# Patient Record
Sex: FEMALE | Race: WHITE | Employment: UNEMPLOYED | URBAN - METROPOLITAN AREA
[De-identification: names, ages, dates, MRNs, and addresses within clinical notes are randomized per-mention and may not be internally consistent; named-entity substitution may affect disease eponyms.]

---

## 2020-05-04 ENCOUNTER — OFFICE VISIT (OUTPATIENT)
Dept: GERIATRIC MEDICINE | Age: 80
End: 2020-05-04
Payer: MEDICARE

## 2020-06-18 VITALS — HEART RATE: 71 BPM | RESPIRATION RATE: 18 BRPM | SYSTOLIC BLOOD PRESSURE: 127 MMHG | DIASTOLIC BLOOD PRESSURE: 70 MMHG

## 2020-06-18 NOTE — PROGRESS NOTES
PATIENTGegeno Vera : 1940 DOS: 2020     [facility]    CHIEF COMPLAINT: Wound on the left foot. SUBJECTIVE: The patient was seen and examined in her room today with respect to the wound on her left foot. She reports she deliberately put on two pairs of sock to mitigate pain in her left foot, and one of her Caregivers reports that she has a concern, but for now, there has been no associated fever, chills, rigors or any discharge from the lesion on her left foot observed. OBJECTIVE: Her vital signs include blood pressure 127/70 mmHg, heart rate is 71 beats per minute and respiratory rate is 18 per minute. The patient is a thin, but well-appearing, elderly,  female, who is alert and not in any obvious distress. Lungs are clear to auscultations, bilaterally, and she has audible heart tones. There is no pedal edema and the skin is warm and dry except for the left foot that is still erythematous, and has actually exhibited significant improvement. She also displays significant discoloration of the toenail with thick distal subungual debris. ASSESSMENT AND PLAN:   1. Cellulitis, left foot: Significantly improved as we continue to observe it, and we will follow up within the next few days to 1 week.   2. Onychomycosis of toenail: consider topical antifungal while waiting for her to be seen by a Lola Maher MD  Attending Family Physician & Geriatrician        Electronically Signed By: Jennifer Obrien MD on 2020 11:19:29  ______________________________  Jennifer Obrien MD  /[id number]  D: 2020 00:36:41  T: 2020 04:45:11    cc: - Gregg Lorenzo of [facility]

## 2020-08-31 ENCOUNTER — OFFICE VISIT (OUTPATIENT)
Dept: GERIATRIC MEDICINE | Age: 80
End: 2020-08-31
Payer: MEDICARE

## 2020-10-24 VITALS — HEART RATE: 71 BPM | RESPIRATION RATE: 18 BRPM | DIASTOLIC BLOOD PRESSURE: 63 MMHG | SYSTOLIC BLOOD PRESSURE: 123 MMHG

## 2020-10-25 NOTE — PROGRESS NOTES
PATIENTMagdlizzette Lozoya : 1940 DOS: 2020     [facility]      CHIEF COMPLAINT:  Skin rash on the face. SUBJECTIVE:  I was asked to see this patient, said to have suddenly developed what looked like a skin rash on the face and by evening, it seems to have improved, but the following day it came back again. Her Caregivers have not observed any associated fever or significant changes in the patient who continues to go about her usual routine on the unit. She ambulates without the use of any assistive device. MEDICATIONS:  She is currently acidophilus caplet 1 twice daily, Aricept 5 mg once daily, Seroquel 12.5 mg once daily, ketoconazole 2% cream to be applied to the left foot twice daily, nystatin powder to be applied around the breasts and the groin until the skin rash resolves, acetaminophen 325 mg and she is to take two tablets q.4 hours p.r.n. as needed for fever or pain, Mintox suspension 30 mL q.4 hours p.r.n., and milk of magnesia 30 mL once daily as needed. OBJECTIVE:  Her vital signs include /63, HR 71, and RR 18. The patient is a well nourished and well developed, elderly,  female, who is alert and not pale or febrile to the touch. Head is normocephalic and atraumatic with pupils equal, round, and reactive to light. Lungs are clear to auscultations, bilaterally, and she exhibits audible heart tones. Skin is warm and dry except for an irregular area of deep erythema on the face. ASSESSMENT AND PLAN:  Facial flushing:   For now, we will continue to monitor and observe the patient while reassuring her relatives but if it continues, we will take care of it.        Kedar Lee MD  Attending Geriatrician        Electronically Signed By: Manjit Real MD on 10/22/2020 11:32:30  ______________________________  Manjit Real MD  /[id number]  D: 10/17/2020 23:08:20  T: 10/18/2020 05:57:46    cc: - [facility]

## 2021-01-13 LAB
ALBUMIN SERPL-MCNC: 4.3 G/DL
ALP BLD-CCNC: 74 U/L
ALT SERPL-CCNC: 19 U/L
ANION GAP SERPL CALCULATED.3IONS-SCNC: 15 MMOL/L
AST SERPL-CCNC: NORMAL U/L
BASOPHILS ABSOLUTE: 0.02 /ΜL
BASOPHILS RELATIVE PERCENT: 0.4 %
BILIRUB SERPL-MCNC: 1 MG/DL (ref 0.1–1.4)
BUN BLDV-MCNC: NORMAL MG/DL
CALCIUM SERPL-MCNC: 9.8 MG/DL
CHLORIDE BLD-SCNC: 103 MMOL/L
CO2: 22 MMOL/L
CREAT SERPL-MCNC: 0.8 MG/DL
EOSINOPHILS ABSOLUTE: 0.02 /ΜL
EOSINOPHILS RELATIVE PERCENT: 0 %
GFR CALCULATED: NORMAL
GLUCOSE BLD-MCNC: NORMAL MG/DL
HCT VFR BLD CALC: 42.6 % (ref 36–46)
HEMOGLOBIN: 13.6 G/DL (ref 12–16)
LYMPHOCYTES ABSOLUTE: 1.57 /ΜL
LYMPHOCYTES RELATIVE PERCENT: 34.4 %
MCH RBC QN AUTO: 273.6 PG
MCHC RBC AUTO-ENTMCNC: 31 G/DL
MCV RBC AUTO: NORMAL FL
MONOCYTES ABSOLUTE: NORMAL
MONOCYTES RELATIVE PERCENT: 14.3 %
NEUTROPHILS ABSOLUTE: 2.3 /ΜL
NEUTROPHILS RELATIVE PERCENT: 50.8 %
PLATELET # BLD: 221 K/ΜL
PMV BLD AUTO: 11.9 FL
POTASSIUM SERPL-SCNC: 4.3 MMOL/L
RBC # BLD: 4.02 10^6/ΜL
SODIUM BLD-SCNC: 140 MMOL/L
TOTAL PROTEIN: 7.2
TROPONIN: <0.1
WBC # BLD: 4.56 10^3/ML

## 2021-02-19 ENCOUNTER — OFFICE VISIT (OUTPATIENT)
Dept: GERIATRIC MEDICINE | Age: 81
End: 2021-02-19
Payer: MEDICARE

## 2021-02-19 VITALS — RESPIRATION RATE: 16 BRPM | SYSTOLIC BLOOD PRESSURE: 147 MMHG | DIASTOLIC BLOOD PRESSURE: 100 MMHG | HEART RATE: 86 BPM

## 2021-02-19 DIAGNOSIS — K13.79 ORAL PAIN: ICD-10-CM

## 2021-02-19 DIAGNOSIS — F03.91 DEMENTIA WITH BEHAVIORAL DISTURBANCE, UNSPECIFIED DEMENTIA TYPE: Primary | ICD-10-CM

## 2021-02-19 DIAGNOSIS — R03.0 BLOOD PRESSURE ELEVATED WITHOUT HISTORY OF HTN: ICD-10-CM

## 2021-02-19 DIAGNOSIS — Z86.16 HISTORY OF 2019 NOVEL CORONAVIRUS DISEASE (COVID-19): ICD-10-CM

## 2021-02-19 RX ORDER — QUETIAPINE FUMARATE 25 MG/1
TABLET, FILM COATED ORAL
COMMUNITY
Start: 2021-02-12 | End: 2021-02-19 | Stop reason: SDUPTHER

## 2021-02-19 RX ORDER — FAMOTIDINE 20 MG/1
TABLET, FILM COATED ORAL
Status: ON HOLD | COMMUNITY
Start: 2021-01-04 | End: 2022-01-21

## 2021-02-19 RX ORDER — QUETIAPINE FUMARATE 25 MG/1
12.5 TABLET, FILM COATED ORAL DAILY
Qty: 30 TABLET | Refills: 0
Start: 2021-02-19

## 2021-02-19 RX ORDER — DONEPEZIL HYDROCHLORIDE 5 MG/1
5 TABLET, FILM COATED ORAL DAILY
COMMUNITY
Start: 2021-02-12

## 2021-02-19 ASSESSMENT — ENCOUNTER SYMPTOMS
TROUBLE SWALLOWING: 0
SINUS PAIN: 0
SORE THROAT: 0

## 2021-02-19 NOTE — PROGRESS NOTES
Romina Lundberg is a 80 y.o. female with cerebrovascular disease with dementia, recent history of coronavirus disease, whom I am seeing at [facility] assisted living [facility] memory unit for annual medical evaluation. The patient was seen with his/her consent in his/her room at the facility. I also spoke with the nurse and reviewed the available records. Chief Complaint   Patient presents with    Dementia    H&P    Blood Pressure Check    Oral Pain       Interim history: Since last seen by the PCP in August 2020, the patient has done well, no emergency room visits. Recovered from recent coronavirus 19 infection with no need for hospitalization, was treated with outpatient treatment protocol. No significant concerns from the nursing staff.         Laboratory and imaging studies reports reviewed included (but were not limited to) the following:    Orders Only on 01/14/2021   Component Date Value Ref Range Status    Troponin 01/13/2021 <0.10   Final    WBC 01/13/2021 4.56  10^3/mL Final    Hemoglobin 01/13/2021 13.6  12.0 - 16.0 g/dL Final    Hematocrit 01/13/2021 42.6  36 - 46 % Final    Platelets 95/74/2471 221  K/µL Final    Neutrophils % 01/13/2021 50.8  % Final    Lymphocytes % 01/13/2021 34.4  % Final    Monocytes % 01/13/2021 14.3  % Final    Eosinophils % 01/13/2021 0.0  % Final    Basophils % 01/13/2021 0.4  % Final    Neutrophils Absolute 01/13/2021 2.30  /µL Final    Lymphocytes Absolute 01/13/2021 1.57  /µL Final    Eosinophils Absolute 01/13/2021 0.02  /µL Final    <0.03    Basophils Absolute 01/13/2021 0.02  /µL Final    <0.03    RBC 01/13/2021 4.02  10^6/µL Final    MCH 01/13/2021 273.6  pg Final    MCHC 01/13/2021 31.0  g/dL Final    MPV 01/13/2021 11.9  fL Final   Orders Only on 01/14/2021   Component Date Value Ref Range Status    Sodium 01/13/2021 140  mmol/L Final    Chloride 01/13/2021 103  mmol/L Final    Potassium 01/13/2021 4.3  mmol/L Final    CREATININE 01/13/2021 0.80   Final    ALT 01/13/2021 19  U/L Final    Calcium 01/13/2021 9.8  mg/dL Final    Total Protein 01/13/2021 7.2   Final    CO2 01/13/2021 22  mmol/L Final    Albumin 01/13/2021 4.3   Final    Alkaline Phosphatase 01/13/2021 74  U/L Final    Total Bilirubin 01/13/2021 1.0  0.1 - 1.4 mg/dL Final    Anion Gap 01/13/2021 15  mmol/L Final       HPI:      Dementia  Primary caregiver is patient and nursing attendant. The nursing staff identifies problems with changes in short and long term memory, getting disoriented outside of familiar environment and wandering. Nursing staff are concerned about  wandering and inability to maintain adequate nutrition. Medication administration: caregiver monitors the use of medications      Functional Assessment:   Activities of Daily Living (ADLs):    She is independent in the following: ambulation and feeding  Requires assistance with the following: bathing and hygiene, grooming and dressing  Instrumental Activities of Daily Living (IADLs):    Requires assistance with the following: all, per the medical evaluation form details. More detail above in the chiefcomplaint(s), interim history and below in the review of systems. Oral Pain   This is a new problem. The pain is mild. Pertinent negatives include no difficulty swallowing, facial pain or oral bleeding. She has tried nothing for the symptoms. Past Medical History:   Diagnosis Date    Asymptomatic varicose veins of both lower extremities     Dementia with [facility] disturbance [facility] [address]     Full dentures     History of hip fracture 2012    Small vessel disease, cerebrovascular        Past Surgical History:   Procedure Laterality Date    HIP FRACTURE SURGERY  2012       Social History     Socioeconomic History    Marital status:       Spouse name: Not on file    Number of children: Not on file    Years of education: Not on file    Highest education level: Not on file   Occupational History    02/19/21 1809 02/19/21 1811   BP: (!) 171/100 (!) 147/100   Site: Right Wrist Left Wrist   Position: Sitting Sitting   Cuff Size: Medium Adult    Pulse: 88 86   Resp: 16        Physical Exam  Constitutional:       General: She is not in acute distress. Appearance: She is normal weight. She is not ill-appearing. Comments: Thin built, age-appropriate, confused but very pleasant, slightly anxious   HENT:      Head: Atraumatic. Nose: No rhinorrhea. Mouth/Throat:      Mouth: Mucous membranes are moist.      Dentition: Has dentures. No gingival swelling or gum lesions. Tongue: Tongue does not deviate from midline. Comments: The patient was asked to remove her upper dentures. This revealed the 3 x 3 mm round nodule on the roof of the mouth close to the gingival line, no ulcerations, pink in color  Eyes:      General: No scleral icterus. Extraocular Movements: Extraocular movements intact. Conjunctiva/sclera: Conjunctivae normal.   Neck:      Musculoskeletal: No neck rigidity. Cardiovascular:      Rate and Rhythm: Regular rhythm. Tachycardia present. Pulses: Normal pulses. Heart sounds: No gallop. Pulmonary:      Effort: Pulmonary effort is normal.      Breath sounds: Normal breath sounds. No wheezing or rales. Abdominal:      General: Abdomen is flat. There is no distension. Palpations: Abdomen is soft. There is no mass. Tenderness: There is no abdominal tenderness. There is no guarding. Musculoskeletal:         General: No tenderness or deformity. Lymphadenopathy:      Cervical: No cervical adenopathy. Skin:     General: Skin is warm and dry. Coloration: Skin is not jaundiced or pale. Findings: No rash. Neurological:      General: No focal deficit present. Mental Status: She is alert. Mental status is at baseline. She is disoriented. Motor: No weakness.       Coordination: Coordination normal.      Gait: Gait normal. Psychiatric:         Attention and Perception: She is attentive. Mood and Affect: Mood is not anxious or depressed. Affect is not labile. Speech: Speech normal.         Behavior: Behavior is not slowed or withdrawn. Behavior is cooperative. Thought Content: Thought content is not paranoid. Cognition and Memory: Memory is impaired. Assessment:    [facility] was seen today for dementia, h&p, blood pressure check and oral pain. Diagnoses and all orders for this visit:    Dementia with behavioral disturbance, unspecified dementia type ([address]               Stable on the current medication including low-dose donepezil. Continue supportive care, activities in the memory unit at [facility]. Annual medical evaluation form completed. Blood pressure elevated without history of HTN                In the setting of slight anxiety due to oral discomfort caused by upper dentures. Start advised to check blood pressure daily every shift x3 days, report readings to the office. Most likely patient is developing hypertensive disease and will need medication such as ACE inhibitor. Oral pain              Start daily mouthwash and use Orajel as needed, reevaluate      History of 2019 novel coronavirus disease (COVID-19)               Recovered from acute phase of the disease, complete outpatient treatment for a duration of 6 weeks. Other orders  -     QUEtiapine (SEROQUEL) 25 MG tablet; Take 0.5 tablets by mouth daily        Plan:    See all orders and comments in the assessment section. The current medical regimen is effective;  continue present plan and medications. Reviewed with the patient/nurse today's diagnosis and associated problems, treatment plans, prognosis, questions answered. Orders for annual laboratories placed in the chart.        Close follow up needed in one month with CNP or with MD.       I have reviewed the patient's medical and surgical, family and social history, health maintenance schedule, and updated the computerized patient record. Please note this report has been partially produced by using speech recognition hardware. It may contain errors related to the system, including grammar, punctuation and spelling as well as words and phrases that may seem inaccurate. For anyquestions or concerns, please feel free to contact me for clarification.         Electronically signed by Edward Mcmahon MD

## 2021-07-07 ENCOUNTER — OFFICE VISIT (OUTPATIENT)
Dept: GERIATRIC MEDICINE | Age: 81
End: 2021-07-07
Payer: MEDICARE

## 2021-07-07 DIAGNOSIS — K59.00 CONSTIPATION, UNSPECIFIED CONSTIPATION TYPE: ICD-10-CM

## 2021-07-07 DIAGNOSIS — F03.90 DEMENTIA WITHOUT BEHAVIORAL DISTURBANCE, UNSPECIFIED DEMENTIA TYPE: Primary | ICD-10-CM

## 2021-07-07 DIAGNOSIS — M15.9 OSTEOARTHRITIS OF MULTIPLE JOINTS, UNSPECIFIED OSTEOARTHRITIS TYPE: ICD-10-CM

## 2021-07-23 NOTE — PROGRESS NOTES
[facility]    Seen for a followup visit for her dementia with agitation, prior CVA, hypertension, degenerative joint disease. Medications reviewed. SUBJECTIVE:  This patient was evaluated in her room. She is at her baseline. Has been on atypical antipsychotic. No evidence of acute psychosis. No recent lightheadedness, or change in her bowel or bladder habits. No recent falls. Nursing staff report patient is clinically stable without evidence of acute pain crisis. No recent chest pain, palpitations. Is redirectable at home. Is more subdued by staff. No evidence of upper respiratory infection. REVIEW OF SYSTEMS:  Cannot provide coherent narration secondary to dementia. OBJECTIVE:  She appeared chronically ill. Pupils are small, but they are reactive. Oral mucosa is moist.  Chest showed no crackles, no wheezing. Cardiovascular exam showed a regular rate. ABDOMEN:  Soft, nontender. Extremities showed trace dorsal pedal pulse. ASSESSMENT AND PLAN:  1. Dementia. Patient is on quetiapine, to wean as able. No evidence of any new psychosis. Is on memantine. 2.   Degenerative joint disease. Continue antiinflammatories. 3.   Chronic constipation with impaction. Monitor clinically.         Electronically Signed By: Joyce Sorensen M.D. on 07/12/2021 01:15:57  ______________________________  Joyce Sorensen M.D.  AD/[id number]  D: 07/09/2021 17:13:22  T: 07/09/2021 18:05:51    [facility]

## 2021-07-28 ENCOUNTER — OFFICE VISIT (OUTPATIENT)
Dept: GERIATRIC MEDICINE | Age: 81
End: 2021-07-28
Payer: MEDICARE

## 2021-07-28 DIAGNOSIS — K59.00 CONSTIPATION, UNSPECIFIED CONSTIPATION TYPE: ICD-10-CM

## 2021-07-28 DIAGNOSIS — F03.90 DEMENTIA WITHOUT BEHAVIORAL DISTURBANCE, UNSPECIFIED DEMENTIA TYPE: Primary | ICD-10-CM

## 2021-07-28 DIAGNOSIS — I10 ESSENTIAL HYPERTENSION: ICD-10-CM

## 2021-08-24 ENCOUNTER — HOSPITAL ENCOUNTER (EMERGENCY)
Age: 81
Discharge: HOME | End: 2021-08-24
Payer: MEDICARE

## 2021-08-24 VITALS
RESPIRATION RATE: 17 BRPM | HEART RATE: 88 BPM | OXYGEN SATURATION: 100 % | BODY MASS INDEX: 23.32 KG/M2 | WEIGHT: 140 LBS | SYSTOLIC BLOOD PRESSURE: 149 MMHG | DIASTOLIC BLOOD PRESSURE: 66 MMHG | HEIGHT: 65 IN | TEMPERATURE: 98.2 F

## 2021-08-24 DIAGNOSIS — F03.91 DEMENTIA WITH BEHAVIORAL DISTURBANCE, UNSPECIFIED DEMENTIA TYPE: Primary | ICD-10-CM

## 2021-08-24 LAB
ALBUMIN SERPL-MCNC: 4.4 G/DL (ref 3.5–4.6)
ALP BLD-CCNC: 69 U/L (ref 40–130)
ALT SERPL-CCNC: 15 U/L (ref 0–33)
ANION GAP SERPL CALCULATED.3IONS-SCNC: 10 MEQ/L (ref 9–15)
ANISOCYTOSIS: ABNORMAL
AST SERPL-CCNC: 25 U/L (ref 0–35)
BACTERIA: ABNORMAL /HPF
BASOPHILS ABSOLUTE: 0 K/UL (ref 0–0.2)
BASOPHILS RELATIVE PERCENT: 0.9 %
BILIRUB SERPL-MCNC: 0.8 MG/DL (ref 0.2–0.7)
BILIRUBIN URINE: NEGATIVE
BLOOD, URINE: NEGATIVE
BUN BLDV-MCNC: 13 MG/DL (ref 8–23)
CALCIUM SERPL-MCNC: 9.5 MG/DL (ref 8.5–9.9)
CHLORIDE BLD-SCNC: 104 MEQ/L (ref 95–107)
CLARITY: CLEAR
CO2: 29 MEQ/L (ref 20–31)
COLOR: YELLOW
CREAT SERPL-MCNC: 0.86 MG/DL (ref 0.5–0.9)
EOSINOPHILS ABSOLUTE: 0.1 K/UL (ref 0–0.7)
EOSINOPHILS RELATIVE PERCENT: 2 %
EPITHELIAL CELLS, UA: ABNORMAL /HPF
GFR AFRICAN AMERICAN: >60
GFR NON-AFRICAN AMERICAN: >60
GLOBULIN: 2.7 G/DL (ref 2.3–3.5)
GLUCOSE BLD-MCNC: 92 MG/DL (ref 70–99)
GLUCOSE URINE: NEGATIVE MG/DL
HCT VFR BLD CALC: 43.3 % (ref 37–47)
HEMOGLOBIN: 14.2 G/DL (ref 12–16)
KETONES, URINE: NEGATIVE MG/DL
LEUKOCYTE ESTERASE, URINE: ABNORMAL
LYMPHOCYTES ABSOLUTE: 1.5 K/UL (ref 1–4.8)
LYMPHOCYTES RELATIVE PERCENT: 26 %
MCH RBC QN AUTO: 28.3 PG (ref 27–31.3)
MCHC RBC AUTO-ENTMCNC: 32.8 % (ref 33–37)
MCV RBC AUTO: 86.2 FL (ref 82–100)
MONOCYTES ABSOLUTE: 0.8 K/UL (ref 0.2–0.8)
MONOCYTES RELATIVE PERCENT: 14 %
NEUTROPHILS ABSOLUTE: 3.4 K/UL (ref 1.4–6.5)
NEUTROPHILS RELATIVE PERCENT: 58 %
NITRITE, URINE: NEGATIVE
PDW BLD-RTO: 16.3 % (ref 11.5–14.5)
PH UA: 6 (ref 5–9)
PLATELET # BLD: 192 K/UL (ref 130–400)
PLATELET SLIDE REVIEW: ADEQUATE
POTASSIUM SERPL-SCNC: 4 MEQ/L (ref 3.4–4.9)
PROTEIN UA: NEGATIVE MG/DL
RBC # BLD: 5.02 M/UL (ref 4.2–5.4)
RBC UA: ABNORMAL /HPF (ref 0–2)
SODIUM BLD-SCNC: 143 MEQ/L (ref 135–144)
SPECIFIC GRAVITY UA: 1.01 (ref 1–1.03)
TOTAL PROTEIN: 7.1 G/DL (ref 6.3–8)
URINE REFLEX TO CULTURE: ABNORMAL
UROBILINOGEN, URINE: 0.2 E.U./DL
WBC # BLD: 5.8 K/UL (ref 4.8–10.8)
WBC UA: ABNORMAL /HPF (ref 0–5)

## 2021-08-24 PROCEDURE — 36415 COLL VENOUS BLD VENIPUNCTURE: CPT

## 2021-08-24 PROCEDURE — 81001 URINALYSIS AUTO W/SCOPE: CPT

## 2021-08-24 PROCEDURE — 85025 COMPLETE CBC W/AUTO DIFF WBC: CPT

## 2021-08-24 PROCEDURE — 99282 EMERGENCY DEPT VISIT SF MDM: CPT

## 2021-08-24 PROCEDURE — 80053 COMPREHEN METABOLIC PANEL: CPT

## 2021-08-24 RX ORDER — MELATONIN
1000 DAILY
COMMUNITY

## 2021-08-24 RX ORDER — KETOCONAZOLE 20 MG/G
CREAM TOPICAL 2 TIMES DAILY
COMMUNITY

## 2021-08-24 RX ORDER — SELENIUM 50 MCG
1 TABLET ORAL 2 TIMES DAILY
COMMUNITY

## 2021-08-24 NOTE — ED NOTES
This nurse called [facility] and give report to nurse. They are aware pt being sent back with no ABN labs.  Awaiting transport with [facility] back to [address], RN  08/24/21 5633

## 2021-08-24 NOTE — ED PROVIDER NOTES
[address] [facility] ED  EMERGENCY DEPARTMENT ENCOUNTER      Pt Name: Kendy Clemente  MRN: [id number]  [place] 1940  Date of evaluation: 8/24/2021  Provider: Santos Perez PA-C      HISTORY OF PRESENT ILLNESS    Kendy Clemente is a 80 y.o. female who presents to the Emergency Department with medical evaluation. Patient was at [facility] she does have history of dementia and she got into an argument with the patient she changed no injuries she not hit her head she has no complaints and would like to go back to [facility]. REVIEW OF SYSTEMS       Review of Systems   Unable to perform ROS: Dementia         PAST MEDICAL HISTORY     Past Medical History:   Diagnosis Date    Asymptomatic varicose veins of both lower extremities     Dementia with behavioral disturbance (HCC)     Elevated blood pressure reading     Full dentures     History of hip fracture 2012    Small vessel disease, cerebrovascular          SURGICAL HISTORY       Past Surgical History:   Procedure Laterality Date    HIP FRACTURE SURGERY  2012         CURRENT MEDICATIONS       Previous Medications    DONEPEZIL (ARICEPT) 5 MG TABLET    Take 5 mg by mouth daily     FAMOTIDINE (PEPCID) 20 MG TABLET        KETOCONAZOLE (NIZORAL) 2 % CREAM    Apply topically daily Apply topically daily. LACTOBACILLUS (ACIDOPHILUS) CAPS CAPSULE    Take 1 capsule by mouth 2 times daily    QUETIAPINE (SEROQUEL) 25 MG TABLET    Take 0.5 tablets by mouth daily    VITAMIN D3 (CHOLECALCIFEROL) 25 MCG (1000 UT) TABS TABLET    Take 1,000 Units by mouth daily       ALLERGIES     Bactrim [sulfamethoxazole-trimethoprim], Bee venom, Ceftin [cefuroxime], and Wasp venom    FAMILY HISTORY       Family History   Family history unknown: Yes          SOCIAL HISTORY       Social History     Socioeconomic History    Marital status:       Spouse name: Not on file    Number of children: Not on file    Years of education: Not on file    Highest education level: Not on file   Occupational History    Not on file   Tobacco Use    Smoking status: Unknown If Ever Smoked    Smokeless tobacco: Never Used   Substance and Sexual Activity    Alcohol use: Not on file    Drug use: Not on file    Sexual activity: Not on file   Other Topics Concern    Not on file   Social History Narrative    No children    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    Resides at [place] [address] Strain:     Difficulty of Paying Living Expenses:    Food Insecurity:     Worried About [address] in the Last Year:    [address] in the Last Year:    Transportation Needs:     Lack of Transportation (Medical):  Lack of Transportation (Non-Medical):    Physical Activity:     Days of Exercise per Week:     Minutes of Exercise per Session:    Stress:     Feeling of Stress :    Social Connections:     Frequency of Communication with Friends and Family:     Frequency of Social Gatherings with Friends and Family:     Attends Zoroastrianism Services:     Active Member of Clubs or Organizations:     Attends Club or Organization Meetings:     Marital Status:    Intimate Partner Violence:     Fear of Current or Ex-Partner:     Emotionally Abused:     Physically Abused:     Sexually Abused:        SCREENINGS             PHYSICAL EXAM    (up to 7 for level 4, 8 or more for level 5)     ED Triage Vitals [08/24/21 0846]   BP Temp Temp Source Pulse Resp SpO2 Height Weight   (!) 149/66 98.2 °F (36.8 °C) Temporal 88 17 100 % 5' 5\" (1.651 m) 140 lb (63.5 kg)       Physical Exam  Vitals and nursing note reviewed. Constitutional:       General: She is not in acute distress. Appearance: Normal appearance. She is well-developed. She is not diaphoretic. HENT:      Head: Normocephalic and atraumatic.    Eyes:      General: Lids are normal.      Conjunctiva/sclera: Conjunctivae normal.   Cardiovascular:      Rate and Rhythm: Normal rate and regular rhythm. Pulses: Normal pulses. Heart sounds: Normal heart sounds. Pulmonary:      Effort: Pulmonary effort is normal.      Breath sounds: Normal breath sounds. Abdominal:      General: Bowel sounds are normal.      Palpations: Abdomen is soft. Tenderness: There is no abdominal tenderness. Musculoskeletal:      Cervical back: Normal range of motion and neck supple. Lymphadenopathy:      Cervical: No cervical adenopathy. Skin:     General: Skin is warm and dry. Capillary Refill: Capillary refill takes less than 2 seconds. Findings: No rash. Neurological:      Mental Status: She is alert and oriented to person, place, and time. Psychiatric:         Thought Content: Thought content normal.         Judgment: Judgment normal.           All other labs were within normal range or not returned as of this dictation. EMERGENCY DEPARTMENT COURSE and DIFFERENTIALDIAGNOSIS/MDM:   Vitals:    Vitals:    08/24/21 0846   BP: (!) 149/66   Pulse: 88   Resp: 17   Temp: 98.2 °F (36.8 °C)   TempSrc: Temporal   SpO2: 100%   Weight: 140 lb (63.5 kg)   Height: 5' 5\" (1.651 m)            Patient is nontoxic no acute distress she is afebrile hemodynamically stable she denies any injuries and reports they sent her here. CBC CMP and urine were performed on the patient as screening and show no acute abnormalities. Patient has no complaints she has no visible signs of injury or trauma so she will be sent back to the nursing home. PROCEDURES:  Unless otherwise noted below, none     Procedures      FINAL IMPRESSION      1.  Dementia with behavioral disturbance, unspecified dementia type [facility])          DISPOSITION/PLAN   DISPOSITION Decision To Discharge 08/24/2021 10:38:06 AM          Shauna Ratliff PA-C (electronically signed)  Attending Emergency Physician       Shauna Ratliff PA-C  08/24/21 3788

## 2021-08-28 NOTE — PROGRESS NOTES
SUBJECTIVE:  This 22-year-old woman is seen in her room for follow-up visit for her dementia confusion. Patient has not any recent decline no recent orthostasis. Has not had new change in her bowel bladder habits no recent headaches fevers chills. Patient has not any evidence acute psychosis is redirectable as able. Pain-free. ROS: Limited by dementia  The rest of the 14 point ROS negative    PHYSICAL EXAM: VSS per facility record  Normocephalic atraumatic pupils are equal reactive oral mucosa moist chest showed no crackles no wheezing cardiovascular showed a regular rate abdomen soft nontender extremity trace terrestrial pulse    ASSESSMENT & PLAN:   Diagnosis Orders   1. Dementia without behavioral disturbance, unspecified dementia type ([facility] [address]     2. Essential hypertension     3. Constipation, unspecified constipation type       May consider maximizing donepezil is on quetiapine no acute extraparametal syndrome monitoring systolic pressure closely is on anti-inflammatory. Past Medical History:   Diagnosis Date    Asymptomatic varicose veins of both lower extremities     Dementia with behavioral disturbance (HCC)     Elevated blood pressure reading     Full dentures     History of hip fracture 2012    Small vessel disease, cerebrovascular          Past Surgical History:   Procedure Laterality Date    HIP FRACTURE SURGERY  2012         Current Outpatient Medications on File Prior to Visit   Medication Sig Dispense Refill    donepezil (ARICEPT) 5 MG tablet Take 5 mg by mouth daily       famotidine (PEPCID) 20 MG tablet       QUEtiapine (SEROQUEL) 25 MG tablet Take 0.5 tablets by mouth daily 30 tablet 0     No current facility-administered medications on file prior to visit. 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    Resides at [place] [address] Strain:     Difficulty of Paying Living Expenses:    Food Insecurity:     Worried About [address] in the Last Year:    [address] in the Last Year:    Transportation Needs:     Lack of Transportation (Medical):      Lack of Transportation (Non-Medical):    Physical Activity:     Days of Exercise per Week:     Minutes of Exercise per Session:    Stress:     Feeling of Stress :    Social Connections:     Frequency of Communication with Friends and Family:     Frequency of Social Gatherings with Friends and Family:     Attends Quaker Services:     Active Member of Clubs or Organizations:     Attends Club or Organization Meetings:     Marital Status:    Intimate Partner Violence:     Fear of Current or Ex-Partner:     Emotionally Abused:     Physically Abused:     Sexually Abused:          No results found for: LABA1C  No results found for: EAG    Lab Results   Component Value Date     08/24/2021    K 4.0 08/24/2021     08/24/2021    CO2 29 08/24/2021    BUN 13 08/24/2021    CREATININE 0.86 08/24/2021    GLUCOSE 92 08/24/2021    CALCIUM 9.5 08/24/2021        No results found for: CHOL  No results found for: TRIG  No results found for: HDL  No results found for: LDLCHOLESTEROL, LDLCALC  No results found for: LABVLDL, VLDL  No results found for: CHOLHDLRATIO    No results found for: TSHFT4, TSH    Lab Results   Component Value Date    WBC 5.8 08/24/2021    HGB 14.2 08/24/2021    HCT 43.3 08/24/2021    MCV 86.2 08/24/2021     08/24/2021       Please note orders entered on site at facility after discussion with appropriate facility nursing/therapy/ / nutritional staff. Current longstanding medical problems and acute medical issues addressed with staff. Available data and data elements in on site paper chart reviewed and analyzed. Current external consultant notes reviewed in on site chart. Ordered laboratory testing and imaging will be reviewed when available.

## 2022-01-06 ENCOUNTER — OFFICE VISIT (OUTPATIENT)
Dept: GERIATRIC MEDICINE | Age: 82
End: 2022-01-06
Payer: MEDICARE

## 2022-01-06 DIAGNOSIS — F03.91 DEMENTIA WITH BEHAVIORAL DISTURBANCE, UNSPECIFIED DEMENTIA TYPE: Primary | ICD-10-CM

## 2022-01-12 ENCOUNTER — OFFICE VISIT (OUTPATIENT)
Dept: GERIATRIC MEDICINE | Age: 82
End: 2022-01-12
Payer: MEDICARE

## 2022-01-12 DIAGNOSIS — F02.80 LATE ONSET ALZHEIMER'S DEMENTIA WITHOUT BEHAVIORAL DISTURBANCE (HCC): ICD-10-CM

## 2022-01-12 DIAGNOSIS — Z86.16 HISTORY OF COVID-19: ICD-10-CM

## 2022-01-12 DIAGNOSIS — G30.1 LATE ONSET ALZHEIMER'S DEMENTIA WITHOUT BEHAVIORAL DISTURBANCE (HCC): ICD-10-CM

## 2022-01-14 ENCOUNTER — OFFICE VISIT (OUTPATIENT)
Dept: GERIATRIC MEDICINE | Age: 82
End: 2022-01-14
Payer: MEDICARE

## 2022-01-14 DIAGNOSIS — Z86.16 HISTORY OF COVID-19: ICD-10-CM

## 2022-01-14 DIAGNOSIS — F02.80 LATE ONSET ALZHEIMER'S DEMENTIA WITHOUT BEHAVIORAL DISTURBANCE (HCC): Primary | ICD-10-CM

## 2022-01-14 DIAGNOSIS — G30.1 LATE ONSET ALZHEIMER'S DEMENTIA WITHOUT BEHAVIORAL DISTURBANCE (HCC): Primary | ICD-10-CM

## 2022-01-20 ENCOUNTER — OFFICE VISIT (OUTPATIENT)
Dept: GERIATRIC MEDICINE | Age: 82
End: 2022-01-20
Payer: MEDICARE

## 2022-01-20 DIAGNOSIS — R60.0 EDEMA OF BOTH FEET: ICD-10-CM

## 2022-01-20 DIAGNOSIS — R60.0 BILATERAL EDEMA OF LOWER EXTREMITY: Primary | ICD-10-CM

## 2022-01-21 ENCOUNTER — APPOINTMENT (OUTPATIENT)
Dept: CT IMAGING | Age: 82
End: 2022-01-21
Payer: MEDICARE

## 2022-01-21 ENCOUNTER — APPOINTMENT (OUTPATIENT)
Dept: GENERAL RADIOLOGY | Age: 82
End: 2022-01-21
Payer: MEDICARE

## 2022-01-21 ENCOUNTER — HOSPITAL ENCOUNTER (OUTPATIENT)
Age: 82
Setting detail: OBSERVATION
Discharge: OTHER INSTITUTION | End: 2022-01-24
Attending: SURGERY | Admitting: SURGERY
Payer: MEDICARE

## 2022-01-21 DIAGNOSIS — S32.009A LUMBAR TRANSVERSE PROCESS FRACTURE, CLOSED, INITIAL ENCOUNTER (HCC): Primary | ICD-10-CM

## 2022-01-21 DIAGNOSIS — Z86.59 HISTORY OF DEMENTIA: ICD-10-CM

## 2022-01-21 DIAGNOSIS — R26.2 UNABLE TO AMBULATE: ICD-10-CM

## 2022-01-21 DIAGNOSIS — S32.10XA CLOSED FRACTURE OF SACRUM, UNSPECIFIED PORTION OF SACRUM, INITIAL ENCOUNTER (HCC): ICD-10-CM

## 2022-01-21 PROBLEM — M80.08XA AGE-REL OSTEOPOR W CURRENT PATH FRACTURE, VERTEBRA(E), INIT (HCC): Status: ACTIVE | Noted: 2022-01-21

## 2022-01-21 LAB
ALBUMIN SERPL-MCNC: 3.8 G/DL (ref 3.5–4.6)
ALP BLD-CCNC: 171 U/L (ref 40–130)
ALT SERPL-CCNC: 21 U/L (ref 0–33)
ANION GAP SERPL CALCULATED.3IONS-SCNC: 11 MEQ/L (ref 9–15)
APTT: 40.6 SEC (ref 24.4–36.8)
AST SERPL-CCNC: 29 U/L (ref 0–35)
BACTERIA: ABNORMAL /HPF
BASOPHILS ABSOLUTE: 0.1 K/UL (ref 0–0.2)
BASOPHILS RELATIVE PERCENT: 0.6 %
BILIRUB SERPL-MCNC: 0.7 MG/DL (ref 0.2–0.7)
BUN BLDV-MCNC: 14 MG/DL (ref 8–23)
CALCIUM SERPL-MCNC: 9.3 MG/DL (ref 8.5–9.9)
CHLORIDE BLD-SCNC: 101 MEQ/L (ref 95–107)
CO2: 26 MEQ/L (ref 20–31)
CREAT SERPL-MCNC: 0.93 MG/DL (ref 0.5–0.9)
EKG ATRIAL RATE: 92 BPM
EKG P-R INTERVAL: 142 MS
EKG Q-T INTERVAL: 368 MS
EKG QRS DURATION: 80 MS
EKG QTC CALCULATION (BAZETT): 455 MS
EKG R AXIS: 146 DEGREES
EKG T AXIS: 144 DEGREES
EKG VENTRICULAR RATE: 92 BPM
EOSINOPHILS ABSOLUTE: 0 K/UL (ref 0–0.7)
EOSINOPHILS RELATIVE PERCENT: 0.1 %
EPITHELIAL CELLS, UA: ABNORMAL /HPF (ref 0–5)
GFR AFRICAN AMERICAN: >60
GFR NON-AFRICAN AMERICAN: 57.7
GLOBULIN: 3.3 G/DL (ref 2.3–3.5)
GLUCOSE BLD-MCNC: 120 MG/DL (ref 70–99)
HCT VFR BLD CALC: 39.5 % (ref 37–47)
HEMOGLOBIN: 13.1 G/DL (ref 12–16)
HYALINE CASTS: ABNORMAL /HPF (ref 0–5)
INR BLD: 1.1
LYMPHOCYTES ABSOLUTE: 1.1 K/UL (ref 1–4.8)
LYMPHOCYTES RELATIVE PERCENT: 12.5 %
MCH RBC QN AUTO: 28.6 PG (ref 27–31.3)
MCHC RBC AUTO-ENTMCNC: 33.3 % (ref 33–37)
MCV RBC AUTO: 85.9 FL (ref 82–100)
MONOCYTES ABSOLUTE: 1.3 K/UL (ref 0.2–0.8)
MONOCYTES RELATIVE PERCENT: 14.9 %
NEUTROPHILS ABSOLUTE: 6.4 K/UL (ref 1.4–6.5)
NEUTROPHILS RELATIVE PERCENT: 71.9 %
PDW BLD-RTO: 14.1 % (ref 11.5–14.5)
PLATELET # BLD: 242 K/UL (ref 130–400)
POTASSIUM SERPL-SCNC: 4.3 MEQ/L (ref 3.4–4.9)
PROTHROMBIN TIME: 14.5 SEC (ref 12.3–14.9)
RBC # BLD: 4.6 M/UL (ref 4.2–5.4)
RBC UA: ABNORMAL /HPF (ref 0–2)
SODIUM BLD-SCNC: 138 MEQ/L (ref 135–144)
TOTAL PROTEIN: 7.1 G/DL (ref 6.3–8)
WBC # BLD: 8.9 K/UL (ref 4.8–10.8)
WBC UA: ABNORMAL /HPF (ref 0–5)

## 2022-01-21 PROCEDURE — 85610 PROTHROMBIN TIME: CPT

## 2022-01-21 PROCEDURE — 99222 1ST HOSP IP/OBS MODERATE 55: CPT | Performed by: PHYSICIAN ASSISTANT

## 2022-01-21 PROCEDURE — 6830039000 HC L3 TRAUMA ALERT

## 2022-01-21 PROCEDURE — 93005 ELECTROCARDIOGRAM TRACING: CPT | Performed by: STUDENT IN AN ORGANIZED HEALTH CARE EDUCATION/TRAINING PROGRAM

## 2022-01-21 PROCEDURE — 2580000003 HC RX 258: Performed by: PHYSICIAN ASSISTANT

## 2022-01-21 PROCEDURE — 72125 CT NECK SPINE W/O DYE: CPT

## 2022-01-21 PROCEDURE — 73700 CT LOWER EXTREMITY W/O DYE: CPT

## 2022-01-21 PROCEDURE — 6360000002 HC RX W HCPCS: Performed by: STUDENT IN AN ORGANIZED HEALTH CARE EDUCATION/TRAINING PROGRAM

## 2022-01-21 PROCEDURE — 96372 THER/PROPH/DIAG INJ SC/IM: CPT

## 2022-01-21 PROCEDURE — 6370000000 HC RX 637 (ALT 250 FOR IP): Performed by: PHYSICIAN ASSISTANT

## 2022-01-21 PROCEDURE — 72128 CT CHEST SPINE W/O DYE: CPT

## 2022-01-21 PROCEDURE — 81001 URINALYSIS AUTO W/SCOPE: CPT

## 2022-01-21 PROCEDURE — 6360000002 HC RX W HCPCS: Performed by: PHYSICIAN ASSISTANT

## 2022-01-21 PROCEDURE — 71260 CT THORAX DX C+: CPT

## 2022-01-21 PROCEDURE — G0378 HOSPITAL OBSERVATION PER HR: HCPCS

## 2022-01-21 PROCEDURE — 73552 X-RAY EXAM OF FEMUR 2/>: CPT

## 2022-01-21 PROCEDURE — 74177 CT ABD & PELVIS W/CONTRAST: CPT

## 2022-01-21 PROCEDURE — 36415 COLL VENOUS BLD VENIPUNCTURE: CPT

## 2022-01-21 PROCEDURE — 93010 ELECTROCARDIOGRAM REPORT: CPT | Performed by: INTERNAL MEDICINE

## 2022-01-21 PROCEDURE — 2580000003 HC RX 258: Performed by: STUDENT IN AN ORGANIZED HEALTH CARE EDUCATION/TRAINING PROGRAM

## 2022-01-21 PROCEDURE — 85025 COMPLETE CBC W/AUTO DIFF WBC: CPT

## 2022-01-21 PROCEDURE — 6360000004 HC RX CONTRAST MEDICATION: Performed by: PHYSICIAN ASSISTANT

## 2022-01-21 PROCEDURE — 99284 EMERGENCY DEPT VISIT MOD MDM: CPT

## 2022-01-21 PROCEDURE — 85730 THROMBOPLASTIN TIME PARTIAL: CPT

## 2022-01-21 PROCEDURE — 70450 CT HEAD/BRAIN W/O DYE: CPT

## 2022-01-21 PROCEDURE — 2580000003 HC RX 258: Performed by: SURGERY

## 2022-01-21 PROCEDURE — 72131 CT LUMBAR SPINE W/O DYE: CPT

## 2022-01-21 PROCEDURE — 99204 OFFICE O/P NEW MOD 45 MIN: CPT | Performed by: NEUROLOGICAL SURGERY

## 2022-01-21 PROCEDURE — 96374 THER/PROPH/DIAG INJ IV PUSH: CPT

## 2022-01-21 PROCEDURE — 96375 TX/PRO/DX INJ NEW DRUG ADDON: CPT

## 2022-01-21 PROCEDURE — 80053 COMPREHEN METABOLIC PANEL: CPT

## 2022-01-21 RX ORDER — SODIUM CHLORIDE 0.9 % (FLUSH) 0.9 %
10 SYRINGE (ML) INJECTION
Status: DISPENSED | OUTPATIENT
Start: 2022-01-21 | End: 2022-01-21

## 2022-01-21 RX ORDER — MORPHINE SULFATE 2 MG/ML
2 INJECTION, SOLUTION INTRAMUSCULAR; INTRAVENOUS
Status: DISCONTINUED | OUTPATIENT
Start: 2022-01-21 | End: 2022-01-21

## 2022-01-21 RX ORDER — SODIUM CHLORIDE 0.9 % (FLUSH) 0.9 %
5-40 SYRINGE (ML) INJECTION EVERY 12 HOURS SCHEDULED
Status: DISCONTINUED | OUTPATIENT
Start: 2022-01-21 | End: 2022-01-24 | Stop reason: HOSPADM

## 2022-01-21 RX ORDER — MORPHINE SULFATE 2 MG/ML
2 INJECTION, SOLUTION INTRAMUSCULAR; INTRAVENOUS ONCE
Status: COMPLETED | OUTPATIENT
Start: 2022-01-21 | End: 2022-01-21

## 2022-01-21 RX ORDER — SODIUM CHLORIDE, SODIUM LACTATE, POTASSIUM CHLORIDE, CALCIUM CHLORIDE 600; 310; 30; 20 MG/100ML; MG/100ML; MG/100ML; MG/100ML
INJECTION, SOLUTION INTRAVENOUS CONTINUOUS
Status: DISCONTINUED | OUTPATIENT
Start: 2022-01-21 | End: 2022-01-22

## 2022-01-21 RX ORDER — VITAMIN B COMPLEX
1000 TABLET ORAL DAILY
Status: DISCONTINUED | OUTPATIENT
Start: 2022-01-21 | End: 2022-01-24 | Stop reason: HOSPADM

## 2022-01-21 RX ORDER — ONDANSETRON 4 MG/1
4 TABLET, ORALLY DISINTEGRATING ORAL EVERY 8 HOURS PRN
Status: DISCONTINUED | OUTPATIENT
Start: 2022-01-21 | End: 2022-01-24 | Stop reason: HOSPADM

## 2022-01-21 RX ORDER — SODIUM CHLORIDE 0.9 % (FLUSH) 0.9 %
5-40 SYRINGE (ML) INJECTION PRN
Status: DISCONTINUED | OUTPATIENT
Start: 2022-01-21 | End: 2022-01-24 | Stop reason: HOSPADM

## 2022-01-21 RX ORDER — SODIUM CHLORIDE 9 MG/ML
25 INJECTION, SOLUTION INTRAVENOUS PRN
Status: DISCONTINUED | OUTPATIENT
Start: 2022-01-21 | End: 2022-01-24 | Stop reason: HOSPADM

## 2022-01-21 RX ORDER — QUETIAPINE FUMARATE 25 MG/1
12.5 TABLET, FILM COATED ORAL DAILY
Status: DISCONTINUED | OUTPATIENT
Start: 2022-01-21 | End: 2022-01-24 | Stop reason: HOSPADM

## 2022-01-21 RX ORDER — ONDANSETRON 2 MG/ML
4 INJECTION INTRAMUSCULAR; INTRAVENOUS ONCE
Status: COMPLETED | OUTPATIENT
Start: 2022-01-21 | End: 2022-01-21

## 2022-01-21 RX ORDER — ONDANSETRON 2 MG/ML
4 INJECTION INTRAMUSCULAR; INTRAVENOUS EVERY 6 HOURS PRN
Status: DISCONTINUED | OUTPATIENT
Start: 2022-01-21 | End: 2022-01-24 | Stop reason: HOSPADM

## 2022-01-21 RX ORDER — ACETAMINOPHEN 325 MG/1
650 TABLET ORAL EVERY 4 HOURS PRN
Status: DISCONTINUED | OUTPATIENT
Start: 2022-01-21 | End: 2022-01-21

## 2022-01-21 RX ORDER — POLYETHYLENE GLYCOL 3350 17 G/17G
17 POWDER, FOR SOLUTION ORAL DAILY PRN
Status: DISCONTINUED | OUTPATIENT
Start: 2022-01-21 | End: 2022-01-24 | Stop reason: HOSPADM

## 2022-01-21 RX ORDER — ACETAMINOPHEN 325 MG/1
650 TABLET ORAL EVERY 6 HOURS
Status: DISCONTINUED | OUTPATIENT
Start: 2022-01-21 | End: 2022-01-24 | Stop reason: HOSPADM

## 2022-01-21 RX ORDER — SENNA AND DOCUSATE SODIUM 50; 8.6 MG/1; MG/1
2 TABLET, FILM COATED ORAL 2 TIMES DAILY
Status: DISCONTINUED | OUTPATIENT
Start: 2022-01-21 | End: 2022-01-24 | Stop reason: HOSPADM

## 2022-01-21 RX ORDER — KETOCONAZOLE 20 MG/G
CREAM TOPICAL 2 TIMES DAILY
Status: DISCONTINUED | OUTPATIENT
Start: 2022-01-21 | End: 2022-01-24 | Stop reason: HOSPADM

## 2022-01-21 RX ORDER — 0.9 % SODIUM CHLORIDE 0.9 %
1000 INTRAVENOUS SOLUTION INTRAVENOUS ONCE
Status: COMPLETED | OUTPATIENT
Start: 2022-01-21 | End: 2022-01-21

## 2022-01-21 RX ORDER — L. ACIDOPHILUS/L.BULGARICUS 1MM CELL
1 TABLET ORAL 2 TIMES DAILY
Status: DISCONTINUED | OUTPATIENT
Start: 2022-01-21 | End: 2022-01-24 | Stop reason: HOSPADM

## 2022-01-21 RX ORDER — DONEPEZIL HYDROCHLORIDE 5 MG/1
5 TABLET, FILM COATED ORAL DAILY
Status: DISCONTINUED | OUTPATIENT
Start: 2022-01-21 | End: 2022-01-24 | Stop reason: HOSPADM

## 2022-01-21 RX ORDER — TRAMADOL HYDROCHLORIDE 50 MG/1
25 TABLET ORAL EVERY 6 HOURS PRN
Status: DISCONTINUED | OUTPATIENT
Start: 2022-01-21 | End: 2022-01-24 | Stop reason: HOSPADM

## 2022-01-21 RX ADMIN — Medication 1000 UNITS: at 18:24

## 2022-01-21 RX ADMIN — DONEPEZIL HYDROCHLORIDE 5 MG: 5 TABLET, FILM COATED ORAL at 18:23

## 2022-01-21 RX ADMIN — MORPHINE SULFATE 2 MG: 2 INJECTION, SOLUTION INTRAMUSCULAR; INTRAVENOUS at 05:25

## 2022-01-21 RX ADMIN — Medication 1 TABLET: at 20:36

## 2022-01-21 RX ADMIN — TRAMADOL HYDROCHLORIDE 25 MG: 50 TABLET, COATED ORAL at 20:37

## 2022-01-21 RX ADMIN — QUETIAPINE FUMARATE 12.5 MG: 25 TABLET ORAL at 20:43

## 2022-01-21 RX ADMIN — ACETAMINOPHEN 650 MG: 325 TABLET ORAL at 18:23

## 2022-01-21 RX ADMIN — ONDANSETRON 4 MG: 2 INJECTION INTRAMUSCULAR; INTRAVENOUS at 05:25

## 2022-01-21 RX ADMIN — SODIUM CHLORIDE 1000 ML: 9 INJECTION, SOLUTION INTRAVENOUS at 05:24

## 2022-01-21 RX ADMIN — Medication 10 ML: at 20:35

## 2022-01-21 RX ADMIN — SODIUM CHLORIDE, POTASSIUM CHLORIDE, SODIUM LACTATE AND CALCIUM CHLORIDE: 600; 310; 30; 20 INJECTION, SOLUTION INTRAVENOUS at 18:30

## 2022-01-21 RX ADMIN — IOPAMIDOL 100 ML: 612 INJECTION, SOLUTION INTRAVENOUS at 12:42

## 2022-01-21 RX ADMIN — ENOXAPARIN SODIUM 30 MG: 100 INJECTION SUBCUTANEOUS at 20:35

## 2022-01-21 ASSESSMENT — PAIN DESCRIPTION - ORIENTATION
ORIENTATION: RIGHT

## 2022-01-21 ASSESSMENT — PAIN SCALES - GENERAL
PAINLEVEL_OUTOF10: 4
PAINLEVEL_OUTOF10: 0
PAINLEVEL_OUTOF10: 9
PAINLEVEL_OUTOF10: 0

## 2022-01-21 ASSESSMENT — PAIN SCALES - WONG BAKER
WONGBAKER_NUMERICALRESPONSE: 0
WONGBAKER_NUMERICALRESPONSE: 6
WONGBAKER_NUMERICALRESPONSE: 6
WONGBAKER_NUMERICALRESPONSE: 0

## 2022-01-21 ASSESSMENT — PAIN DESCRIPTION - LOCATION
LOCATION: HIP;PELVIS
LOCATION: HIP

## 2022-01-21 ASSESSMENT — PAIN DESCRIPTION - PAIN TYPE
TYPE: ACUTE PAIN

## 2022-01-21 ASSESSMENT — PAIN DESCRIPTION - DESCRIPTORS
DESCRIPTORS: ACHING

## 2022-01-21 NOTE — FLOWSHEET NOTE
Pt admitted to floor. from ED. Alert to self only. Denies pain. Assessment completed. VS obtained.  Pt remains on strict bed rest.Electronically signed by Dom Fu RN on 1/21/2022 at 2:26 PM

## 2022-01-21 NOTE — ED NOTES
Patient asleep in room. Equal chest rise and fall. No distress noted. Will continue to monitor.         Margie Davis RN  01/21/22 [id number]

## 2022-01-21 NOTE — CONSULTS
Patient Name: Galina Da Silva Date: 2022  3:01 AM  MR #: [id number]  : 1940    Attending Physician: Violette West MD  Reason for consult: Spine fracture  History of Presenting Illness and Review of Maisha Correia is a 80 y.o. female on hospital day 0 . History  [facility]ident [facility] living facility with an unwitnessed fall complaining of right hip pain and inability to ambulate. Status post right hip replacement. Comorbidities include history of dementia hypertension prior cerebral infarct degenerative joint disease. Unable to obtain history from patient    History:      Past Medical History:   Diagnosis Date    Asymptomatic varicose veins of both lower extremities     Dementia with behavioral disturbance (HCC)     Elevated blood pressure reading     Full dentures     History of hip fracture 2012    Small vessel disease, cerebrovascular      Past Surgical History:   Procedure Laterality Date    HIP FRACTURE SURGERY  2012       Family History  Family History   Family history unknown: Yes         Social History     Socioeconomic History    Marital status:       Spouse name: Not on file    Number of children: Not on file    Years of education: Not on file    Highest education level: Not on file   Occupational History    Not on file   Tobacco Use    Smoking status: Unknown If Ever Smoked    Smokeless tobacco: Never Used   Substance and Sexual Activity    Alcohol use: Not on file    Drug use: Not on file    Sexual activity: Not on file   Other Topics Concern    Not on file   Social History Narrative    No children    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 with behavior problems in     Resides at [place] [address] Strain:     Difficulty of Paying Living Expenses: Not on file   Food Insecurity:     Worried About [address] in the Last Year: Not on file  Ran Out of Food in the Last Year: Not on file   Transportation Needs:     Lack of Transportation (Medical): Not on file    Lack of Transportation (Non-Medical): Not on file   Physical Activity:     Days of Exercise per Week: Not on file    Minutes of Exercise per Session: Not on file   Stress:     Feeling of Stress : Not on file   Social Connections:     Frequency of Communication with Friends and Family: Not on file    Frequency of Social Gatherings with Friends and Family: Not on file    Attends Mormon Services: Not on file    Active Member of [address] [place] [address] [employer] or Organizations: Not on file    Attends Club or Organization Meetings: Not on file    Marital Status: Not on file   Intimate Partner Violence:     Fear of Current or Ex-Partner: Not on file    Emotionally Abused: Not on file    Physically Abused: Not on file    Sexually Abused: Not on file   Housing Stability:     Unable to Pay for Housing in the Last Year: Not on file    Number of [place] in the Last Year: Not on file    Unstable Housing in the Last Year: Not on file            Home Medications:      Not in a hospital admission. Current Hospital Medications:     Scheduled Meds:   sodium chloride flush  5-40 mL IntraVENous 2 times per day     Continuous Infusions:   sodium chloride       PRN Meds:.sodium chloride flush, sodium chloride, acetaminophen, ondansetron **OR** ondansetron, morphine  .  sodium chloride          Allergies: Allergies   Allergen Reactions    Bactrim [Sulfamethoxazole-Trimethoprim]     Bee Venom     Ceftin [Cefuroxime]     Wasp Venom       Review of systems unable to obtain from patient. She gives inappropriate or unrelated answers no sentence construction. Physical Exam     Prefers to lay on her right side with hips knees flexed fetal position. Does moan with pain with palpation over the lower lumbar sacral area and right hip. Neurologic: Drowsy.  Oriented x0, inappropriate answers to questions, follows simple commands. Moves all Extremities, Strength Symmetrical and No Sensory Deficits   HEENT:              Head: No lacerations, bony step-offs, or abrasions and Midface stable to palpation              Eyes: PERRL, Corneas/Conjunctiva without lesions              Ears: No Hemotympanum              Nose: Septum Midline, No crepitus with motion; and No bloody discharge; Throat: Oral cavity without trauma . Upper and lower dentures in place  Neck: No midline tenderness and No lacerations/wounds  Pulmonary: External exam: no crepitus or pain with palpation, no contusions or abrasions; and Lung exam: breath sounds clear, no wheezes, no rales  Cardiovascular:               Pulses: Bilateral radial, femoral, DP and PT pulses are normal;  Abdomen: Appearance: Non-distended, No scars, lacerations, contusions; and Palpation: no tenderness              Rectal: Not performed  Pelvis/Perineum: No blood noted at the urethral meatus;  Musculoskeletal:               Back/Spine: Thoracolumbar spinal column non-tender; no step off or deformity; Extremities: BLE thigh TTP without swelling/ecchymosis/other soft tissue deformity. Patient laying down with knees bent and refuses to straighten out knee/bend at hip to test PROM. Remains on her right side in the fetal position. Unable to cooperate.       Objective Findings:     Vitals:   Vitals:    01/21/22 0713 01/21/22 0730 01/21/22 0749 01/21/22 0900   BP: 130/68 (!) 143/73 131/61 (!) 90/38   Pulse: 86   84   Resp: 16   14   Temp:       TempSrc:       SpO2: 99%  95% 95%   Weight:       Height:              Laboratory, Microbiology, Pathology, Radiology, Cardiology, Medications and Transcriptions reviewed  Scheduled Meds:   sodium chloride flush  5-40 mL IntraVENous 2 times per day     Continuous Infusions:   sodium chloride         Recent Labs     01/21/22 0315   WBC 8.9   HGB 13.1   HCT 39.5   MCV 85.9        Recent Labs     01/21/22 0315    K 4.3      CO2 26   BUN 14   CREATININE 0.93*     Recent Labs     01/21/22  0315   AST 29   ALT 21   BILITOT 0.7   ALKPHOS 171*     No results for input(s): LIPASE, AMYLASE in the last 72 hours. Recent Labs     01/21/22  0315 01/21/22  0445   PROT 7.1  --    INR  --  1.1     CT LUMBAR SPINE WO CONTRAST    Result Date: 1/21/2022  EXAM: CT LUMBAR SPINE WO CONTRAST HISTORY: Back and hip pain TECHNIQUE: Multiple contiguous axial images were obtained of the lumbar spine without contrast. Multiplanar reformats were obtained. COMPARISON: None available FINDINGS: Mildly displaced fractures of the bilateral transverse processes of L5. Nondisplaced fractures of the bilateral sacral ala. Angulation of S3 may represent nondisplaced/mildly angulated fracture. Lumbar vertebral body heights and intervertebral disc heights are maintained. Paraspinal soft tissues are normal. Two fluid density structures of the liver are identified (the largest measuring 2 cm) and demonstrate Hounsfield units compatible with simple fluid, most likely representing hepatic cysts. Atherosclerotic calcification of the abdominal aorta. A nonobstructing right renal calculus. Mildly displaced fractures of the bilateral transverse processes of L5. Nondisplaced bilateral sacral ala fractures. Possible nondisplaced but mildly angulated fracture of S3. All CT scans at this facility use dose modulation, iterative reconstruction, and/or weight based dosing when appropriate to reduce radiation dose to as low as reasonably achievable. Impression:     Osteoporosis with insufficiency bilateral sacral 1,2 left more than right and lumbar 5 bilateral transverse process fractures, may be old on the right. The S3 fracture most likely old. Plan:     These types of fractures are stable and will heal with time but will inhibit her mobility during the acute phase.   Pelvic trusses or braces can be used but the concern is creating pressure sores and causing more discomfort than help. Treatment is to keep her as comfortable as possible and allow her mobility based upon her pain levels. Need not be at bedrest and activities as her pain levels allow. Would not be aggressive with movements. No indication for neuro spine surgery procedure.         Comments:         Electronically signed by Stephie Coleman MD on 1/21/2022 at 10:02 AM

## 2022-01-21 NOTE — H&P
Trauma Consult / H & P Note    Reason for Consult: Trauma  Consulting Provider: Thi Drew MD      BASIC INJURY INFORMATION:  Level of activation: CAT 2  Mode of transport: EMS  Mechanism of injury: presumed unwitnessed fall  Complicating features: NA  Protective measures: NA    HISTORY OF PRESENT INJURY:   Hadley Thomson is a 80 y.o. female with a PMHx of dementia, HTN, CVA event, degenerative joint disease. Patient presented to [facility] [place] [facility] [place] [facility] on 1/21/2022 with concern for right hip pain and difficulty with ambulation. The staff at patient's [address] noticed yesterday AM that patient had complaint of right sided hip pain. Patient is also very ambulatory at baseline and has been having difficulty with ambulation over the last day. Facility staff express concern for possible unwitnessed fall over last 1-2 days. No AC/AP medications. Patient is a very poor historian. Unable to answer any orientation questions appropriately. When asked a question, she responds with an unrelated/inappropriate answer. PRIMARY SURVEY:  Airway: Intact  Breathing: Normal   Breath Sounds: Breath Sounds Equal Bilaterally  Circulation:    Pulses: Normal   Skin: Normal skin color, texture and turgor  Disability:   Pupils: PERRL   GCS:    Best Eyes: 3    Best Verbal: 4    Best Motor: 6    Total: 13    Vitals:   Vitals:    01/21/22 0730 01/21/22 0749 01/21/22 0900 01/21/22 1000   BP: (!) 143/73 131/61 (!) 90/38 (!) 104/40   Pulse:   84 84   Resp:   14 16   Temp:       TempSrc:       SpO2:  95% 95% 96%   Weight:       Height:             SECONDARY SURVEY:  Neurologic: Drowsy. Oriented x0, inappropriate answers to questions, follows simple commands.  Moves all Extremities, Strength Symmetrical and No Sensory Deficits   HEENT:   Head: No lacerations, bony step-offs, or abrasions and Midface stable to palpation   Eyes: PERRL, Corneas/Conjunctiva without lesions   Ears: No Hemotympanum   Nose: Septum Midline, No crepitus with motion; and No bloody discharge; Throat: Oral cavity without trauma . Upper and lower dentures in place  Neck: No midline tenderness and No lacerations/wounds  Pulmonary: External exam: no crepitus or pain with palpation, no contusions or abrasions; and Lung exam: breath sounds clear, no wheezes, no rales  Cardiovascular:    Pulses: Bilateral radial, femoral, DP and PT pulses are normal;  Abdomen: Appearance: Non-distended, No scars, lacerations, contusions; and Palpation: no tenderness   Rectal: Not performed  Pelvis/Perineum: No blood noted at the urethral meatus;  Musculoskeletal:    Back/Spine: Thoracolumbar spinal column non-tender; no step off or deformity; Extremities: BLE thigh TTP without swelling/ecchymosis/other soft tissue deformity. Patient laying down with knees bent and refuses to straighten out knee/bend at hip to test PROM    PAST MEDICAL HISTORY:  Past Medical History:   Diagnosis Date    Asymptomatic varicose veins of both lower extremities     Dementia with behavioral disturbance (HCC)     Elevated blood pressure reading     Full dentures     History of hip fracture 2012    Small vessel disease, cerebrovascular        PAST SURGICAL HISTORY:  Past Surgical History:   Procedure Laterality Date    HIP FRACTURE SURGERY  2012       PRE-ADMISSION MEDICATIONS:   Prior to Admission medications    Medication Sig Start Date End Date Taking? Authorizing Provider   Lactobacillus (ACIDOPHILUS) CAPS capsule Take 1 capsule by mouth 2 times daily    Historical Provider, MD   vitamin D3 (CHOLECALCIFEROL) 25 MCG (1000 UT) TABS tablet Take 1,000 Units by mouth daily    Historical Provider, MD   ketoconazole (NIZORAL) 2 % cream Apply topically daily Apply topically daily.     Historical Provider, MD   donepezil (ARICEPT) 5 MG tablet Take 5 mg by mouth daily  2/12/21   Historical Provider, MD   famotidine (PEPCID) 20 MG tablet  1/4/21   Historical Provider, MD   QUEtiapine (SEROQUEL) 25 MG tablet Take 0.5 tablets by mouth daily 2/19/21   Troy Zuniga MD       ALLERGIES:  Bactrim [sulfamethoxazole-trimethoprim], Bee venom, Ceftin [cefuroxime], and Wasp venom    SOCIAL HISTORY:   Social History     Socioeconomic History    Marital status:      Spouse name: Not on file    Number of children: Not on file    Years of education: Not on file    Highest education level: Not on file   Occupational History    Not on file   Tobacco Use    Smoking status: Unknown If Ever Smoked    Smokeless tobacco: Never Used   Substance and Sexual Activity    Alcohol use: Not on file    Drug use: Not on file    Sexual activity: Not on file   Other Topics Concern    Not on file   Social History Narrative    No children    States she has worked in [employer] in [address] [facility] dementia with behavior problems in 2020    Resides at [place] [address] Strain:     Difficulty of Paying Living Expenses: Not on file   Food Insecurity:     Worried About [address] in the Last Year: Not on file    [address] in the Last Year: Not on file   Transportation Needs:     Lack of Transportation (Medical): Not on file    Lack of Transportation (Non-Medical):  Not on file   Physical Activity:     Days of Exercise per Week: Not on file    Minutes of Exercise per Session: Not on file   Stress:     Feeling of Stress : Not on file   Social Connections:     Frequency of Communication with Friends and Family: Not on file    Frequency of Social Gatherings with Friends and Family: Not on file    Attends [employer]ist Services: Not on file    Active Member of Clubs or Organizations: Not on file    Attends Club or Organization Meetings: Not on file    Marital Status: Not on file   Intimate Partner Violence:     Fear of Current or Ex-Partner: Not on file    Emotionally Abused: Not on file    Physically Abused: Not on file    Sexually Abused: Not on file   Housing Stability:     Unable to Pay for Housing in the Last Year: Not on file    Number of Places Lived in the Last Year: Not on file    Unstable Housing in the Last Year: Not on file       FAMILY HISTORY:  Family History   Family history unknown: Yes           REVIEW OF SYSTEMS: Unable to obtain secondary to mental status         Except as noted above the remainder of the review of systems was reviewed and negative. BASIC LABS:   CBC with Differential:    Lab Results   Component Value Date    WBC 8.9 01/21/2022    RBC 4.60 01/21/2022    HGB 13.1 01/21/2022    HCT 39.5 01/21/2022     01/21/2022    MCV 85.9 01/21/2022    MCH 28.6 01/21/2022    MCHC 33.3 01/21/2022    RDW 14.1 01/21/2022    LYMPHOPCT 12.5 01/21/2022    MONOPCT 14.9 01/21/2022    EOSPCT 0.0 01/13/2021    BASOPCT 0.6 01/21/2022    MONOSABS 1.3 01/21/2022    LYMPHSABS 1.1 01/21/2022    EOSABS 0.0 01/21/2022    BASOSABS 0.1 01/21/2022     CMP:   Lab Results   Component Value Date     01/21/2022    K 4.3 01/21/2022     01/21/2022    CO2 26 01/21/2022    BUN 14 01/21/2022    CREATININE 0.93 (H) 01/21/2022    GLUCOSE 120 (H) 01/21/2022    CALCIUM 9.3 01/21/2022    PROT 7.1 01/21/2022    LABALBU 3.8 01/21/2022    BILITOT 0.7 01/21/2022    ALKPHOS 171 (H) 01/21/2022    AST 29 01/21/2022    ALT 21 01/21/2022    LABGLOM 57.7 (L) 01/21/2022    GFRAA >60.0 01/21/2022    GLOB 3.3 01/21/2022     Magnesium: No results found for: MG  Troponin:   Lab Results   Component Value Date    TROPONINI <0.10 01/13/2021     PT/INR:   Recent Labs     01/21/22  0445   PROTIME 14.5   INR 1.1     APTT:   Recent Labs     01/21/22  0445   APTT 40.6*     EtOH: No results found for: ETOH    RADIOLOGY: (Personally reviewed)  CTH:  - chronic involutional changes usually associated with microangiopathy as noted above.  No acute hemorrhage or ischemia    CT C-spine:   - There is multilevel spondylosis without evidence of acute fracture.       CT T/L or mass in the posterior portion of the right upper lobe. This may represent an endobronchial lesion or bronchial obstruction benign or malignant. May consider correlation with PET/CT evaluation and or tissue   Biopsy.  - 1.9 cm low density lesion in the right liver lobe near the dome which most likely represents a cyst  - There are posterior osteophytes at C5-6      PLAN:  1. Patient admitted overnight to Trauma R[facility] with plan for NSGY spine consult  2. Pan-scan obtained due to unreliable exam. No new findings. 3. Pain control with Tylenol 650mg q6hr, tramadol 25mg for mod/severe pain  4. NSGY recs:  a. Non-operative  b. Activity as tolerated, avoid aggressive movements  c. Pelvic trusses/braces could be used but would increase risk of pressure sores  5. Home medications started ([address] faxed over copy):  a. Donepezil 5mg daily  b. Nizoral 2% cream for left foot  c. Floranex 1 tablet BID  d. Seroquel 12.5mg daily  e. Vitamin D 1000 IU daily  6. [id number] [address] for regular diet, start bowel regimen (senokot-S bid, miralax daily)  7. AM CBC, BMP  8. mIVF 75cc overnight with HLIV timed for 6:30am  9. No need for abx  10. PT/OT ordered. Activity as tolerated  11. SCDs and lovenox 30mg BID for chemo ppx  12. Palliative care consult placed for code status change. Currently full code. Roger Workman PA-C  Trauma/Critical Care/General Surgery  [phone] ([id number])  [phone]     This patient's plan of care was discussed and made in collaboration with Trauma Attending physician, Ayse David MD.      4:00pm Addendum:  Patient reevaluated once admitted to [facility]. Now more alert/awake. GCS 14 (E4, V4, M6) and A&Ox 0-1 (knows self but not ). Patient with improved ability to follow commands and answer simple questions. Patient says \"I'm not sure if I have pain\". Physical exam only positive for sacral TTP.  Patient had received x2 doses of morphine prior to evaluation this AM. Suspect that patient's mental status this AM could be contributed to morphine. Patient's niece at bedside and is willing to have discussion with palliative care regarding code status change.     Aretha Martinez PA-C  Trauma/Critical Care  Emergency General Surgery

## 2022-01-21 NOTE — ED NOTES
Patient asleep in room. Equal chest rise and fall. No distress noted. Will continue to monitor.       Daniel Ferguson RN  01/21/22 0491

## 2022-01-21 NOTE — ED NOTES
Report called to Mau Sanches on 2N. All questions answered. Pt and belongings will be sent with transport to floor.       Aubrey Yang RN  01/21/22 7159

## 2022-01-21 NOTE — ED PROVIDER NOTES
[address] [facility] ED  EMERGENCY DEPARTMENT ENCOUNTER      Pt Name: Cierra Montero  MRN: [id number]  [place] 1940  Date of evaluation: 1/21/2022  Provider: Shivani Duckworth       Chief Complaint   Patient presents with    Hip Pain         HISTORY OF PRESENT ILLNESS   (Location/Symptom, Timing/Onset, Context/Setting, Quality, Duration, Modifying Factors, Severity)  Note limiting factors. Cierra Montero is a 80 y.o. female who per chart view has a  past medical history of dementia presents to the emergency department for evaluation of right-sided hip pain that began prior to arrival.  Patient lives in dementia unit at [facility]. This morning staff went into the room and attempted to help patient turn in bed when she began to complain of pain. Spoke with nursing staff at [facility]. They state there were no witnessed falls overnight however patient is very ambulatory and it is possible she did yesterday or two days ago. They state she can easily get herself up off the floor if she did fall. All day yesterday was having difficulty walking so they are assuming she fell 1-2 days ago. She yells out in pain and holds the right hip when moving in bed. EMS states that she would not attempt to stand or ambulate. History is extremely difficult to obtain from patient secondary to history of dementia. She points to the area of her right hip and buttocks when asking area of pain. She tells that she does not remember if she fell or not. She states she is otherwise feeling well. They did not medicate her prior to arrival. She is a full code. HPI    Nursing Notes were reviewed. REVIEW OF SYSTEMS    (2-9 systems for level 4, 10 or more for level 5)     Review of Systems   Unable to perform ROS: Dementia   Musculoskeletal: Positive for arthralgias. Except as noted above the remainder of the review of systems was reviewed and negative.        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 LACTOBACILLUS (ACIDOPHILUS) CAPS CAPSULE    Take 1 capsule by mouth 2 times daily    QUETIAPINE (SEROQUEL) 25 MG TABLET    Take 0.5 tablets by mouth daily    VITAMIN D3 (CHOLECALCIFEROL) 25 MCG (1000 UT) TABS TABLET    Take 1,000 Units by mouth daily       ALLERGIES     Bactrim [sulfamethoxazole-trimethoprim], Bee venom, Ceftin [cefuroxime], and Wasp venom    FAMILY HISTORY       Family History   Family history unknown: Yes          SOCIAL HISTORY       Social History     Socioeconomic History    Marital status:       Spouse name: Not on file    Number of children: Not on file    Years of education: Not on file    Highest education level: Not on file   Occupational History    Not on file   Tobacco Use    Smoking status: Unknown If Ever Smoked    Smokeless tobacco: Never Used   Substance and Sexual Activity    Alcohol use: Not on file    Drug use: Not on file    Sexual activity: Not on file   Other Topics Concern    Not on file   Social History Narrative    No children    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[place] [address] dementia with behavior problems in 2020    Resides at [place] [address] Strain:     Difficulty of Paying Living Expenses: Not on file   Food Insecurity:     Worried About Running Out of Food in the Last Year: Not on file    Ran Out of Food in the Last Year: Not on file   Transportation Needs:     Lack of Transportation (Medical): Not on file    Lack of Transportation (Non-Medical): Not on file   Physical Activity:     Days of Exercise per Week: Not on file    Minutes of Exercise per Session: Not on file   Stress:     Feeling of Stress : Not on file   Social Connections:     Frequency of Communication with Friends and Family: Not on file    Frequency of Social Gatherings with Friends and Family: Not on file    Attends Sikh Services: Not on file    Active Member of [address] [place] [address] [employer] or Organizations: Not on file    Attends Club or Organization Meetings: Not on file    Marital Status: Not on file   Intimate Partner Violence:     Fear of Current or Ex-Partner: Not on file    Emotionally Abused: Not on file    Physically Abused: Not on file    Sexually Abused: Not on file   Housing Stability:     Unable to Pay for Housing in the Last Year: Not on file    Number of [place] in the Last Year: Not on file    Unstable Housing in the Last Year: Not on file       SCREENINGS         K[place]so Coma Scale  Eye Opening: Spontaneous  Best Verbal Response: (S) Confused (pt is demented )  Best Motor Response: Obeys commands  Darcy Coma Scale Score: 14                     CIWA Assessment  BP: (!) 148/87  Pulse: 97                 PHYSICAL EXAM    (up to 7 for level 4, 8 or more for level 5)     ED Triage Vitals [01/21/22 0303]   BP Temp Temp Source Pulse Resp SpO2 Height Weight   (!) 148/87 98.8 °F (37.1 °C) Oral 97 16 97 % 5' 6\" (1.676 m) 130 lb (59 kg)       Physical Exam  Constitutional:       General: She is not in acute distress. Appearance: She is well-developed. She is not ill-appearing, toxic-appearing or diaphoretic. HENT:      Head: Normocephalic and atraumatic.       Nose: Nose normal.      Mouth/Throat:      Mouth: Mucous membranes are moist.   Eyes:      Pupils: Pupils are equal, round, and reactive to light. Cardiovascular:      Rate and Rhythm: Normal rate and regular rhythm. Heart sounds: No murmur heard. No friction rub. No gallop. Pulmonary:      Effort: Pulmonary effort is normal.      Breath sounds: Normal breath sounds. Abdominal:      General: There is no distension. Tenderness: There is no abdominal tenderness. Musculoskeletal:         General: No swelling. Cervical back: Normal and normal range of motion. Thoracic back: Normal.      Lumbar back: Normal.        Back:       Comments: Patient reporting TTP in areas listed above. No gross deformities. No wounds, rashes. Appears to be eczematous, dry areas across the back. no midline tenderness C-spine to L-spine. no deformities of the hips bilaterally. No point bony tenderness palpation. No swelling or erythema. no external rotation or shortening bilateral lower extremities. Distal pulses are intact and equal bilaterally. She is noted to be moving lower extremities spontaneously. Subjective portions of exam  limited 2/2 to hx of dementia. Skin:     General: Skin is warm and dry. Neurological:      Mental Status: She is alert. Mental status is at baseline. She is confused. Comments: A&O x 1, baseline, hx of dementia. DIAGNOSTIC RESULTS     EKG: All EKG's are interpreted by the Emergency Department Physician who either signs or Co-signs this chart in the absence of a cardiologist.    EKG shows NSR with HR 92, normal axis, normal intervals, no ST changes. L posterior fascicular block.        RADIOLOGY:   Non-plain film images such as CT, Ultrasound and MRI are read by the radiologist. [facility] radiographic images are visualized and preliminarily interpreted by the emergency physician with the below findings:    CT L spine shows fracture of bilateral L5 TP processes    CT R hip shows fx of bilateral sacral ala   Question buckle fracture involving the S3 vertebral body Interpretation per the Radiologist below, if available at the time of this note:    [facility]    (Results Pending)   [place]    (Results Pending)   CT FEMUR RIGHT WO CONTRAST    (Results Pending)         ED BEDSIDE ULTRASOUND:   Performed by ED Physician - none    LABS:  Labs Reviewed   COMPREHENSIVE METABOLIC PANEL - Abnormal; Notable for the following components:       Result Value    Glucose 120 (*)     CREATININE 0.93 (*)     GFR Non- 57.7 (*)     Alkaline Phosphatase 171 (*)     All other components within normal limits   CBC WITH AUTO DIFFERENTIAL - Abnormal; Notable for the following components:    Monocytes Absolute 1.3 (*)     All other components within normal limits   URINE RT REFLEX TO CULTURE   APTT   PROTIME-INR       All other labs were within normal range or not returned as of this dictation. EMERGENCY DEPARTMENT COURSE and DIFFERENTIAL DIAGNOSIS/MDM:   Vitals:    Vitals:    01/21/22 0303   BP: (!) 148/87   Pulse: 97   Resp: 16   Temp: 98.8 °F (37.1 °C)   TempSrc: Oral   SpO2: 97%   Weight: 130 lb (59 kg)   Height: 5' 6\" (1.676 m)       MDM    Patient is an 58-year-old female who presents to the ED for evaluation of right hip pain, likely but not witnessed fall over the last 2 to 3 days. Patient is typically fully ambulatory is not able to ambulate at this time. She is afebrile and hemodynamically stable. She is alert and oriented x1 which is her baseline secondary to dementia. Patient appears to be in significant pain with small movements in the bed she was treated with IV morphine and IV Zofran. Labs unremarkable. CT L spine shows fracture of bilateral L5 TP processes. CT R hip shows fx of bilateral sacral ala. Question buckle fracture involving the S3 vertebral body  CT RLE negative for fracture. Due to acute fractures as discussed above, inability to ambulate pt to be admitted.  Discussed case with Dr. Shanna Myers of trauma who will accept to his service. Transition orders placed. Pt stable for admission. REASSESSMENT          CRITICAL CARE TIME   Total Critical Care time was 0 minutes, excluding separately reportable procedures. There was a high probability of clinically significant/life threatening deterioration in the patient's condition which required my urgent intervention. CONSULTS:  None    PROCEDURES:  Unless otherwise noted below, none     Procedures        FINAL IMPRESSION      1. Lumbar transverse process fracture, closed, initial encounter ([facility] [address]    2. Closed fracture of sacrum, unspecified portion of sacrum, initial encounter ([facility] [address]    3. History of dementia    4. Unable to ambulate          DISPOSITION/PLAN   DISPOSITION Admitted 01/21/2022 05:49:05 AM      PATIENT REFERRED TO:  No follow-up provider specified. DISCHARGE MEDICATIONS:  New Prescriptions    No medications on file     Controlled Substances Monitoring:     No flowsheet data found.     (Please note that portions of this note were completed with a voice recognition program.  Efforts were made to edit the dictations but occasionally words are mis-transcribed.)    Larisa Prince PA-C (electronically signed)             Larisa Prince PA-C  01/21/22 901 Rupa Coles PA-C  01/21/22 9635

## 2022-01-21 NOTE — LETTER
INCIDENTAL FINDINGS REPORT  01/24/22    [address] [facility] record number: [id number]        Dear Jun Rivera:    While reviewing the diagnostic tests performed by the [facility], we discovered an abnormality that is not associated with the your current reason for admission. You have received a copy of the report from the diagnostic test(s), CAT scan of Chest with IV contrast with the abnormality(s) listed below. 1) Branching nodule or mass in the posterior portion of the right upper lobe. This may represent an endobronchial lesion or bronchial obstruction benign or malignant. May consider correlation with PET/CT evaluation and or tissue Biopsy. 2) 1.9 cm low density lesion in the right liver lobe near the dome which most likely represents a cyst  3) There are posterior osteophytes at C5-6        Per our discussion, you have been notified of these incidental findings and have agreed to follow up with a Primary Care Physician. If a Primary Care Physician is needed, please call [phone]. For any questions or concerns, please call our office at [phone].     Sincerely,        Farhana Bae PA-C  [facility]  Emergency General Surgery Service    cc:  Medical Records

## 2022-01-21 NOTE — ED TRIAGE NOTES
Arrives via EMS from [facility] for a complaint of rt hip pain. Patient is alert to self only. Unknown injury.

## 2022-01-21 NOTE — ED NOTES
Received reports from [facility], pt resting on cart depends changed and gowned. Placed in position of comfort, pt alert to name only, to be admitted per trauma from report.       Manfred Norman RN  01/21/22 3201

## 2022-01-21 NOTE — ED NOTES
Bed: 04  Expected date: 1/21/22  Expected time: 2:55 AM  Means of arrival: Ambulance  Comments:  80 F  Right hip pain     Bari Boston RN  01/21/22 7677

## 2022-01-22 LAB
ANION GAP SERPL CALCULATED.3IONS-SCNC: 10 MEQ/L (ref 9–15)
BILIRUBIN URINE: NEGATIVE
BLOOD, URINE: NEGATIVE
BUN BLDV-MCNC: 10 MG/DL (ref 8–23)
CALCIUM SERPL-MCNC: 8.5 MG/DL (ref 8.5–9.9)
CHLORIDE BLD-SCNC: 107 MEQ/L (ref 95–107)
CLARITY: CLEAR
CO2: 25 MEQ/L (ref 20–31)
COLOR: YELLOW
CREAT SERPL-MCNC: 0.74 MG/DL (ref 0.5–0.9)
GFR AFRICAN AMERICAN: >60
GFR NON-AFRICAN AMERICAN: >60
GLUCOSE BLD-MCNC: 86 MG/DL (ref 70–99)
GLUCOSE URINE: NEGATIVE MG/DL
HCT VFR BLD CALC: 33.1 % (ref 37–47)
HEMOGLOBIN: 11 G/DL (ref 12–16)
KETONES, URINE: NEGATIVE MG/DL
LEUKOCYTE ESTERASE, URINE: ABNORMAL
MCH RBC QN AUTO: 28.8 PG (ref 27–31.3)
MCHC RBC AUTO-ENTMCNC: 33.3 % (ref 33–37)
MCV RBC AUTO: 86.6 FL (ref 82–100)
NITRITE, URINE: NEGATIVE
PDW BLD-RTO: 14.2 % (ref 11.5–14.5)
PH UA: 6.5 (ref 5–9)
PLATELET # BLD: 197 K/UL (ref 130–400)
POTASSIUM REFLEX MAGNESIUM: 3.9 MEQ/L (ref 3.4–4.9)
PROTEIN UA: NEGATIVE MG/DL
RBC # BLD: 3.82 M/UL (ref 4.2–5.4)
SODIUM BLD-SCNC: 142 MEQ/L (ref 135–144)
SPECIFIC GRAVITY UA: 1.06 (ref 1–1.03)
URINE REFLEX TO CULTURE: YES
UROBILINOGEN, URINE: 0.2 E.U./DL
WBC # BLD: 8.5 K/UL (ref 4.8–10.8)

## 2022-01-22 PROCEDURE — 97162 PT EVAL MOD COMPLEX 30 MIN: CPT

## 2022-01-22 PROCEDURE — 36415 COLL VENOUS BLD VENIPUNCTURE: CPT

## 2022-01-22 PROCEDURE — 87186 SC STD MICRODIL/AGAR DIL: CPT

## 2022-01-22 PROCEDURE — 6370000000 HC RX 637 (ALT 250 FOR IP): Performed by: PHYSICIAN ASSISTANT

## 2022-01-22 PROCEDURE — 97535 SELF CARE MNGMENT TRAINING: CPT

## 2022-01-22 PROCEDURE — 80048 BASIC METABOLIC PNL TOTAL CA: CPT

## 2022-01-22 PROCEDURE — 6360000002 HC RX W HCPCS: Performed by: PHYSICIAN ASSISTANT

## 2022-01-22 PROCEDURE — G0378 HOSPITAL OBSERVATION PER HR: HCPCS

## 2022-01-22 PROCEDURE — 97166 OT EVAL MOD COMPLEX 45 MIN: CPT

## 2022-01-22 PROCEDURE — 85027 COMPLETE CBC AUTOMATED: CPT

## 2022-01-22 PROCEDURE — 96372 THER/PROPH/DIAG INJ SC/IM: CPT

## 2022-01-22 PROCEDURE — 87086 URINE CULTURE/COLONY COUNT: CPT

## 2022-01-22 PROCEDURE — 2580000003 HC RX 258: Performed by: SURGERY

## 2022-01-22 PROCEDURE — 87077 CULTURE AEROBIC IDENTIFY: CPT

## 2022-01-22 PROCEDURE — 99231 SBSQ HOSP IP/OBS SF/LOW 25: CPT | Performed by: PHYSICIAN ASSISTANT

## 2022-01-22 RX ADMIN — Medication 1000 UNITS: at 09:08

## 2022-01-22 RX ADMIN — Medication 5 ML: at 09:09

## 2022-01-22 RX ADMIN — KETOCONAZOLE: 20 CREAM TOPICAL at 09:09

## 2022-01-22 RX ADMIN — ACETAMINOPHEN 650 MG: 325 TABLET ORAL at 03:29

## 2022-01-22 RX ADMIN — SENNOSIDES AND DOCUSATE SODIUM 2 TABLET: 50; 8.6 TABLET ORAL at 21:43

## 2022-01-22 RX ADMIN — ENOXAPARIN SODIUM 30 MG: 100 INJECTION SUBCUTANEOUS at 21:43

## 2022-01-22 RX ADMIN — TRAMADOL HYDROCHLORIDE 25 MG: 50 TABLET, COATED ORAL at 21:43

## 2022-01-22 RX ADMIN — DONEPEZIL HYDROCHLORIDE 5 MG: 5 TABLET, FILM COATED ORAL at 09:08

## 2022-01-22 RX ADMIN — ACETAMINOPHEN 650 MG: 325 TABLET ORAL at 11:53

## 2022-01-22 RX ADMIN — QUETIAPINE FUMARATE 12.5 MG: 25 TABLET ORAL at 09:07

## 2022-01-22 RX ADMIN — SENNOSIDES AND DOCUSATE SODIUM 2 TABLET: 50; 8.6 TABLET ORAL at 09:08

## 2022-01-22 RX ADMIN — Medication 1 TABLET: at 21:42

## 2022-01-22 RX ADMIN — KETOCONAZOLE: 20 CREAM TOPICAL at 21:42

## 2022-01-22 RX ADMIN — Medication 1 TABLET: at 09:08

## 2022-01-22 RX ADMIN — ENOXAPARIN SODIUM 30 MG: 100 INJECTION SUBCUTANEOUS at 09:08

## 2022-01-22 RX ADMIN — Medication 10 ML: at 21:44

## 2022-01-22 RX ADMIN — ACETAMINOPHEN 650 MG: 325 TABLET ORAL at 17:35

## 2022-01-22 ASSESSMENT — PAIN SCALES - GENERAL
PAINLEVEL_OUTOF10: 0
PAINLEVEL_OUTOF10: 4
PAINLEVEL_OUTOF10: 5
PAINLEVEL_OUTOF10: 0

## 2022-01-22 NOTE — CARE COORDINATION
LSW met with the pt and her niece to discuss her DC plans. Pt is from [address] [place] [address]  assisted living but she will need rehab prior to returning to assisted living. Niece is looking at [facility] or [facility] for the pt. LSW to follow for transfer to SNF. Precert needed.

## 2022-01-22 NOTE — PROGRESS NOTES
Physical Therapy Med Surg Initial Assessment  Facility/Department: [facility]  Room: N227/N227-01     NAME: Jeanne Islas  : 1940 (80 y.o.)  MRN: [id number]  CODE STATUS: Full Code    Date of Service: 2022    Patient Diagnosis(es): Unable to ambulate [R26.2]  Closed fracture of sacrum, initial encounter ([facility] [address] [S32.10XA]  Lumbar transverse process fracture, closed, initial encounter ([facility] [address] [S32.009A]  History of dementia [Z86.59]  Closed fracture of sacrum, unspecified portion of sacrum, initial encounter [facility]) [S32.10XA]   Chief Complaint   Patient presents with    Hip Pain     Patient Active Problem List    Diagnosis Date Noted    Closed fracture of sacrum, initial encounter (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 Nondisplaced bilateral sacral ala fractures. Possible nondisplaced but mildly angulated fracture of S3.     Restrictions:  Restrictions/Precautions: Fall Risk (high fall risk per Marlyce Gander)     SUBJECTIVE:      Pain  Pre Treatment Pain Screening  Pain at present: 0  Scale Used: Numeric Score    Post Treatment Pain Screening:   Pain Screening  Patient Currently in Pain: Yes  Pain Assessment  Pain Level: 0    Prior Level of Function:  Social/Functional History  Lives With: Alone  Type of Home: Apartment  Home Layout: One level  Home Access: Level entry  Bathroom Shower/Tub: Walk-in shower  Bathroom Toilet: Handicap height  Bathroom Equipment: Grab bars in shower,Hand-held shower,Grab bars around toilet  ADL Assistance: Needs assistance (assist with shower transfers)  Homemaking Assistance: Needs assistance  Homemaking Responsibilities: No  Ambulation Assistance: Independent (Cane)  Transfer Assistance: Independent  Active : No  Patient's  Info: provided by family or facility    OBJECTIVE:   Vision: Within Functional Limits  Hearing: Within functional limits    Cognition:  Overall Orientation Status: Within Functional Limits  Follows Commands: Within Functional Limits     ROM:  RLE PROM: WFL  RLE General PROM: functionally able to sit EOB  LLE PROM: WFL  LLE General PROM: functionally able to sit EOB    Strength:  Strength RLE  Comment: functionally >/=3+/5  Strength LLE  Comment: functionally >/=3+/5    Neuro:  Balance  Sitting - Static: Fair  Sitting - Dynamic: Fair  Standing - Static: Fair;- (Min A with 2ww)  Standing - Dynamic: Fair;-     Motor Control  Gross Motor?: Exceptions  Comments: increased time for initiation; rigid  Sensation  Overall Sensation Status: WFL    Bed mobility  Supine to Sit: Maximum assistance  Sit to Supine: Moderate assistance  Comment: increased time and effort, VC and TC to sequence LE and UEs to perform mobility tasks    Transfers  Sit to Stand: Minimal Assistance  Stand to sit: Minimal Assistance  Comment: B UE support on bedrails  VC and TC to initiate with assistance, VC for upright posture and to correct posterior lean.      Ambulation  Ambulation?: Yes  Ambulation 1  Device: Rolling Walker  Assistance: Minimal assistance  Quality of Gait: flexed posture  Distance: 1 side step  Comments: Pt began turning, unable to safetly redirect pt with out 2nd person assisting with guarding,     Activity Tolerance  Activity Tolerance: Patient limited by cognitive status      PT Education  PT Education: Goals;PT Role;Plan of Care;General Safety;Transfer Training    ASSESSMENT:   Body structures, Functions, Activity limitations: Decreased safe awareness;Decreased balance;Decreased functional mobility ; Decreased cognition;Decreased strength;Decreased posture  Decision Making: Medium Complexity  History: high  Exam: high  Clinical Presentation: med    Specific instructions for Next Treatment: encourage OOB activity, transfers and progress ambulation 2 person for safety  Prognosis: Fair  Barriers to Learning: memory deficits    DISCHARGE RECOMMENDATIONS:  Discharge Recommendations: Continue to assess pending progress,Patient would benefit from continued therapy after discharge    Assessment: Pt demonstrates the above deficits and decline in functional mobility status placing them at increased risk for falls. Pt has history of unwitnessed falls at assisted living facility. Pt is limited by her memory deficits. Pt would benefit from physical therapy to address above deficits and allow for safe return home at highest level of function, decrease risk for falls, and improve QOL.   REQUIRES PT FOLLOW UP: Yes      PLAN OF CARE:  Plan  Times per week: 5-7  Times per day: Daily  Specific instructions for Next Treatment: encourage OOB activity, transfers and progress ambulation 2 person for safety  Current Treatment Recommendations: Strengthening,Functional Mobility Training,Neuromuscular Re-education,Home Exercise Program,Equipment Evaluation, Education, & procurement,Safety Education & Training,Modalities,Gait Training,Transfer Training,Balance Training,Stair training,Patient/Caregiver Education & Training,Positioning  Safety Devices  Type of devices: Left in bed,Call light within reach,Bed alarm in place,Nurse notified    Goals:  Patient goals : unable to state, family member at bedside wishes pt be able to return to walking and being able to return to assisted living  Long term goals  Long term goal 1: Bed mobility with SBA  Long term goal 2: Functional transfers with SBA  Long term goal 3: Amb 50ft with LRAD and SBA  Long term goal 4: SBA for HEP to improve LE strength and activity tolerance    [facility] (6 CLICK) BASIC MOBILITY  AM-PAC Inpatient Mobility Raw Score : 13     Therapy Time:   Individual   Time In 0931   Time Out 0958   Minutes 27    9 min bed mob/transfers      Yi Brush PT, 01/22/22 at 2:09 PM       Definitions for assistance levels  Independent = pt does not require any physical supervision or assistance from another person for activity completion. Device may be needed.   Stand by assistance = pt requires verbal cues or instructions from another person, close to but not touching, to perform the activity  Minimal assistance= pt performs 75% or more of the activity; assistance is required to complete the activity  Moderate assistance= pt performs 50% of the activity; assistance is required to complete the activity  Maximal assistance = pt performs 25% of the activity; assistance is required to complete the activity  Dependent = pt requires total physical assistance to accomplish the task

## 2022-01-22 NOTE — PROGRESS NOTES
See OT evaluation for all goals and OT POC.  Electronically signed by QUINCY Caicedo/L on 1/22/2022 at 4:32 PM

## 2022-01-22 NOTE — PROGRESS NOTES
TRAUMA DAILY PROGRESS NOTE      Patient Name: Marcy Otero  Admission Date 2022    Hospital Day: 0  Patient seen and examined on 2022    INTERVAL HISTORY/EVENTS     Background: Marcy Otero is a 80 y.o. female with a PMHx of dementia, HTN, CVA event, degenerative joint disease who presented to [facility] [place] ED on 2022 s/p presumed unwitnessed fall 1-2 days prior at [employer]. Brought to ED due to concern for right hip pain and difficulty with ambulation.      ED work up significant for bilateral L5 TP fractures, bilateral sacral ala fractures, possible buckle fracture involving S3 vertebral body. Patient admitted to Trauma Surgery RNF with NSGY consult. Palliative care consult placed for code status change. Currently full code. Hospital Course:  2022: Right hip pain and difficulty ambulating. Spine fractures as detailed above. Additional imaging negative. Admitted to Trauma RNF. NSGY consulted, non-op. Diet advanced. Home meds reconciled with [employer]. 24 Hour Events:  No acute events overnight. NSGY with final recs for non-operative management. Awaiting formal PT/OT eval and dispo recs. POA at bedside. No new concerns this am. Labs reviewed and unremarkable. VSS on room air. PHYSICAL EXAM     Vitals Average, Min and Max for last 24 hours:  Temp: Temp: 96.8 °F (36 °C);  Temp  Av.4 °F (36.3 °C)  Min: 96.8 °F (36 °C)  Max: 97.7 °F (36.5 °C)  Respirations: Resp  Av.3  Min: 14  Max: 18  Pulse: Pulse  Av.4  Min: 52  Max: 82  Blood Pressure: Systolic (62CBJ), TNR:777 , Min:112 , XEN:974   ; Diastolic (96LBE), GUP:39, Min:31, Max:92  SpO2: SpO2  Av.7 %  Min: 95 %  Max: 97 %    24hr Intake/Output: No intake or output data in the 24 hours ending 22 1027    Vitals: BP (!) 120/41   Pulse 82   Temp 96.8 °F (36 °C) (Oral)   Resp 16   Ht 5' 6\" (1.676 m)   Wt 130 lb (59 kg)   SpO2 96%   BMI 20.98 kg/m²     Physical Exam:  Constitutional: Lying supine in bed. Alert and approprietly conversant. Nice at bedside. HEENT: Atraumatic normocephalic. Cardiovascular: Regular rate and rhythm. Pulmonary: Clear to ausculation bilaterally on room air. . No wheezing, rhonchi or rales. Abdominal: Soft. Non-distended. Non-tender. Musculoskeletal: Good ROM in all extremities. No edema. Neurological: Alert, awake, and orientated x 0-1 (baseline). Motor and sensory grossly intact. No focal deficits. GCS of 15. LABORATORY RESULTS (LAST 24 HOURS)     CBC with Differential:    Lab Results   Component Value Date    WBC 8.5 01/22/2022    RBC 3.82 01/22/2022    HGB 11.0 01/22/2022    HCT 33.1 01/22/2022     01/22/2022    MCV 86.6 01/22/2022    MCH 28.8 01/22/2022    MCHC 33.3 01/22/2022    RDW 14.2 01/22/2022    LYMPHOPCT 12.5 01/21/2022    MONOPCT 14.9 01/21/2022    EOSPCT 0.0 01/13/2021    BASOPCT 0.6 01/21/2022    MONOSABS 1.3 01/21/2022    LYMPHSABS 1.1 01/21/2022    EOSABS 0.0 01/21/2022    BASOSABS 0.1 01/21/2022     CMP:    Lab Results   Component Value Date     01/22/2022    K 3.9 01/22/2022     01/22/2022    CO2 25 01/22/2022    BUN 10 01/22/2022    CREATININE 0.74 01/22/2022    GFRAA >60.0 01/22/2022    LABGLOM >60.0 01/22/2022    GLUCOSE 86 01/22/2022    PROT 7.1 01/21/2022    LABALBU 3.8 01/21/2022    CALCIUM 8.5 01/22/2022    BILITOT 0.7 01/21/2022    ALKPHOS 171 01/21/2022    AST 29 01/21/2022    ALT 21 01/21/2022     Magnesium:  No results found for: MG  PT/INR:    Lab Results   Component Value Date    PROTIME 14.5 01/21/2022    INR 1.1 01/21/2022     PTT:    Lab Results   Component Value Date    APTT 40.6 01/21/2022       IMAGING RESULTS (PERSONALLY REVIEWED)     All admission and follow up imaging reviewed. ASSESSMENT & PLAN     Diagnoses:  1.  S/p multiple unwitnessed falls  2. bilateral L5 TP fractures  3. bilateral sacral ala fractures  4. possible buckle fracture involving S3 vertebral body  5. Acute pain due to trauma    PMHx: dementia, HTN, CVA event, degenerative joint disease    Incidental Findings: Incidentals (need discussed)  - branching nodule or mass in the posterior portion of the right upper lobe. This may represent an endobronchial lesion or bronchial obstruction benign or malignant. May consider correlation with PET/CT evaluation and or tissue Biopsy.  - 1.9 cm low density lesion in the right liver lobe near the dome which most likely represents a cyst  - There are posterior osteophytes at C5-6      ASSESSMENT/PLAN:  Neurological: bilateral L5 TP fractures, bilateral sacral ala fractures, possible buckle fracture involving S3 vertebral body  - NSGY eval and rec's for non-operative management, activity as tolerated, avoid aggressive movements. Pelvic trusses/braces could be used but would increase risk of pressure sores. - Continue scheduled Tylenol 650mg q6hrs  - Continue Tramadol 25mg prn for moderate/severe pain  - Continue home Donepezil 5mg daily  - Continue home Seroquel 12.5mg daily     Cardiovascular: No acute events. - No indication for telemetry monitoring     Respiratory: No acute respiratory issues. Incidental findings as above to be discussed with POA. - Maintain O2 sats > 92%  - Encourage IS hourly     GI/Diet: No acute issues. - Continue regular diet  - Continue home Floranex  - Continue bowel regimen with senokot and prn      Renal/Electrolytes: No acute issues.  - HLIV  - BMP as needed     ID:   No active infection. Remains afebrile, normotensive, and without leukocytosis. - No indication for Abx     Heme: No acute issues  - No indication for transfusion. Endocrine: No acute issues  - No acute issues     MSK: Multiple falls at assisted living facility. bilateral L5 TP fractures, bilateral sacral ala fractures, possible buckle fracture involving S3 vertebral body. All fractures non-operative per NSGY.   - Spines: Cleared  - Weight Bearing Restrictions: as toelrated  - Activity: as tolerated. No restrictions  - PT/OT: eval and dispo rec's as able    Prophylaxis:   - SCDs and Lovenox 30mg BID for VTE PPx    Lines/Tubes/Drains:  - Maintain PIVs  - No indication for harris catheter, monitor for urinary retention    Dispo: Remain in on trauma service for PT/OT eval and dispo planning. Accom Status: General Status      - Follow up with PCP for routine post-hospitalization care. - No trauma follow up needed. Please call for any questions or concerns.     Ruby Blackburn, [address] [facility].  [phone] ([id number]-[phone])  [phone]     This patient's plan of care was discussed and made in collaboration with Trauma Attending physician, Martín Joseph MD.

## 2022-01-22 NOTE — PROGRESS NOTES
[facility] OCCUPATIONAL THERAPY EVALUATION - ACUTE     NAME: Hadley Thomson  : 1940 (80 y.o.)  MRN: [id number]  CODE STATUS: Full Code  [facility]: [facility] [place] [id number]    Date of Service: 2022    Patient Diagnosis(es): Unable to ambulate [R26.2]  Closed fracture of sacrum, initial encounter ([facility] [S32.10XA]  Lumbar transverse process fracture, closed, initial encounter ([facility] [S32.009A]  History of dementia [Z86.59]  Closed fracture of sacrum, unspecified portion of sacrum, initial encounter [facility]) [S32.10XA]   Chief Complaint   Patient presents with    Hip Pain     Patient Active Problem List    Diagnosis Date Noted    Closed fracture of sacrum, initial encounter ([facility] 2022    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, vertebra(e), init ([facility] 2022        Past Medical History:   Diagnosis Date    Asymptomatic varicose veins of both lower extremities     Dementia with behavioral disturbance ([facility]     Elevated blood pressure reading     Full dentures     History of hip fracture     Small vessel disease, cerebrovascular      Past Surgical History:   Procedure Laterality Date    HIP FRACTURE SURGERY          Restrictions  Restrictions/Precautions: Fall Risk (high fall risk per Lynette Wilson)     Safety Devices: Safety Devices  Safety Devices in place: Yes  Type of devices:  All fall risk precautions in place        Subjective       Pain Reassessment:   Pain Assessment  Patient Currently in Pain: Denies       Prior Level of Function:  Social/Functional History  Lives With: Alone  Type of Home: Apartment  Home Layout: One level  Home Access: Level entry  Bathroom Shower/Tub: Walk-in shower  Bathroom Toilet: Handicap height  Bathroom Equipment: Grab bars in shower,Hand-held shower,Grab bars around toilet  ADL Assistance: Needs assistance (assist with shower transfers)  Homemaking Assistance: Needs assistance  Homemaking Responsibilities: No  Ambulation Assistance: Independent ([place])  Transfer Assistance: Independent  Active : No  Patient's  Info: provided by family or facility    OBJECTIVE:     Orientation Status:  Orientation  Overall Orientation Status: Impaired (Oriented to name only)    Observation:  Observation/Palpation  Observation: no acute signs of distress    Cognition Status:  Cognition  Cognition Comment: follows simple commands with increased time and repetition, rephrase commands at times, hx of dementia per family report    Perception Status:  Perception  Overall Perceptual Status: WFL    Sensation Status:  Sensation  Overall Sensation Status: WFL    Vision and Hearing Status:  Vision  Vision: Within Functional Limits  Hearing  Hearing: Within functional limits     ROM:   LUE AROM (degrees)  LUE AROM : WFL  Left Hand AROM (degrees)  Left Hand AROM: WFL  RUE AROM (degrees)  RUE AROM : WFL  Right Hand AROM (degrees)  Right Hand AROM: WFL    Strength:  LUE Strength  L Hand General: 4/5  LUE Strength Comment: 4/5  RUE Strength  R Hand General: 4/5  RUE Strength Comment: 4/5    Coordination, Tone, Quality of Movement:    Tone RUE  RUE Tone: Normotonic  Tone LUE  LUE Tone: Normotonic  Coordination  Movements Are Fluid And Coordinated: No  Coordination and Movement description: Decreased speed,Right UE,Left UE    Hand Dominance:  Hand Dominance  Hand Dominance: Right    ADL Status:  ADL  Feeding: Setup  Grooming: Verbal cueing,Setup  UE Bathing: Setup,Verbal cueing  LE Bathing: Contact guard assistance,Setup,Verbal cueing  UE Dressing: Verbal cueing,Setup  LE Dressing: Verbal cueing,Minimal assistance  Toileting: Minimal assistance  Additional Comments: ADL's simulated, pt able to don/doff sock seated EOB  Toilet Transfers  Toilet Transfers Comments: anticipated Min A       Therapy key for assistance levels -   Independent = Pt. is able to perform task with no assistance but may require a device   Stand by assistance = Pt. does not perform task at an independent level but does not need physical assistance, requires verbal cues  Minimal, Moderate, Maximal Assistance = Pt. requires physical assistance (25%, 50%, 75% assist from helper) for task but is able to actively participate in task   Dependent = Pt. requires total assistance with task and is not able to actively participate with task completion     Functional Mobility:  Functional Mobility  Functional - Mobility Device: Other (hand held)  Activity: Other (side step HOB)  Assist Level: Minimal assistance  Transfers  Sit to stand: Minimal assistance  Stand to sit: Minimal assistance    Bed Mobility  Bed mobility  Supine to Sit: Moderate assistance  Sit to Supine: Contact guard assistance  Comment: VC's for sequencing, rephrase and pt able to follow command to bring feet into bed    Seated and Standing Balance:  Balance  Sitting Balance: Supervision  Standing Balance: Minimal assistance    Functional Endurance:  Activity Tolerance  Activity Tolerance: Patient Tolerated treatment well    D/C Recommendations:  OT D/C RECOMMENDATIONS  REQUIRES OT FOLLOW UP: Yes    Equipment Recommendations:  OT Equipment Recommendations  Other: Continue to assess    OT Education:   OT Education  OT Education: OT Role,Plan of Care  Barriers to Learning: decreased cognition    OT Follow Up:  OT D/C RECOMMENDATIONS  REQUIRES OT FOLLOW UP: Yes       Assessment/Discharge Disposition:  Assessment: Pt is an 80 y.o. female with above mentioned deficits impairing ability to complete ADL's at reported baseline. Pt may benefit from OT services to address deficits and maximize safety and function during ADL's.   Performance deficits / Impairments: Decreased functional mobility ,Decreased ADL status,Decreased balance  Prognosis: Good  Discharge Recommendations: Continue to assess pending progress  Decision Making: Medium Complexity  History: Multi comorb  Exam: 3 perf imp  Assistance / Modification: Min/Mod A    Six Click Score   How much help for putting on and taking off regular lower body clothing?: A Little  How much help for Bathing?: A Little  How much help for Toileting?: A Little  How much help for putting on and taking off regular upper body clothing?: A Little  How much help for taking care of personal grooming?: A Little  How much help for eating meals?: A Little  AM-PAC Inpatient Daily Activity Raw Score: 18  AM-PAC Inpatient ADL T-Scale Score : 38.66  ADL Inpatient CMS 0-100% Score: 46.65    Plan:  Plan  Times per week: 1-4  Plan weeks: LOS  Current Treatment Recommendations: Balance Training,Functional Mobility Training,Equipment Evaluation, Education, & procurement,Self-Care / ADL,Patient/Caregiver Education & Training,Safety Education & Training    Goals:   Patient will:    - Be Supervised in UB ADLs   - Be supervised in LB ADLs  - Be Supervised in ADL transfers without LOB  - Be Supervised in toileting tasks    Patient Goal:    To be safe   Discussed and agreed upon: Yes Comments:     Therapy Time:   OT Individual Minutes  Time In: 1602  Time Out: 1618  Minutes: 16    Eval: 16 minutes     Electronically signed by:     QUINCY Grant/L, OTR/L  1/22/2022, 4:31 PM

## 2022-01-23 VITALS
RESPIRATION RATE: 16 BRPM | WEIGHT: 130 LBS | DIASTOLIC BLOOD PRESSURE: 49 MMHG | OXYGEN SATURATION: 97 % | BODY MASS INDEX: 20.89 KG/M2 | TEMPERATURE: 98.2 F | HEIGHT: 66 IN | SYSTOLIC BLOOD PRESSURE: 116 MMHG | HEART RATE: 79 BPM

## 2022-01-23 PROCEDURE — G0378 HOSPITAL OBSERVATION PER HR: HCPCS

## 2022-01-23 PROCEDURE — 96372 THER/PROPH/DIAG INJ SC/IM: CPT

## 2022-01-23 PROCEDURE — 97116 GAIT TRAINING THERAPY: CPT

## 2022-01-23 PROCEDURE — 2580000003 HC RX 258: Performed by: SURGERY

## 2022-01-23 PROCEDURE — 99231 SBSQ HOSP IP/OBS SF/LOW 25: CPT | Performed by: PHYSICIAN ASSISTANT

## 2022-01-23 PROCEDURE — 6370000000 HC RX 637 (ALT 250 FOR IP): Performed by: PHYSICIAN ASSISTANT

## 2022-01-23 PROCEDURE — 6360000002 HC RX W HCPCS: Performed by: PHYSICIAN ASSISTANT

## 2022-01-23 RX ADMIN — SENNOSIDES AND DOCUSATE SODIUM 2 TABLET: 50; 8.6 TABLET ORAL at 09:23

## 2022-01-23 RX ADMIN — ENOXAPARIN SODIUM 30 MG: 100 INJECTION SUBCUTANEOUS at 21:52

## 2022-01-23 RX ADMIN — Medication 1 TABLET: at 21:52

## 2022-01-23 RX ADMIN — Medication 1000 UNITS: at 09:23

## 2022-01-23 RX ADMIN — ENOXAPARIN SODIUM 30 MG: 100 INJECTION SUBCUTANEOUS at 09:22

## 2022-01-23 RX ADMIN — Medication 1 TABLET: at 09:23

## 2022-01-23 RX ADMIN — KETOCONAZOLE: 20 CREAM TOPICAL at 13:30

## 2022-01-23 RX ADMIN — Medication 10 ML: at 09:30

## 2022-01-23 RX ADMIN — Medication 10 ML: at 21:53

## 2022-01-23 RX ADMIN — ACETAMINOPHEN 650 MG: 325 TABLET ORAL at 21:52

## 2022-01-23 RX ADMIN — ACETAMINOPHEN 650 MG: 325 TABLET ORAL at 09:30

## 2022-01-23 RX ADMIN — ACETAMINOPHEN 650 MG: 325 TABLET ORAL at 04:41

## 2022-01-23 RX ADMIN — QUETIAPINE FUMARATE 12.5 MG: 25 TABLET ORAL at 09:23

## 2022-01-23 RX ADMIN — DONEPEZIL HYDROCHLORIDE 5 MG: 5 TABLET, FILM COATED ORAL at 09:24

## 2022-01-23 RX ADMIN — ACETAMINOPHEN 650 MG: 325 TABLET ORAL at 16:24

## 2022-01-23 RX ADMIN — KETOCONAZOLE: 20 CREAM TOPICAL at 21:55

## 2022-01-23 RX ADMIN — SENNOSIDES AND DOCUSATE SODIUM 2 TABLET: 50; 8.6 TABLET ORAL at 21:52

## 2022-01-23 ASSESSMENT — PAIN DESCRIPTION - LOCATION
LOCATION: HIP
LOCATION: BUTTOCKS

## 2022-01-23 ASSESSMENT — PAIN DESCRIPTION - ORIENTATION: ORIENTATION: RIGHT

## 2022-01-23 ASSESSMENT — PAIN SCALES - GENERAL
PAINLEVEL_OUTOF10: 2
PAINLEVEL_OUTOF10: 0
PAINLEVEL_OUTOF10: 4
PAINLEVEL_OUTOF10: 4

## 2022-01-23 ASSESSMENT — PAIN SCALES - WONG BAKER: WONGBAKER_NUMERICALRESPONSE: 4

## 2022-01-23 ASSESSMENT — PAIN DESCRIPTION - DESCRIPTORS: DESCRIPTORS: ACHING;SORE

## 2022-01-23 ASSESSMENT — PAIN DESCRIPTION - PAIN TYPE
TYPE: ACUTE PAIN
TYPE: ACUTE PAIN

## 2022-01-23 NOTE — CARE COORDINATION
This LSW spoke with patient and niece, Starling Feast at bedside this am. Patient states that she is feeling well and has limited pain. D/C plan discussed at length. Patient and niece are hopeful that patient can return to her room in [address] [facility] unit at [facility]. If needed patient and niece are open to SNF placement.   Electronically signed by STEVEN Mosley, FELISHA on 1/23/22 at 10:02 AM EST

## 2022-01-23 NOTE — FLOWSHEET NOTE
Assessment completed. VS obtained. No c/o at this time. Family at bedside. Electronically signed by Carlie Sanchez RN on 1/23/2022 at 9:41 AM

## 2022-01-23 NOTE — PROGRESS NOTES
TRAUMA DAILY PROGRESS NOTE      Patient Name: Robert Noonan  Admission Date 2022    Hospital Day: 0  Patient seen and examined on 2022    INTERVAL HISTORY/EVENTS     Background: Robert Noonan is a 80 y.o. female with a PMHx of dementia, HTN, CVA event, degenerative joint disease who presented to [facility] [place] [facility] on 2022 s/p presumed unwitnessed fall 1-2 days prior at [employer]. Brought to ED due to concern for right hip pain and difficulty with ambulation.      ED work up significant for bilateral L5 TP fractures, bilateral sacral ala fractures, possible buckle fracture involving S3 vertebral body. Patient admitted to Trauma Surgery RNF with NSGY consult. Palliative care consult placed for code status change. Currently full code. Hospital Course:  2022: Right hip pain and difficulty ambulating. Spine fractures as detailed above. Additional imaging negative. Admitted to Trauma RNF. NSGY consulted, non-op. Diet advanced. Home meds reconciled with [employer]. 24 Hour Events:  No acute events overnight. PT/OT eval and dispo recs for SNF. POA at bedside. No new concerns this am. Labs reviewed and unremarkable. VSS on room air. PHYSICAL EXAM     Vitals Average, Min and Max for last 24 hours:  Temp: Temp: 97.3 °F (36.3 °C);  Temp  Av °F (36.7 °C)  Min: 97.3 °F (36.3 °C)  Max: 98.5 °F (36.9 °C)  Respirations: Resp  Av  Min: 16  Max: 16  Pulse: Pulse  Av.8  Min: 79  Max: 101  Blood Pressure: Systolic (42AUC), CXT:093 , Min:88 , PXN:374   ; Diastolic (44EDU), PASQUALE:61, Min:63, Max:69  SpO2: SpO2  Av.5 %  Min: 94 %  Max: 97 %    24hr Intake/Output:     Intake/Output Summary (Last 24 hours) at 2022 1104  Last data filed at 2022 0711  Gross per 24 hour   Intake 480 ml   Output --   Net 480 ml       Vitals: BP 88/69   Pulse 90   Temp 97.3 °F (36.3 °C) (Oral)   Resp 16   Ht 5' 6\" (1.676 m)   Wt 130 lb (59 kg)   SpO2 97%   BMI 20.98 kg/m²     Physical Exam:  Constitutional: Lying supine in bed. Alert and approprietly conversant. Nice at bedside. HEENT: Atraumatic normocephalic. Cardiovascular: Regular rate and rhythm. Pulmonary: Clear to ausculation bilaterally on room air. . No wheezing, rhonchi or rales. Abdominal: Soft. Non-distended. Non-tender. Musculoskeletal: Good ROM in all extremities. No edema. Neurological: Alert, awake, and orientated x 0-1 (baseline). Motor and sensory grossly intact. No focal deficits. GCS of 15. LABORATORY RESULTS (LAST 24 HOURS)     CBC with Differential:    Lab Results   Component Value Date    WBC 8.5 01/22/2022    RBC 3.82 01/22/2022    HGB 11.0 01/22/2022    HCT 33.1 01/22/2022     01/22/2022    MCV 86.6 01/22/2022    MCH 28.8 01/22/2022    MCHC 33.3 01/22/2022    RDW 14.2 01/22/2022    LYMPHOPCT 12.5 01/21/2022    MONOPCT 14.9 01/21/2022    EOSPCT 0.0 01/13/2021    BASOPCT 0.6 01/21/2022    MONOSABS 1.3 01/21/2022    LYMPHSABS 1.1 01/21/2022    EOSABS 0.0 01/21/2022    BASOSABS 0.1 01/21/2022     CMP:    Lab Results   Component Value Date     01/22/2022    K 3.9 01/22/2022     01/22/2022    CO2 25 01/22/2022    BUN 10 01/22/2022    CREATININE 0.74 01/22/2022    GFRAA >60.0 01/22/2022    LABGLOM >60.0 01/22/2022    GLUCOSE 86 01/22/2022    PROT 7.1 01/21/2022    LABALBU 3.8 01/21/2022    CALCIUM 8.5 01/22/2022    BILITOT 0.7 01/21/2022    ALKPHOS 171 01/21/2022    AST 29 01/21/2022    ALT 21 01/21/2022     Magnesium:  No results found for: MG  PT/INR:    Lab Results   Component Value Date    PROTIME 14.5 01/21/2022    INR 1.1 01/21/2022     PTT:    Lab Results   Component Value Date    APTT 40.6 01/21/2022       IMAGING RESULTS (PERSONALLY REVIEWED)     All admission and follow up imaging reviewed. ASSESSMENT & PLAN     Diagnoses:  1.  S/p multiple unwitnessed falls  2. bilateral L5 TP fractures  3. bilateral sacral ala fractures  4. possible buckle fracture involving S3 vertebral body  5. Acute pain due to trauma    PMHx: dementia, HTN, CVA event, degenerative joint disease    Incidental Findings: Incidentals (need discussed)  - branching nodule or mass in the posterior portion of the right upper lobe. This may represent an endobronchial lesion or bronchial obstruction benign or malignant. May consider correlation with PET/CT evaluation and or tissue Biopsy.  - 1.9 cm low density lesion in the right liver lobe near the dome which most likely represents a cyst  - There are posterior osteophytes at C5-6      ASSESSMENT/PLAN:  Neurological: bilateral L5 TP fractures, bilateral sacral ala fractures, possible buckle fracture involving S3 vertebral body  - NSGY eval and rec's for non-operative management, activity as tolerated, avoid aggressive movements. Pelvic trusses/braces could be used but would increase risk of pressure sores. - Continue scheduled Tylenol 650mg q6hrs  - Continue Tramadol 25mg prn for moderate/severe pain  - Continue home Donepezil 5mg daily  - Continue home Seroquel 12.5mg daily     Cardiovascular: No acute events. - No indication for telemetry monitoring     Respiratory: No acute respiratory issues. Incidental findings as above to be discussed with POA. - Maintain O2 sats > 92%  - Encourage IS hourly     GI/Diet: No acute issues. - Continue regular diet  - Continue home Floranex  - Continue bowel regimen with senokot and prn      Renal/Electrolytes: No acute issues.  - HLIV  - BMP as needed     ID:   No active infection. Remains afebrile, normotensive, and without leukocytosis. - No indication for Abx     Heme: No acute issues  - No indication for transfusion. Endocrine: No acute issues  - No acute issues     MSK: Multiple falls at assisted living facility. bilateral L5 TP fractures, bilateral sacral ala fractures, possible buckle fracture involving S3 vertebral body. All fractures non-operative per NSGY.   - Spines: Cleared  - Weight Bearing Restrictions: as toelrated  - Activity: as tolerated. No restrictions  - PT/OT: eval and dispo rec's as able    Prophylaxis:   - SCDs and Lovenox 30mg BID for VTE PPx    Lines/Tubes/Drains:  - Maintain PIVs  - No indication for harris catheter, monitor for urinary retention    Dispo: Remain in on trauma service for PT/OT eval and dispo planning. Accom Status: General Status      - Follow up with PCP for routine post-hospitalization care. - No trauma follow up needed. Please call for any questions or concerns.     Gurinder Costa, [address] [facility].  [phone] (2B-8e) [phone]     This patient's plan of care was discussed and made in collaboration with Trauma Attending physician, Jae Veronica MD.

## 2022-01-23 NOTE — PROGRESS NOTES
Physical Therapy Med Surg Daily Treatment Note  Facility/Department: [address] NEURO  Room: [facility]/N227-       NAME: Jerry Cade  : 1940 (81 y.o.)  MRN: [id number]  CODE STATUS: Full Code    Date of Service: 2022    Patient Diagnosis(es): Unable to ambulate [R26.2]  Closed fracture of sacrum, initial encounter ([facility] [S32.10XA]  Lumbar transverse process fracture, closed, initial encounter ([facility] [S32.009A]  History of dementia [Z86.59]  Closed fracture of sacrum, unspecified portion of sacrum, initial encounter 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, vertebra(e), init ([facility] 2022        Past Medical History:   Diagnosis Date    Asymptomatic varicose veins of both lower extremities     Dementia with behavioral disturbance ([facility]     Elevated blood pressure reading     Full dentures     History of hip fracture     Small vessel disease, cerebrovascular      Past Surgical History:   Procedure Laterality Date    HIP FRACTURE SURGERY         Restrictions  Restrictions/Precautions: Fall Risk (high fall risk per Kenny Sohrt)    SUBJECTIVE   General  Chart Reviewed: Yes  Subjective  Subjective: Neice present for session. Patient agreeable to participate with PT, denies pain.     Pre-Session Pain Report     Pain Screening  Patient Currently in Pain: No       Post-Session Pain Report   None         OBJECTIVE             Transfers  Sit to Stand: Minimal Assistance  Stand to sit: Minimal Assistance  Comment: Focus on safe hand placements    Ambulation  Ambulation?: Yes  Ambulation 1  Surface: level tile  Device: Rolling Walker  Assistance: Minimal assistance  Quality of Gait: Flexed posture, difficulty manuevering [employer] especially with directional changes  Distance: 5ft, 30ft  Comments: 2nd person used as SBA for safety              Neuromuscular Education  Neuromuscular Comments: Standing weightshifts with limited movements, possibly due to cognitive status     Exercises  Hip Flexion: x10  Hip Abduction: Abduction/adduction x10  Knee Long Arc Quad: x10                               ASSESSMENT   Body structures, Functions, Activity limitations: Decreased safe awareness;Decreased balance;Decreased functional mobility ; Decreased cognition;Decreased strength;Decreased posture  Assessment: Patient with improved tolerance, no pain reports or expressed. Able to progress distance though increased difficulty manuevering [employer]. Limited carry over with directions, possibly due to cognitive status. Prognosis: Fair  REQUIRES PT FOLLOW UP: Yes     Discharge Recommendations:  Continue to assess pending progress,Patient would benefit from continued therapy after discharge    Goals  Long term goals  Long term goal 1: Bed mobility with SBA  Long term goal 2: Functional transfers with SBA  Long term goal 3: Amb 50ft with LRAD and SBA  Long term goal 4: SBA for HEP to improve LE strength and activity tolerance  Patient Goals   Patient goals : unable to state, family member at bedside wishes pt be able to return to walking and being able to return to assisted living    PLAN    Times per week: 5-7  Times per day: Daily        AMPAC (6 CLICK) BASIC MOBILITY  AM-PAC Inpatient Mobility Raw Score : 17   Therapy Time   Individual   Time In 1135   Time Out 1155   Minutes 20     Timed Code Treatment Minutes: 20 Minutes   Gait: 10  There ex: [address], PTA, 01/23/22 at 12:21 PM         Definitions for assistance levels  Independent = pt does not require any physical supervision or assistance from another person for activity completion. Device may be needed.   Stand by assistance = pt requires verbal cues or instructions from another person, close to but not touching, to perform the activity  Minimal assistance= pt performs 75% or more of the activity; assistance is required to complete the activity  Moderate assistance= pt performs 50% of the activity; assistance is required to complete the activity  Maximal assistance = pt performs 25% of the activity; assistance is required to complete the activity  Dependent = pt requires total physical assistance to accomplish the task

## 2022-01-24 PROBLEM — M80.08XA AGE-REL OSTEOPOR W CURRENT PATH FRACTURE, VERTEBRA(E), INIT (HCC): Status: RESOLVED | Noted: 2022-01-21 | Resolved: 2022-01-24

## 2022-01-24 LAB
ORGANISM: ABNORMAL
URINE CULTURE, ROUTINE: ABNORMAL

## 2022-01-24 PROCEDURE — 2580000003 HC RX 258: Performed by: SURGERY

## 2022-01-24 PROCEDURE — 99221 1ST HOSP IP/OBS SF/LOW 40: CPT | Performed by: NURSE PRACTITIONER

## 2022-01-24 PROCEDURE — 6370000000 HC RX 637 (ALT 250 FOR IP): Performed by: PHYSICIAN ASSISTANT

## 2022-01-24 PROCEDURE — 97116 GAIT TRAINING THERAPY: CPT

## 2022-01-24 PROCEDURE — 99231 SBSQ HOSP IP/OBS SF/LOW 25: CPT | Performed by: PHYSICIAN ASSISTANT

## 2022-01-24 PROCEDURE — 96372 THER/PROPH/DIAG INJ SC/IM: CPT

## 2022-01-24 PROCEDURE — G0378 HOSPITAL OBSERVATION PER HR: HCPCS

## 2022-01-24 PROCEDURE — 6360000002 HC RX W HCPCS: Performed by: PHYSICIAN ASSISTANT

## 2022-01-24 PROCEDURE — 97112 NEUROMUSCULAR REEDUCATION: CPT

## 2022-01-24 RX ORDER — TRAMADOL HYDROCHLORIDE 50 MG/1
25 TABLET ORAL EVERY 6 HOURS PRN
Qty: 14 TABLET | Refills: 0 | Status: SHIPPED | OUTPATIENT
Start: 2022-01-24 | End: 2022-01-31

## 2022-01-24 RX ORDER — ACETAMINOPHEN 325 MG/1
650 TABLET ORAL EVERY 4 HOURS PRN
Qty: 120 TABLET | Refills: 0 | Status: SHIPPED | OUTPATIENT
Start: 2022-01-24 | End: 2022-02-07

## 2022-01-24 RX ADMIN — SENNOSIDES AND DOCUSATE SODIUM 2 TABLET: 50; 8.6 TABLET ORAL at 09:17

## 2022-01-24 RX ADMIN — Medication 1 TABLET: at 09:16

## 2022-01-24 RX ADMIN — Medication 1000 UNITS: at 09:17

## 2022-01-24 RX ADMIN — Medication 10 ML: at 09:18

## 2022-01-24 RX ADMIN — ENOXAPARIN SODIUM 30 MG: 100 INJECTION SUBCUTANEOUS at 10:37

## 2022-01-24 RX ADMIN — ACETAMINOPHEN 650 MG: 325 TABLET ORAL at 04:58

## 2022-01-24 RX ADMIN — QUETIAPINE FUMARATE 12.5 MG: 25 TABLET ORAL at 09:18

## 2022-01-24 RX ADMIN — DONEPEZIL HYDROCHLORIDE 5 MG: 5 TABLET, FILM COATED ORAL at 09:17

## 2022-01-24 RX ADMIN — ACETAMINOPHEN 650 MG: 325 TABLET ORAL at 09:17

## 2022-01-24 ASSESSMENT — ENCOUNTER SYMPTOMS
NAUSEA: 0
ABDOMINAL DISTENTION: 0
STRIDOR: 0
ANAL BLEEDING: 0
APNEA: 0
ABDOMINAL PAIN: 0
COLOR CHANGE: 0
COUGH: 0
RECTAL PAIN: 0
VOICE CHANGE: 0
CONSTIPATION: 0
CHOKING: 0
WHEEZING: 0
BACK PAIN: 0
BLOOD IN STOOL: 0
EYE DISCHARGE: 0
SHORTNESS OF BREATH: 0
SORE THROAT: 0
CHEST TIGHTNESS: 0
TROUBLE SWALLOWING: 0
DIARRHEA: 0
VOMITING: 0

## 2022-01-24 ASSESSMENT — PAIN SCALES - WONG BAKER: WONGBAKER_NUMERICALRESPONSE: 0

## 2022-01-24 ASSESSMENT — PAIN SCALES - GENERAL
PAINLEVEL_OUTOF10: 2
PAINLEVEL_OUTOF10: 0
PAINLEVEL_OUTOF10: 3
PAINLEVEL_OUTOF10: 0

## 2022-01-24 NOTE — DISCHARGE INSTR - COC
Continuity of Care Form    Patient Name: Nohemi Julien   :  1940  MRN:  [id number]    Admit date:  2022  Discharge date:  22    Code Status Order: Full Code   Advance Directives:      Admitting Physician:  Sherine Crespo MD  PCP: Bre Tadeo MD    Discharging Nurse: [facility] Unit/Room#: N227/N227-01  Discharging Unit Phone Number: ***    Emergency Contact:   Extended Emergency Contact Information  Primary Emergency Contact: [address] [facility], [address] [place] [address] Phone: [phone]  Relation: Niece/Nephew    Past Surgical History:  Past Surgical History:   Procedure Laterality Date    HIP FRACTURE SURGERY         Immunization History: There is no immunization history on file for this patient. 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, vertebra(e), init [facility] M80.08XA       Isolation/Infection:   Isolation            No Isolation          Patient Infection Status       None to display            Nurse Assessment:  Last Vital Signs: BP (!) 116/49   Pulse 79   Temp 98.2 °F (36.8 °C) (Oral)   Resp 16   Ht 5' 6\" (1.676 m)   Wt 130 lb (59 kg)   SpO2 97%   BMI 20.98 kg/m²     Last documented pain score (0-10 scale): Pain Level: 0  Last Weight:   Wt Readings from Last 1 Encounters:   22 130 lb (59 kg)     Mental Status:  disoriented    IV Access:  - None    Nursing Mobility/ADLs:  Walking   Assisted  Transfer  Assisted  Bathing  Assisted  Dressing  Assisted  Toileting  Assisted  Feeding  Assisted  Med Admin  Assisted  Med Delivery   whole    Wound Care Documentation and Therapy:        Elimination:  Continence: Bowel: No  Bladder: No  Urinary Catheter: None   Colostomy/Ileostomy/Ileal Conduit: No       Date of Last Bm:22  No intake or output data in the 24 hours ending 22 1153  I/O last 3 completed shifts:   In: 300 [P.O.:300]  Out: - Safety Concerns:     History of Falls (last 30 days)    Impairments/Disabilities:      None    Nutrition Therapy:  Current Nutrition Therapy:   - Oral Diet:  General    Routes of Feeding: Oral  Liquids: Thin Liquids  Daily Fluid Restriction: no  Last Modified Barium Swallow with Video (Video Swallowing Test): not done    Treatments at the Time of Hospital Discharge:   Respiratory Treatments:NA  Oxygen Therapy:  is not on home oxygen therapy. Ventilator:    - No ventilator support    Rehab Therapies: Physical Therapy and Occupational Therapy  Weight Bearing Status/Restrictions: No weight bearing restirctions  Other Medical Equipment (for information only, NOT a DME order):  walker  Other Treatments: NA    Patient's personal belongings (please select all that are sent with patient): All personal belongings sent with pt    RN SIGNATURE:  Electronically signed by Elisa Herrmann RN on 1/24/22 at 1:12 PM EST    CASE MANAGEMENT/SOCIAL WORK SECTION    Inpatient Status Date: ***    Readmission Risk Assessment Score:  Readmission Risk              Risk of Unplanned Readmission:  0           Discharging to Facility/ Agency   Name:   Address:  Phone:  Fax:    Dialysis Facility (if applicable)   Name:  Address:  Dialysis Schedule:  Phone:  Fax:    / signature: {Esignature:[id number]}    PHYSICIAN SECTION    Prognosis: Good    Condition at Discharge: Stable    Rehab Potential (if transferring to Rehab): N/A    Recommended Labs or Other Treatments After Discharge:  - Follow up with PCP for routine post-hospitalization care and incidental findings.  - No trauma follow up needed. Physician Certification: I certify the above information and transfer of Loren Harvey  is necessary for the continuing treatment of the diagnosis listed and that she requires Assisted Living for greater 30 days.      Update Admission H&P: No change in H&P    PHYSICIAN SIGNATURE:  Electronically signed by Anthony Nunn MD

## 2022-01-24 NOTE — FLOWSHEET NOTE
Discharge instructions reviewed with pt and dylon. samreenece to transport pt back to [address] [place] [address] assisted living [facility] via car. All belongings sent with pt. Electronically signed by Vilinda Epley, RN on 1/24/2022 at 1:49 PM

## 2022-01-24 NOTE — PROGRESS NOTES
Patient seen lying in bed in no acute distress. No acute events overnight. Patient is alert to person which is her baseline with history of dementia. The patient appears to be comfortable and denies any pain. She may participate in activity as pain allows. NO aggressive movements. Patient pending discharge disposition and will either return to memory unit at [facility] or SNF. Neurosurgery to sign off. Please call with any questions or concerns.

## 2022-01-24 NOTE — FLOWSHEET NOTE
Assessment completed. VS obtained. Alert to self only. Up to bathroom with standby assist. No s/s pain or discomfort. Electronically signed by Flakito Medina RN on 1/24/2022 at 9:06 AM
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
## 2022-01-24 NOTE — CARE COORDINATION
LSW along with the care team met with the pt and her niece to discuss her DC plan. Pt is confused but follows directions. We discussed safety of returning to [address] [place] [address] assisted living vs SNF for rehab. LSW spoke with Wandy Rose at [address] [place] [address] and they feel that the pt could return to them today and they will provide out patient PT/OT for her there. LSW discussed this with the pt and her niece and they agree that returning to [address] [place] [address] is the best plan for pt. Niece will transport the pt back to [address] [place] [address] today.

## 2022-01-24 NOTE — PROGRESS NOTES
TRAUMA DAILY PROGRESS NOTE      Patient Name: Heena Booth  Admission Date 2022    Hospital Day: 0  Patient seen and examined on 2022    INTERVAL HISTORY/EVENTS     Background: Heena Booth is a 80 y.o. female with a PMHx of dementia, HTN, CVA event, degenerative joint disease who presented to [facility] EMERGENCY [facility] AT [place] ED on 2022 s/p presumed unwitnessed fall 1-2 days prior at [employer]. Brought to ED due to concern for right hip pain and difficulty with ambulation.      ED work up significant for bilateral L5 TP fractures, bilateral sacral ala fractures, possible buckle fracture involving S3 vertebral body. Patient admitted to Trauma Surgery RNF with NSGY consult. Palliative care consult placed for code status change. Currently full code. Hospital Course:  2022: Right hip pain and difficulty ambulating. Spine fractures as detailed above. Additional imaging negative. Admitted to Trauma RNF. NSGY consulted, non-op. Diet advanced. Home meds reconciled with [employer]. 2022: medically cleared for discharge. 2022: PT/OT eval and dispo recs for SNF.  2022: No change in care plan. Incidental findings discussed. 24 Hour Events:  No acute events overnight. PT/OT eval and dispo recs for SNF. Appreciate SM/SW assistance. POA at bedside. Incidental findings discussed. No new concerns this am. No new labs or imaging to review. VSS on room air. PHYSICAL EXAM     Vitals Average, Min and Max for last 24 hours:  Temp: Temp: 98.2 °F (36.8 °C);  Temp  Av °F (36.7 °C)  Min: 97.3 °F (36.3 °C)  Max: 98.4 °F (36.9 °C)  Respirations: Resp  Av  Min: 16  Max: 16  Pulse: Pulse  Av.7  Min: 79  Max: 90  Blood Pressure: Systolic (35SCH), VIT:617 , Min:88 , BQA:341   ; Diastolic (80TBG), HWY:90, Min:49, Max:69  SpO2: SpO2  Av %  Min: 97 %  Max: 100 %    24hr Intake/Output:   No intake or output data in the 24 hours ending 22 0723    Vitals: BP (!) 116/49 Pulse 79   Temp 98.2 °F (36.8 °C) (Oral)   Resp 16   Ht 5' 6\" (1.676 m)   Wt 130 lb (59 kg)   SpO2 97%   BMI 20.98 kg/m²     Physical Exam:  Constitutional: Sitting upright in bedside chair. POA at bedside. HEENT: Atraumatic normocephalic. Cardiovascular: Regular rate and rhythm. Pulmonary: Clear to ausculation bilaterally on room air. No wheezing, rhonchi or rales. Abdominal: Soft. Non-distended. Non-tender. Musculoskeletal: Good ROM in all extremities. No edema. Neurological: Alert, awake, and orientated x 0-1 (baseline). Motor and sensory grossly intact. No focal deficits. GCS of 15. LABORATORY RESULTS (LAST 24 HOURS)     CBC with Differential:    Lab Results   Component Value Date    WBC 8.5 01/22/2022    RBC 3.82 01/22/2022    HGB 11.0 01/22/2022    HCT 33.1 01/22/2022     01/22/2022    MCV 86.6 01/22/2022    MCH 28.8 01/22/2022    MCHC 33.3 01/22/2022    RDW 14.2 01/22/2022    LYMPHOPCT 12.5 01/21/2022    MONOPCT 14.9 01/21/2022    EOSPCT 0.0 01/13/2021    BASOPCT 0.6 01/21/2022    MONOSABS 1.3 01/21/2022    LYMPHSABS 1.1 01/21/2022    EOSABS 0.0 01/21/2022    BASOSABS 0.1 01/21/2022     CMP:    Lab Results   Component Value Date     01/22/2022    K 3.9 01/22/2022     01/22/2022    CO2 25 01/22/2022    BUN 10 01/22/2022    CREATININE 0.74 01/22/2022    GFRAA >60.0 01/22/2022    LABGLOM >60.0 01/22/2022    GLUCOSE 86 01/22/2022    PROT 7.1 01/21/2022    LABALBU 3.8 01/21/2022    CALCIUM 8.5 01/22/2022    BILITOT 0.7 01/21/2022    ALKPHOS 171 01/21/2022    AST 29 01/21/2022    ALT 21 01/21/2022     Magnesium:  No results found for: MG  PT/INR:    Lab Results   Component Value Date    PROTIME 14.5 01/21/2022    INR 1.1 01/21/2022     PTT:    Lab Results   Component Value Date    APTT 40.6 01/21/2022       IMAGING RESULTS (PERSONALLY REVIEWED)     All admission and follow up imaging reviewed. ASSESSMENT & PLAN     Diagnoses:  1.  S/p multiple unwitnessed falls  2. bilateral L5 TP fractures  3. bilateral sacral ala fractures  4. possible buckle fracture involving S3 vertebral body  5. Acute pain due to trauma    PMHx: dementia, HTN, CVA event, degenerative joint disease    Incidental Findings: Discussed at bedside with patient and POA. - branching nodule or mass in the posterior portion of the right upper lobe. This may represent an endobronchial lesion or bronchial obstruction benign or malignant. May consider correlation with PET/CT evaluation and or tissue Biopsy.  - 1.9 cm low density lesion in the right liver lobe near the dome which most likely represents a cyst  - There are posterior osteophytes at C5-6      ASSESSMENT/PLAN:  Neurological: bilateral L5 TP fractures, bilateral sacral ala fractures, possible buckle fracture involving S3 vertebral body  - NSGY eval and rec's for non-operative management, activity as tolerated, avoid aggressive movements. Pelvic trusses/braces could be used but would increase risk of pressure sores. - Continue scheduled Tylenol 650mg q6hrs  - Continue Tramadol 25mg prn for moderate/severe pain  - Continue home Donepezil 5mg daily  - Continue home Seroquel 12.5mg daily     Cardiovascular: No acute events. - No indication for telemetry monitoring     Respiratory: No acute respiratory issues. Incidental findings as above to be discussed with POA. - Maintain O2 sats > 92%  - Encourage IS hourly     GI/Diet: No acute issues. - Continue regular diet  - Continue home Floranex  - Continue bowel regimen with senokot and prn      Renal/Electrolytes: No acute issues.  - HLIV  - BMP as needed     ID:   No active infection. Remains afebrile, normotensive, and without leukocytosis. - No indication for Abx     Heme: No acute issues  - No indication for transfusion. Endocrine: No acute issues  - No acute issues     MSK: Multiple falls at assisted living facility.  bilateral L5 TP fractures, bilateral sacral ala fractures, possible buckle fracture involving S3 vertebral body. All fractures non-operative per NSGY. - Spines: Cleared  - Weight Bearing Restrictions: as toelrated  - Activity: as tolerated. No restrictions  - PT/OT: eval and dispo rec's as able    Prophylaxis:   - SCDs and Lovenox 30mg BID for VTE PPx    Lines/Tubes/Drains:  - Maintain PIVs  - No indication for harris catheter, monitor for urinary retention    Dispo: Remain in on trauma service for dispo planning. She is medically cleared for discharge. Accom Status: General Status      - Follow up with PCP for routine post-hospitalization care. - No trauma follow up needed. Please call for any questions or concerns.     Stephen Duvall, [address] [facility].  [phone] ([phone]) [phone]     This patient's plan of care was discussed and made in collaboration with Trauma Attending physician, Aleksandr Montero MD.

## 2022-01-24 NOTE — PROGRESS NOTES
Physical Therapy Med Surg Daily Treatment Note  Facility/Department: [address] NEURO  Room: N2/N227-       NAME: Anastasia Harden  : 1940 (81 y.o.)  MRN: [id number]  CODE STATUS: Full Code    Date of Service: 2022    Patient Diagnosis(es): Unable to ambulate [R26.2]  Closed fracture of sacrum, initial encounter ([facility] [S32.10XA]  Lumbar transverse process fracture, closed, initial encounter ([facility] [S32.009A]  History of dementia [Z86.59]  Closed fracture of sacrum, unspecified portion of sacrum, initial encounter [facility]) [S32.10XA]   Chief Complaint   Patient presents with    Hip Pain     Patient Active Problem List    Diagnosis Date Noted    Closed fracture of sacrum, initial encounter ([facility] 2022    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, vertebra(e), init ([facility] 2022        Past Medical History:   Diagnosis Date    Asymptomatic varicose veins of both lower extremities     Dementia with behavioral disturbance ([facility]     Elevated blood pressure reading     Full dentures     History of hip fracture     Small vessel disease, cerebrovascular      Past Surgical History:   Procedure Laterality Date    HIP FRACTURE SURGERY            Restrictions  Restrictions/Precautions: Fall Risk (high fall risk per Azar Smith)    SUBJECTIVE   Subjective  Subjective: Pt alert to self. Niece present for treatment. Pt agreeable to participate    Pre-Session Pain Report  Pre Treatment Pain Screening  Pain at present: 0  Intervention List: Patient able to continue with treatment          Post-Session Pain Report  Pain Assessment  Pain Level: 0         OBJECTIVE   Orientation  Overall Orientation Status: Impaired  Orientation Level: Oriented to person    Bed mobility  Comment: NT - Up in chair before and after tx.     Transfers  Sit to Stand: Contact guard assistance;Stand by assistance  Stand to sit: Contact guard assistance;Stand by assistance  Bed to Chair: Contact guard assistance;Stand by assistance  Comment: constant cues for step by step sequencing and safety. good follow through    Ambulation  Ambulation?: Yes  Ambulation 1  Surface: level tile  Device: Rolling Walker  Assistance: Contact guard assistance  Quality of Gait: Flexed posture, difficulty manuevering [employer] especially with directional changes  Gait Deviations: Slow Soraida;Decreased step length;Decreased step height  Distance: 25'x2  Comments: Constant cues through ambulation for direction and orientation. Ambulated with and without device. Improved stability with device,              Neuromuscular Education  Neuromuscular Comments: Functional reaching, rotation, trunk bending - Pt made bed and washed face at sink. Activity Tolerance  Activity Tolerance: Patient limited by cognitive status          ASSESSMENT   Assessment: Pt ambulated with and without WW. slow antalgic pattern without device and improved pattern with device. However more cues for [employer] and safety with [employer]. Discharge Recommendations:  Continue to assess pending progress,Patient would benefit from continued therapy after discharge    Goals  Long term goals  Long term goal 1: Bed mobility with SBA  Long term goal 2: Functional transfers with SBA  Long term goal 3: Amb 50ft with LRAD and SBA  Long term goal 4: SBA for HEP to improve LE strength and activity tolerance  Patient Goals   Patient goals : unable to state, family member at bedside wishes pt be able to return to walking and being able to return to assisted living    PLAN    Safety Devices  Type of devices: All fall risk precautions in place;Call light within reach; Chair alarm in place; Left in chair     AMPAC (6 CLICK) BASIC MOBILITY  AM-PAC Inpatient Mobility Raw Score : 17      Therapy Time   Individual   Time In 0915   Time Out 0939   Minutes 24      Gait - 15 mins  NMR - 9 mins       Beatrice Tipton PTA, 01/24/22 at 9:46 AM       Definitions for assistance levels  Independent = pt does not require any physical supervision or assistance from another person for activity completion. Device may be needed.   Stand by assistance = pt requires verbal cues or instructions from another person, close to but not touching, to perform the activity  Minimal assistance= pt performs 75% or more of the activity; assistance is required to complete the activity  Moderate assistance= pt performs 50% of the activity; assistance is required to complete the activity  Maximal assistance = pt performs 25% of the activity; assistance is required to complete the activity  Dependent = pt requires total physical assistance to accomplish the task

## 2022-01-24 NOTE — DISCHARGE SUMMARY
[address]  [address]  [facility], [address]  MRN: [id number]  YOB: 1940  80 y. o.female      Attending  Guillermo Mcnair MD ?   Date of Admission  1/21/2022 Date of Discharge  1/24/2022      ? 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   Qty: 14 tablet, Refills: 0    Comments: Reduce doses taken as pain becomes manageable  Associated Diagnoses: Lumbar transverse process fracture, closed, initial encounter ([facility]      acetaminophen (TYLENOL) 325 MG tablet Take 2 tablets by mouth every 4 hours as needed for Pain  Qty: 120 tablet, Refills: 0              Details   Lactobacillus (ACIDOPHILUS) CAPS capsule Take 1 capsule by mouth 2 times daily      vitamin D3 (CHOLECALCIFEROL) 25 MCG (1000 UT) TABS tablet Take 1,000 Units by mouth daily      ketoconazole (NIZORAL) 2 % cream Apply topically 2 times daily Apply topically daily L foot      donepezil (ARICEPT) 5 MG tablet Take 5 mg by mouth daily       QUEtiapine (SEROQUEL) 25 MG tablet Take 0.5 tablets by mouth daily  Qty: 30 tablet, Refills: 0             Current Facility-Administered Medications:     sodium chloride flush 0.9 % injection 5-40 mL, 5-40 mL, IntraVENous, 2 times per day, Shruthi Krishnamurthy MD, 10 mL at 01/24/22 0918    sodium chloride flush 0.9 % injection 5-40 mL, 5-40 mL, IntraVENous, PRN, Shruthi Krishnamurthy MD    0.9 % sodium chloride infusion, 25 mL, IntraVENous, PRN, Shruthi Krishnamurthy MD    ondansetron (ZOFRAN-ODT) disintegrating tablet 4 mg, 4 mg, Oral, Q8H PRN **OR** ondansetron (ZOFRAN) injection 4 mg, 4 mg, IntraVENous, Q6H PRN, Mamadou Ordoñez MD    donepezil (ARICEPT) tablet 5 mg, 5 mg, Oral, Daily, CASSIUS Mccullough, 5 mg at 01/24/22 8073    lactobacillus acidophilus [facility]) 1 tablet, 1 tablet, Oral, BID, CASSIUS Mccullough, 1 tablet at 01/24/22 4362    QUEtiapine (SEROQUEL) tablet 12.5 mg, 12.5 mg, Oral, Daily, CASSIUS Mccullough, 12.5 mg at 01/24/22 3711    Vitamin D (CHOLECALCIFEROL) tablet 1,000 Units, 1,000 Units, Oral, Daily, CASSIUS Mccullough, 1,000 Units at 01/24/22 8016    ketoconazole (NIZORAL) 2 % cream, , Topical, BID, CASSIUS Mccullough, Given at 01/23/22 2155    acetaminophen (TYLENOL) tablet 650 mg, 650 mg, Oral, Q6H, CASSIUS Mccullough, 650 mg at 01/24/22 0925    traMADol (ULTRAM) tablet 25 mg, 25 mg, Oral, Q6H PRN, CASSIUS Mccullough, 25 mg at 01/22/22 2143    sennosides-docusate sodium (SENOKOT-S) 8.6-50 MG tablet 2 tablet, 2 tablet, Oral, BID, CASSIUS Mccullough, 2 tablet at 01/24/22 4369    polyethylene glycol (GLYCOLAX) packet 17 g, 17 g, Oral, Daily PRN, CASSIUS Mccullough    enoxaparin (LOVENOX) injection 30 mg, 30 mg, SubCUTAneous, BID, CASSIUS Mccullough, 30 mg at 01/24/22 1037      REASON FOR HOSPITALIZATION:  Maru Cardenas is a 80 y.o. female with a PMHx of dementia, HTN, CVA event, degenerative joint disease who presented to [facility] ED on 1/21/2022 s/p presumed unwitnessed fall 1-2 days prior at [employer]. Brought to ED due to concern for right hip pain and difficulty with ambulation.      ED work up significant for bilateral L5 TP fractures, bilateral sacral ala fractures, possible buckle fracture involving S3 vertebral body. Patient admitted to Trauma Surgery RNF with NSGY consult. Palliative care consult placed for code status change. Currently full code. SIGNIFICANT FINDINGS:  Catalog of Injuries:   1.  S/p multiple unwitnessed falls  2. bilateral L5 TP fractures  3. bilateral sacral ala fractures  4. possible buckle fracture involving S3 vertebral body  5. Acute pain due to trauma     PMHx: dementia, HTN, CVA event, degenerative joint disease     Incidental Findings: Discussed at bedside with patient and POA. - branching nodule or mass in the posterior portion of the right upper lobe. This may represent an endobronchial lesion or bronchial obstruction benign or malignant. May consider correlation with PET/CT evaluation and or tissue Biopsy. - 1.9 cm low density lesion in the right liver lobe near the dome which most likely represents a cyst  - There are posterior osteophytes at C5-6       HOSPITAL COURSE:  1/21/2022: Right hip pain and difficulty ambulating. Spine fractures as detailed above. Additional imaging negative. Admitted to Trauma RNF. NSGY consulted, non-op. Diet advanced. Home meds reconciled with [place]. 1/22/2022: medically cleared for discharge. 1/23/2022: PT/OT eval and dispo recs for SNF.  1/24/2022: No change in care plan. Incidental findings discussed. Patient is medically cleared to return to her established [facility]. At time of discharge, Michaela Kim was tolerating a regular diet, having bowel movements, and had adequate analgesia on oral pain medications. Pt's activity level was as tolerated. The patient had no signs or symptoms of complications. The patient was seen and examined on the day of discharge with the following findings:  Constitutional: Sitting upright in bedside chair. POA at bedside. HEENT: Atraumatic normocephalic. Cardiovascular: Regular rate and rhythm. Pulmonary: Clear to ausculation bilaterally on room air. No wheezing, rhonchi or rales. Abdominal: Soft. Non-distended. Non-tender. Musculoskeletal: Good ROM in all extremities. No edema. Neurological: Alert, awake, and orientated x 0-1 (baseline). Motor and sensory grossly intact. No focal deficits. GCS of 15. ANTICIPATED FOLLOW UP:  No future appointments. Discharge Procedure Orders   External Referral to Physical Therapy   Referral Priority: Routine Referral Type: Eval and Treat   Referral Reason: Specialty Services Required   Requested Specialty: Physical Therapy   Number of Visits Requested: 1     External Referral to Occupational Therapy   Referral Priority: Routine Referral Type: Eval and Treat   Referral Reason: Specialty Services Required   Requested Specialty: Occupational Therapy   Number of Visits Requested: 1       Other indicated follow up and instructions for scheduling:  - Follow up with PCP for routine post-hospitalization care and incidental findings.  - No trauma follow up needed. VTE RISK AT DISCHARGE:  Per trauma program protocol, the patient does not require post-discharge VTE prophylaxis due to: NWB single LE or BLE fractures limiting mobility.     --  Lupe Burnham, [address] [facility]  Emergency General Surgery  [phone] ([phone])  [phone]

## 2022-01-31 ENCOUNTER — OFFICE VISIT (OUTPATIENT)
Dept: GERIATRIC MEDICINE | Age: 82
End: 2022-01-31
Payer: MEDICARE

## 2022-01-31 DIAGNOSIS — F02.80 LATE ONSET ALZHEIMER'S DEMENTIA WITHOUT BEHAVIORAL DISTURBANCE (HCC): ICD-10-CM

## 2022-01-31 DIAGNOSIS — G30.1 LATE ONSET ALZHEIMER'S DEMENTIA WITHOUT BEHAVIORAL DISTURBANCE (HCC): ICD-10-CM

## 2022-01-31 DIAGNOSIS — R19.5 LOOSE STOOLS: Primary | ICD-10-CM

## 2022-02-02 ENCOUNTER — TELEPHONE (OUTPATIENT)
Dept: PALLATIVE CARE | Age: 82
End: 2022-02-02

## 2022-02-02 NOTE — LETTER
Our office has attempted to reach you by phone three times in order to set up a new patient appointment. Please contact our office at your earliest convenience to set up your visit. If you have any questions or concerns, please don't hesitate to call.     Sincerely,        Miguelina NO

## 2022-02-21 PROBLEM — F02.80 LATE ONSET ALZHEIMER'S DEMENTIA WITHOUT BEHAVIORAL DISTURBANCE (HCC): Status: ACTIVE | Noted: 2022-02-21

## 2022-02-21 PROBLEM — G30.1 LATE ONSET ALZHEIMER'S DEMENTIA WITHOUT BEHAVIORAL DISTURBANCE (HCC): Status: ACTIVE | Noted: 2022-02-21

## 2022-02-21 PROBLEM — Z86.16 HISTORY OF COVID-19: Status: ACTIVE | Noted: 2022-02-21

## 2022-02-22 NOTE — PROGRESS NOTES
Subjective:      Patient ID: Yony Collins is a pleasant 80 y.o. female who presents today for:  No chief complaint on file. Patient seen today in her room on the dementia unit for COVID-19 follow-up. Patient secluded due to quarantine for COVID-19. Seen today with significantly elevated D-dimer of 356 0 ng/mL. Patient not having any acute chest pain, shortness of breath, POI, wheezing. Patient having difficult ROS due to dementia, thus poor determination of symptoms however patient appears to be very well maintained and asymptomatic. Patient will be started on anticoagulant today (see chart). Patient is on standard Covid protocol per [facility] geriatrics orders. She denies any acute issue otherwise. Is pleasant cooperative, laughing and overall having a good time during visit. We will continue monitoring while on Covid unit. We will frequently monitor D-dimer while on anticoagulation until within normal limits. Watch for vital sign changes daily, and or changes to cardiorespiratory symptoms. 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     Dementia with behavioral disturbance ([address]     Elevated blood pressure reading     Full dentures     History of hip fracture 2012    Small vessel disease, cerebrovascular      Past Surgical History:   Procedure Laterality Date    HIP FRACTURE SURGERY  2012     Social History     Socioeconomic History    Marital status:       Spouse name: Not on file    Number of children: Not on file    Years of education: Not on file    Highest education level: Not on file   Occupational History    Not on file   Tobacco Use    Smoking status: Unknown If Ever Smoked    Smokeless tobacco: Never Used   Substance and Sexual Activity    Alcohol use: Not on file    Drug use: Not on file    Sexual activity: Not on file   Other Topics Concern    Not on file   Social History Narrative    No children    States she has worked in [place] in [address] dementia with behavior problems in 2020    Resides at [place] [address] Strain:     Difficulty of Paying Living Expenses: Not on file   Food Insecurity:     Worried About [address] in the Last Year: Not on file    [address] in the Last Year: Not on file   Transportation Needs:     Lack of Transportation (Medical): Not on file    Lack of Transportation (Non-Medical): Not on file   Physical Activity:     Days of Exercise per Week: Not on file    Minutes of Exercise per Session: Not on file   Stress:     Feeling of Stress : Not on file   Social Connections:     Frequency of Communication with Friends and Family: Not on file    Frequency of Social Gatherings with Friends and Family: Not on file    Attends Congregational Services: Not on file    Active Member of [address] [place] [address] or Organizations: Not on file    Attends Club or Organization Meetings: Not on file    Marital Status: Not on file   Intimate Partner Violence:     Fear of Current or Ex-Partner: Not on file    Emotionally Abused: Not on file    Physically Abused: Not on file    Sexually Abused: Not on file   Housing Stability:     Unable to Pay for Housing in the Last Year: Not on file    Number of [place] in the Last Year: Not on file    Unstable Housing in the Last Year: Not on file     Family History   Family history unknown:  Yes     Allergies   Allergen Reactions    Bactrim [Sulfamethoxazole-Trimethoprim]     Bee Venom     Ceftin [Cefuroxime]     Wasp Venom          Review of Systems   Unable to perform ROS: Dementia         Objective: There were no vitals taken for this visit. Physical Exam  Vitals and nursing note reviewed. Constitutional:       General: She is not in acute distress. Appearance: Normal appearance. She is normal weight. She is not ill-appearing. HENT:      Head: Normocephalic and atraumatic. Mouth/Throat:      Mouth: Mucous membranes are moist.      Pharynx: Oropharynx is clear. Eyes:      Extraocular Movements: Extraocular movements intact. Conjunctiva/sclera: Conjunctivae normal.      Pupils: Pupils are equal, round, and reactive to light. Cardiovascular:      Rate and Rhythm: Normal rate and regular rhythm. Pulses: Normal pulses. Heart sounds: Normal heart sounds. Pulmonary:      Effort: Pulmonary effort is normal. No respiratory distress. Breath sounds: Normal breath sounds. No wheezing. Abdominal:      General: Abdomen is flat. Bowel sounds are normal.      Palpations: Abdomen is soft. Musculoskeletal:         General: Normal range of motion. Cervical back: Neck supple. Skin:     General: Skin is warm and dry. Coloration: Skin is not jaundiced or pale. Neurological:      General: No focal deficit present. Mental Status: She is alert. Mental status is at baseline. She is disoriented. Motor: Weakness present. Gait: Gait normal.   Psychiatric:         Mood and Affect: Mood normal.      Comments: A&o x1           Assessment:       Diagnosis Orders   1. History of COVID-19     2. Late onset Alzheimer's dementia without behavioral disturbance ([facility]           Plan:      No orders of the defined types were placed in this encounter. No orders of the defined types were placed in this encounter. Continue current Covid protocol. We will add is either Lovenox or Eliquis depending on patient tolerance. Monitor D-dimers as needed within normal limits. Monitor vital signs daily. Alert for any changes and cardiorespiratory status. .     No follow-ups on file.      Side effects, adverse effects of the medication prescribed today, as well as treatment plan and result expectations have beendiscussed with the patient who expresses understanding and desires to proceed.     Jana Jones

## 2022-02-23 NOTE — PROGRESS NOTES
Subjective:      Patient ID: Marcy Otero is a pleasant 80 y.o. female who presents today for:  No chief complaint on file. Following up with patient today for recent positive Covid infection. Patient currently on memory care unit with quarantine is being held. Patient on COVID-19 protocol per [id number] [facility] standard protocol, with routine labs q. weekly. Patient has not had any new symptom changes. No sequelae. Overall nursing staff report no new changes compared to patient's prior baseline. Patient continues to be confused per prior diagnosis of dementia, no new ARDS or ACS related complaints or concerns. Patient respirating well. Vital signs stable. See chart for details. No new today. Monitoring closely. Patient Active Problem List   Diagnosis    Closed fracture of sacrum, initial encounter ([address]    Lumbar transverse process fracture, closed, initial encounter ([address]    History of COVID-19    Late onset Alzheimer's dementia without behavioral disturbance ([address]     Past Medical History:   Diagnosis Date    Asymptomatic varicose veins of both lower extremities     Dementia with behavioral disturbance ([address]     Elevated blood pressure reading     Full dentures     History of hip fracture 2012    Small vessel disease, cerebrovascular      Past Surgical History:   Procedure Laterality Date    HIP FRACTURE SURGERY  2012     Social History     Socioeconomic History    Marital status:       Spouse name: Not on file    Number of children: Not on file    Years of education: Not on file    Highest education level: Not on file   Occupational History    Not on file   Tobacco Use    Smoking status: Unknown If Ever Smoked    Smokeless tobacco: Never Used   Substance and Sexual Activity    Alcohol use: Not on file    Drug use: Not on file    Sexual activity: Not on file   Other Topics Concern    Not on file   Social History Narrative    No children    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[place]    Developed dementia with behavior problems in 2020    Resides at [facility] living [facility]     Social Determinants of Health     Financial Resource Strain:     Difficulty of Paying Living Expenses: Not on file   Food Insecurity:     Worried About Running Out of Food in the Last Year: Not on file    Melissa of Food in the Last Year: Not on file   Transportation Needs:     Lack of Transportation (Medical): Not on file    Lack of Transportation (Non-Medical): Not on file   Physical Activity:     Days of Exercise per Week: Not on file    Minutes of Exercise per Session: Not on file   Stress:     Feeling of Stress : Not on file   Social Connections:     Frequency of Communication with Friends and Family: Not on file    Frequency of Social Gatherings with Friends and Family: Not on file    Attends Hinduism Services: Not on file    Active Member of [address] [place] [address] [employer] or Organizations: Not on file    Attends Club or Organization Meetings: Not on file    Marital Status: Not on file   Intimate Partner Violence:     Fear of Current or Ex-Partner: Not on file    Emotionally Abused: Not on file    Physically Abused: Not on file    Sexually Abused: Not on file   Housing Stability:     Unable to Pay for Housing in the Last Year: Not on file    Number of [place] in the Last Year: Not on file    Unstable Housing in the Last Year: Not on file     Family History   Family history unknown: Yes     Allergies   Allergen Reactions    Bactrim [Sulfamethoxazole-Trimethoprim]     Bee Venom     Ceftin [Cefuroxime]     Wasp Venom          Review of Systems   Unable to perform ROS: Dementia       Objective: There were no vitals taken for this visit. Physical Exam  Vitals and nursing note reviewed. Constitutional:       General: She is not in acute distress. Appearance: Normal appearance. She is normal weight. She is not ill-appearing. HENT:      Head: Normocephalic and atraumatic. Mouth/Throat:      Mouth: Mucous membranes are moist.      Pharynx: Oropharynx is clear. Eyes:      Pupils: Pupils are equal, round, and reactive to light. Cardiovascular:      Rate and Rhythm: Normal rate and regular rhythm. Pulses: Normal pulses. Heart sounds: Normal heart sounds. Pulmonary:      Effort: Pulmonary effort is normal. No respiratory distress. Breath sounds: Normal breath sounds. No wheezing. Abdominal:      General: Abdomen is flat. Bowel sounds are normal.      Palpations: Abdomen is soft. Musculoskeletal:         General: Normal range of motion. Cervical back: Neck supple. Skin:     General: Skin is warm and dry. Coloration: Skin is not jaundiced or pale. Neurological:      General: No focal deficit present. Mental Status: She is alert. Mental status is at baseline. She is disoriented. Motor: Weakness present. Gait: Gait normal.   Psychiatric:         Mood and Affect: Mood normal.      Comments: A&o x1         Assessment:       Diagnosis Orders   1. Late onset Alzheimer's dementia without behavioral disturbance ([facility]     2. History of COVID-19           Plan:      No orders of the defined types were placed in this encounter. No orders of the defined types were placed in this encounter. 1.  Continue Covid protocol. Follow up on labs as ordered. Vital signs as ordered. No new changes otherwise    No follow-ups on file. Side effects, adverse effects of the medication prescribed today, as well as treatment plan and result expectations have been discussed withthe patient who expresses understanding and desires to proceed.     Brandt [place], [place]

## 2022-02-24 NOTE — PROGRESS NOTES
Subjective:      Patient ID: Suzan Elizabeth is a pleasant 80 y.o. female who presents today for:  No chief complaint on file. Patient being seen today for complaint of bilateral feet being swollen. Patient currently not on any regular diuretic therapy. Eating and drinking well. Good overall fluid intake. No fluid restriction. Does wear stockings for dependent edema. Nursing today that stockings are rolled on her feet/ankles and causing fluid back up in her feet. Encourage use of the stockings. We will add Lasix 10 mg p.o. every other day routinely. Monitor for any increased bilateral lower leg edema. No further issues at this time. Patient does have significant dementia, questionable whether or not she understands directions. We will have nursing staff follow-up      Patient Active Problem List   Diagnosis    Closed fracture of sacrum, initial encounter ([facility]    Lumbar transverse process fracture, closed, initial encounter ([facility] [facility]    History of COVID-19    Late onset Alzheimer's dementia without behavioral disturbance ([facility] [address] [facility]     Past Medical History:   Diagnosis Date    Asymptomatic varicose veins of both lower extremities     Dementia with behavioral disturbance ([address]     Elevated blood pressure reading     Full dentures     History of hip fracture 2012    Small vessel disease, cerebrovascular      Past Surgical History:   Procedure Laterality Date    HIP FRACTURE SURGERY  2012     Social History     Socioeconomic History    Marital status:       Spouse name: Not on file    Number of children: Not on file    Years of education: Not on file    Highest education level: Not on file   Occupational History    Not on file   Tobacco Use    Smoking status: Unknown If Ever Smoked    Smokeless tobacco: Never Used   Substance and Sexual Activity    Alcohol use: Not on file    Drug use: Not on file    Sexual activity: Not on file   Other Topics Concern    Not on file   Social History Narrative    No children    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    Resides at [facility] living [facility]     Social Determinants of Health     Financial Resource Strain:     Difficulty of Paying Living Expenses: Not on file   Food Insecurity:     Worried About [address] in the Last Year: Not on file    [address] in the Last Year: Not on file   Transportation Needs:     Lack of Transportation (Medical): Not on file    Lack of Transportation (Non-Medical): Not on file   Physical Activity:     Days of Exercise per Week: Not on file    Minutes of Exercise per Session: Not on file   Stress:     Feeling of Stress : Not on file   Social Connections:     Frequency of Communication with Friends and Family: Not on file    Frequency of Social Gatherings with Friends and Family: Not on file    Attends Druze Services: Not on file    Active Member of [address] [place] [address] or Organizations: Not on file    Attends Club or Organization Meetings: Not on file    Marital Status: Not on file   Intimate Partner Violence:     Fear of Current or Ex-Partner: Not on file    Emotionally Abused: Not on file    Physically Abused: Not on file    Sexually Abused: Not on file   Housing Stability:     Unable to Pay for Housing in the Last Year: Not on file    Number of [place] in the Last Year: Not on file    Unstable Housing in the Last Year: Not on file     Family History   Family history unknown: Yes     Allergies   Allergen Reactions    Bactrim [Sulfamethoxazole-Trimethoprim]     Bee Venom     Ceftin [Cefuroxime]     Wasp Venom          Review of Systems   Unable to perform ROS: Dementia       Objective: There were no vitals taken for this visit. Physical Exam  Vitals reviewed. Constitutional:       Appearance: She is normal weight. HENT:      Head: Normocephalic and atraumatic.       Mouth/Throat:      Mouth: Mucous membranes are moist.      Pharynx: Oropharynx is clear. Eyes:      Extraocular Movements: Extraocular movements intact. Conjunctiva/sclera: Conjunctivae normal.      Pupils: Pupils are equal, round, and reactive to light. Cardiovascular:      Rate and Rhythm: Normal rate and regular rhythm. Pulses: Normal pulses. Heart sounds: Normal heart sounds. Pulmonary:      Effort: Pulmonary effort is normal.      Breath sounds: Normal breath sounds. Musculoskeletal:         General: Swelling present. Right lower leg: Edema (minor pitting) present. Left lower leg: Edema (minor pitting) present. Skin:     General: Skin is warm and dry. Findings: No erythema. Neurological:      General: No focal deficit present. Mental Status: She is alert and oriented to person, place, and time. Psychiatric:         Mood and Affect: Mood normal.         Assessment:       Diagnosis Orders   1. Bilateral edema of lower extremity     2. Edema of both feet           Plan:      No orders of the defined types were placed in this encounter. No orders of the defined types were placed in this encounter. Add Lasix 10 mg p.o. every other day. Continue utilizing stockings, however utilize nursing staff to assist with placement. Monitor for efficacy. No follow-ups on file. Side effects, adverse effects of the medication prescribed today, as well as treatment plan and result expectations have been discussed withthe patient who expresses understanding and desires to proceed.     Jana Dumont

## 2022-02-25 ASSESSMENT — ENCOUNTER SYMPTOMS
WHEEZING: 0
CONSTIPATION: 0
COLOR CHANGE: 0
ABDOMINAL PAIN: 0
VOMITING: 0
ABDOMINAL DISTENTION: 0
SHORTNESS OF BREATH: 0
DIARRHEA: 0
CHEST TIGHTNESS: 0
NAUSEA: 0

## 2022-02-26 NOTE — PROGRESS NOTES
Subjective:      Patient ID: Jesse Colon is a pleasant 80 y.o. female who presents today for:  No chief complaint on file. Patient seen today for complaints of loose stools. Patient increasingly worsening dementia. Very poor ROS. Nursing staff reporting symptoms. Patient has been having issues where she is actively found with stool on her hands. ? Patient may be more suitable for Memory care unit. Will add Immodium for time being. Patient Active Problem List   Diagnosis    Closed fracture of sacrum, initial encounter ([address]    Lumbar transverse process fracture, closed, initial encounter ([address]    History of COVID-19    Late onset Alzheimer's dementia without behavioral disturbance ([address]     Past Medical History:   Diagnosis Date    Asymptomatic varicose veins of both lower extremities     Dementia with behavioral disturbance ([address]     Elevated blood pressure reading     Full dentures     History of hip fracture 2012    Small vessel disease, cerebrovascular      Past Surgical History:   Procedure Laterality Date    HIP FRACTURE SURGERY  2012     Social History     Socioeconomic History    Marital status:       Spouse name: Not on file    Number of children: Not on file    Years of education: Not on file    Highest education level: Not on file   Occupational History    Not on file   Tobacco Use    Smoking status: Unknown If Ever Smoked    Smokeless tobacco: Never Used   Substance and Sexual Activity    Alcohol use: Not on file    Drug use: Not on file    Sexual activity: Not on file   Other Topics Concern    Not on file   Social History Narrative    No children    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 Negative for dizziness, syncope and headaches. Psychiatric/Behavioral: Negative for agitation, behavioral problems, confusion and dysphoric mood. The patient is not nervous/anxious. All other systems reviewed and are negative. Objective:   SEE MONTHLY VS CHART     Physical Exam  Constitutional:       General: She is not in acute distress. Appearance: She is not ill-appearing. HENT:      Head: Normocephalic and atraumatic. Mouth/Throat:      Mouth: Mucous membranes are moist.      Pharynx: Oropharynx is clear. Eyes:      Extraocular Movements: Extraocular movements intact. Pupils: Pupils are equal, round, and reactive to light. Cardiovascular:      Rate and Rhythm: Normal rate and regular rhythm. Pulmonary:      Effort: Pulmonary effort is normal. No respiratory distress. Breath sounds: Normal breath sounds. Abdominal:      General: Bowel sounds are normal. There is no distension. Palpations: There is no mass. Tenderness: There is no abdominal tenderness. There is no guarding or rebound. Genitourinary:     Comments: DEFERRED  Skin:     General: Skin is warm. Neurological:      General: No focal deficit present. Mental Status: She is alert. She is disoriented. Motor: Weakness present. Comments: A&O x 1   Psychiatric:         Mood and Affect: Mood normal.         Assessment:       Diagnosis Orders   1. Loose stools     2. Late onset Alzheimer's dementia without behavioral disturbance ([facility] [facility]           Plan:      No orders of the defined types were placed in this encounter. No orders of the defined types were placed in this encounter. Begin Imodium AD 1 tab p.o. daily x1/2 tab for each additional loose stool no greater than 2 tabs daily. If further loose stool will monitor BMP to significant degenerative type fluid is due now.  Consider discussing patient move to Memory care unit for increase monitoring. No follow-ups on file. Side effects, adverse effects of the medication prescribed today, as well as treatment plan and result expectations have been discussed withthe patient who expresses understanding and desires to proceed.     Jana Pride

## 2022-03-02 ENCOUNTER — OFFICE VISIT (OUTPATIENT)
Dept: GERIATRIC MEDICINE | Age: 82
End: 2022-03-02
Payer: MEDICARE

## 2022-03-02 DIAGNOSIS — F02.80 LATE ONSET ALZHEIMER'S DEMENTIA WITHOUT BEHAVIORAL DISTURBANCE (HCC): Primary | ICD-10-CM

## 2022-03-02 DIAGNOSIS — G30.1 LATE ONSET ALZHEIMER'S DEMENTIA WITHOUT BEHAVIORAL DISTURBANCE (HCC): Primary | ICD-10-CM

## 2022-03-02 DIAGNOSIS — Z86.16 HISTORY OF COVID-19: ICD-10-CM

## 2022-03-02 DIAGNOSIS — S32.10XA CLOSED FRACTURE OF SACRUM, INITIAL ENCOUNTER (HCC): ICD-10-CM

## 2022-03-02 DIAGNOSIS — S32.009A LUMBAR TRANSVERSE PROCESS FRACTURE, CLOSED, INITIAL ENCOUNTER (HCC): ICD-10-CM

## 2022-03-10 NOTE — PROGRESS NOTES
Subjective:      Patient ID: Kyle Salcido is a pleasant 80 y.o. female who presents today for:  No chief complaint on file. [address]    Patient seen today for annual visit for assisted living. See medical history below. Patient is fairly new to facility. She was in regular population with other clients how, however upon few weeks since arrival it is very apparent that her Alzheimer's has progressed to a level where she would be unsafe and general living conditions. Patient's capital SLU MS score was approximately 2 out of 30. Patient has extremely poor recall. Spoke with family and we will be having patient moved to memory care unit where she will receive more attention and care 24/7. She is otherwise unchanged compared to prior visit. She is not complaining of any pain or new onset symptoms. She is eating/drinkning well and maintaining weight well. She is mobile and has not had any recent falls Up to date on immunizations, but will check to ensure she/family is being offered pneumovx and shingles vaccines. No additional concerns per nursing staff today. Patient Active Problem List   Diagnosis    Closed fracture of sacrum, initial encounter ([address]    Lumbar transverse process fracture, closed, initial encounter ([address]    History of COVID-19    Late onset Alzheimer's dementia without behavioral disturbance ([address]     Past Medical History:   Diagnosis Date    Asymptomatic varicose veins of both lower extremities     Dementia with behavioral disturbance ([address]     Elevated blood pressure reading     Full dentures     History of hip fracture 2012    Small vessel disease, cerebrovascular      Past Surgical History:   Procedure Laterality Date    HIP FRACTURE SURGERY  2012     Social History     Socioeconomic History    Marital status:       Spouse name: Not on file    Number of children: Not on file    Years of education: Not on file    Highest education level: Not on file Occupational History    Not on file   Tobacco Use    Smoking status: Unknown If Ever Smoked    Smokeless tobacco: Never Used   Substance and Sexual Activity    Alcohol use: Not on file    Drug use: Not on file    Sexual activity: Not on file   Other Topics Concern    Not on file   Social History Narrative    No children    States she has worked in [employer] in [address] dementia with behavior problems in 2020    Resides at [place] [address] Strain:     Difficulty of Paying Living Expenses: Not on file   Food Insecurity:     Worried About [address] in the Last Year: Not on file    [address] in the Last Year: Not on file   Transportation Needs:     Lack of Transportation (Medical): Not on file    Lack of Transportation (Non-Medical): Not on file   Physical Activity:     Days of Exercise per Week: Not on file    Minutes of Exercise per Session: Not on file   Stress:     Feeling of Stress : Not on file   Social Connections:     Frequency of Communication with Friends and Family: Not on file    Frequency of Social Gatherings with Friends and Family: Not on file    Attends Worship Services: Not on file    Active Member of [address] [place] [address] or Organizations: Not on file    Attends Club or Organization Meetings: Not on file    Marital Status: Not on file   Intimate Partner Violence:     Fear of Current or Ex-Partner: Not on file    Emotionally Abused: Not on file    Physically Abused: Not on file    Sexually Abused: Not on file   Housing Stability:     Unable to Pay for Housing in the Last Year: Not on file    Number of [place] in the Last Year: Not on file    Unstable Housing in the Last Year: Not on file     Family History   Family history unknown:  Yes     Allergies   Allergen Reactions    Bactrim [Sulfamethoxazole-Trimethoprim]     Bee Venom     Ceftin [Cefuroxime]     Wasp Venom Review of Systems   Unable to perform ROS: Dementia       Objective: There were no vitals taken for this visit. Physical Exam  Constitutional:       General: She is not in acute distress. Appearance: Normal appearance. She is not ill-appearing. HENT:      Head: Normocephalic and atraumatic. Mouth/Throat:      Mouth: Mucous membranes are moist.      Pharynx: Oropharynx is clear. Eyes:      Pupils: Pupils are equal, round, and reactive to light. Cardiovascular:      Rate and Rhythm: Normal rate and regular rhythm. Pulmonary:      Effort: Pulmonary effort is normal. No respiratory distress. Breath sounds: Normal breath sounds. Abdominal:      General: Bowel sounds are normal. There is no distension. Palpations: There is no mass. Tenderness: There is no abdominal tenderness. There is no guarding or rebound. Genitourinary:     Comments: DEFERRED  Musculoskeletal:         General: Normal range of motion. Cervical back: Normal range of motion and neck supple. Skin:     General: Skin is warm. Neurological:      General: No focal deficit present. Mental Status: She is alert. She is disoriented. Motor: Weakness present. Psychiatric:         Mood and Affect: Mood normal.      Comments: A&O x 1  [facility] 2/30         Assessment:       Diagnosis Orders   1. Late onset Alzheimer's dementia without behavioral disturbance ([address]     2. Lumbar transverse process fracture, closed, initial encounter ([address]     3. Closed fracture of sacrum, initial encounter ([facility]     4. History of COVID-19           Plan:          Patient to be moved to dementia unit long-term where she will receive increased monitoring and care. No new evidence of syncope or falls. Weight has been stable, patient eating and drinking well. We will continue with current care parameters. Will attempt to discuss with family possible Shingrix and/or Pneumovax vaccination due to patient's high risk.     No follow-ups on file. Side effects, adverse effects of the medication prescribed today, as well as treatment plan and result expectations have been discussed withthe patient who expresses understanding and desires to proceed.     Jana Orona

## 2022-03-14 ENCOUNTER — OFFICE VISIT (OUTPATIENT)
Dept: GERIATRIC MEDICINE | Age: 82
End: 2022-03-14
Payer: MEDICARE

## 2022-03-14 DIAGNOSIS — G30.1 LATE ONSET ALZHEIMER'S DEMENTIA WITHOUT BEHAVIORAL DISTURBANCE (HCC): ICD-10-CM

## 2022-03-14 DIAGNOSIS — Z00.00 INITIAL MEDICARE ANNUAL WELLNESS VISIT: Primary | ICD-10-CM

## 2022-03-14 DIAGNOSIS — F02.80 LATE ONSET ALZHEIMER'S DEMENTIA WITHOUT BEHAVIORAL DISTURBANCE (HCC): ICD-10-CM

## 2022-03-14 DIAGNOSIS — Z00.00 ROUTINE GENERAL MEDICAL EXAMINATION AT A HEALTH CARE FACILITY: ICD-10-CM

## 2022-03-14 PROCEDURE — G0438 PPPS, INITIAL VISIT: HCPCS | Performed by: PHYSICIAN ASSISTANT

## 2022-03-27 ENCOUNTER — CLINICAL DOCUMENTATION (OUTPATIENT)
Dept: GERIATRIC MEDICINE | Age: 82
End: 2022-03-27

## 2022-03-27 ASSESSMENT — PATIENT HEALTH QUESTIONNAIRE - PHQ9
SUM OF ALL RESPONSES TO PHQ QUESTIONS 1-9: 0
DEPRESSION UNABLE TO ASSESS: FUNCTIONAL CAPACITY MOTIVATION LIMITS ACCURACY
SUM OF ALL RESPONSES TO PHQ QUESTIONS 1-9: 0
SUM OF ALL RESPONSES TO PHQ QUESTIONS 1-9: 0
2. FEELING DOWN, DEPRESSED OR HOPELESS: 0
SUM OF ALL RESPONSES TO PHQ9 QUESTIONS 1 & 2: 0
1. LITTLE INTEREST OR PLEASURE IN DOING THINGS: 0
SUM OF ALL RESPONSES TO PHQ QUESTIONS 1-9: 0

## 2022-03-27 ASSESSMENT — LIFESTYLE VARIABLES: HOW OFTEN DO YOU HAVE A DRINK CONTAINING ALCOHOL: NEVER

## 2022-03-29 NOTE — PATIENT INSTRUCTIONS
Personalized Preventive Plan for Yony Collins - 3/14/2022  Medicare offers a range of preventive health benefits. Some of the tests and screenings are paid in full while other may be subject to a deductible, co-insurance, and/or copay. Some of these benefits include a comprehensive review of your medical history including lifestyle, illnesses that may run in your family, and various assessments and screenings as appropriate. After reviewing your medical record and screening and assessments performed today your provider may have ordered immunizations, labs, imaging, and/or referrals for you. A list of these orders (if applicable) as well as your Preventive Care list are included within your After Visit Summary for your review. Other Preventive Recommendations:    · A preventive eye exam performed by an eye specialist is recommended every 1-2 years to screen for glaucoma; cataracts, macular degeneration, and other eye disorders. · A preventive dental visit is recommended every 6 months. · Try to get at least 150 minutes of exercise per week or 10,000 steps per day on a pedometer . · Order or download the FREE \"Exercise & Physical Activity: Your Everyday Guide\" from [employer]. Call 6-604.562.3148 or search [employer] online. · You need 8773-9949 mg of calcium and 5037-4806 IU of vitamin D per day. It is possible to meet your calcium requirement with diet alone, but a vitamin D supplement is usually necessary to meet this goal.  · When exposed to the sun, use a sunscreen that protects against both UVA and UVB radiation with an SPF of 30 or greater. Reapply every 2 to 3 hours or after sweating, drying off with a towel, or swimming. · Always wear a seat belt when traveling in a car. Always wear a helmet when riding a bicycle or motorcycle.

## 2022-08-30 ENCOUNTER — OFFICE VISIT (OUTPATIENT)
Dept: GERIATRIC MEDICINE | Age: 82
End: 2022-08-30
Payer: MEDICARE

## 2022-08-30 DIAGNOSIS — F02.80 LATE ONSET ALZHEIMER'S DEMENTIA WITHOUT BEHAVIORAL DISTURBANCE (HCC): Primary | ICD-10-CM

## 2022-08-30 DIAGNOSIS — G30.1 LATE ONSET ALZHEIMER'S DEMENTIA WITHOUT BEHAVIORAL DISTURBANCE (HCC): Primary | ICD-10-CM

## 2022-08-30 PROCEDURE — 1123F ACP DISCUSS/DSCN MKR DOCD: CPT | Performed by: PHYSICIAN ASSISTANT

## 2022-08-30 NOTE — PROGRESS NOTES
Subjective:      Patient ID: Jaylan Sy is a pleasant 80 y.o. female who presents today for:  No chief complaint on file. [address] [facility]    Patient seen today for history of late onset Alzheimer's dementia without behavior disturbance, as well as need for POA paperwork to settle financial affairs. Did speak with POA POA today about providing letter allowing designated legal family representative to assist with sale of patient's state. Patient has been living here for approximate 2years with slowly advancing dementia. No new exacerbation of symptoms at this time. She is getting along well with staff and fellow patients in memory care unit. Has had some recent minor falls, other wise no significant change. Will have letter documenting patient cognitive status to present to respective authority for evaluation. We will leave contact information for follow-up questioning as needed. Patient Active Problem List   Diagnosis    Closed fracture of sacrum, initial encounter ([facility]    Lumbar transverse process fracture, closed, initial encounter ([facility]    History of COVID-19    Late onset Alzheimer's dementia without behavioral disturbance ([facility]     Past Medical History:   Diagnosis Date    Asymptomatic varicose veins of both lower extremities     Dementia with behavioral disturbance (HCC)     Elevated blood pressure reading     Full dentures     History of hip fracture 2012    Small vessel disease, cerebrovascular      Past Surgical History:   Procedure Laterality Date    HIP FRACTURE SURGERY  2012     Social History     Socioeconomic History    Marital status:       Spouse name: Not on file    Number of children: Not on file    Years of education: Not on file    Highest education level: Not on file   Occupational History    Not on file   Tobacco Use    Smoking status: Unknown    Smokeless tobacco: Never   Substance and Sexual Activity    Alcohol use: Not on file    Drug use: Not on file Sexual activity: Not on file   Other Topics Concern    Not on file   Social History Narrative    No children    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    [facility]ides at [facility] living [facility]     Social Determinants of Health     Financial Resource Strain: Not on [employer] Insecurity: Not on file   Transportation Needs: Not on file   Physical Activity: Insufficiently Active    Days of Exercise per Week: 2 days    Minutes of Exercise per Session: 30 min   Stress: Not on file   Social Connections: Not on file   Intimate Partner Violence: Not on file   Housing Stability: Not on file     Family History   Family history unknown: Yes     Allergies   Allergen Reactions    Bactrim [Sulfamethoxazole-Trimethoprim]     Bee Venom     Ceftin [Cefuroxime]     Wasp Venom          Review of Systems   Unable to perform ROS: Dementia     Objective: There were no vitals taken for this visit. Physical Exam  Constitutional:       General: She is not in acute distress. Appearance: Normal appearance. She is not ill-appearing. HENT:      Head: Normocephalic and atraumatic. Mouth/Throat:      Mouth: Mucous membranes are moist.      Pharynx: Oropharynx is clear. Eyes:      Pupils: Pupils are equal, round, and reactive to light. Cardiovascular:      Rate and Rhythm: Normal rate and regular rhythm. Pulmonary:      Effort: Pulmonary effort is normal. No respiratory distress. Breath sounds: Normal breath sounds. Abdominal:      General: Bowel sounds are normal. There is no distension. Palpations: There is no mass. Tenderness: There is no abdominal tenderness. There is no guarding or rebound. Musculoskeletal:         General: Normal range of motion. Cervical back: Normal range of motion and neck supple. Skin:     General: Skin is warm. Neurological:      General: No focal deficit present. Mental Status: She is alert. She is disoriented. Motor: Weakness present. Gait: Gait abnormal (intermittent shows shuffling gait). Psychiatric:         Mood and Affect: Mood normal.      Comments: A&O x 1  SLUMS assessment frequently between 2-4/30 with significant prompting. Assessment:       Diagnosis Orders   1. Late onset Alzheimer's dementia without behavioral disturbance ([facility]              Plan:          No new significant changes to patient condition. No significant weight loss or change in status. We will fill out paperwork today and have letter sent to [facility] representative. No follow-ups on file. Side effects, adverse effects of the medication prescribed today, as well as treatment plan and result expectations have been discussed withthe patient who expresses understanding and desires to proceed.     Michi Keller, [address]

## 2022-09-06 ENCOUNTER — OFFICE VISIT (OUTPATIENT)
Dept: GERIATRIC MEDICINE | Age: 82
End: 2022-09-06
Payer: MEDICARE

## 2022-09-06 DIAGNOSIS — S32.009A LUMBAR TRANSVERSE PROCESS FRACTURE, CLOSED, INITIAL ENCOUNTER (HCC): ICD-10-CM

## 2022-09-06 DIAGNOSIS — Z86.16 HISTORY OF COVID-19: ICD-10-CM

## 2022-09-06 DIAGNOSIS — S32.10XA CLOSED FRACTURE OF SACRUM, INITIAL ENCOUNTER (HCC): ICD-10-CM

## 2022-09-06 DIAGNOSIS — E55.9 VITAMIN D DEFICIENCY: ICD-10-CM

## 2022-09-06 DIAGNOSIS — F02.80 LATE ONSET ALZHEIMER'S DEMENTIA WITHOUT BEHAVIORAL DISTURBANCE (HCC): Primary | ICD-10-CM

## 2022-09-06 DIAGNOSIS — G30.1 LATE ONSET ALZHEIMER'S DEMENTIA WITHOUT BEHAVIORAL DISTURBANCE (HCC): Primary | ICD-10-CM

## 2022-09-06 PROCEDURE — 1123F ACP DISCUSS/DSCN MKR DOCD: CPT | Performed by: PHYSICIAN ASSISTANT

## 2022-09-20 PROBLEM — E55.9 VITAMIN D DEFICIENCY: Status: ACTIVE | Noted: 2022-09-20

## 2022-09-20 NOTE — PROGRESS NOTES
Subjective:      Patient ID: Rj Fisher is a pleasant 80 y.o. female who presents today for:  No chief complaint on file. [address] [facility]    Patient seen today for annual visit for chronic issues. Patient seen in the memory care unit today in her room, allergies NKA, allergic to bee venom and salsa as well as wasps and cefuroxime. Patient is fully COVID vaccinated, last booster 7/12/2022. Patient is a DNR CC. She is on a regular diet. History of late onset Alzheimer's dementia, history of sacral fractures, lumbar transverse process fracture, and a history of falls. History of [id number] infx, uncomplicated. Patient is alert and oriented times herself today. She is a very pleasant and overall cooperative with exam.  Unable to supply any ROS today. Patient is on a low-dose Aricept 5 mg daily. Unsure how much patient would benefit from further dose increase at this time, however will attempt to have nurse contact family to see if they would want to attempt a dose increase despite unlikelihood of significant improvement due to advanced dementia. Patient currently has no new acute concerns. No new follow-up scheduled at all for specialty care at the moment. We will attempt to track down Shingrix and pneumococcal vaccine status, and if not vaccinated may offer to POA for consideration.       Patient Active Problem List   Diagnosis    Closed fracture of sacrum, initial encounter ([address]    Lumbar transverse process fracture, closed, initial encounter ([facility]    History of COVID-19    Late onset Alzheimer's dementia without behavioral disturbance ([facility]    Vitamin D deficiency     Past Medical History:   Diagnosis Date    Asymptomatic varicose veins of both lower extremities     Dementia with behavioral disturbance (HCC)     Elevated blood pressure reading     Full dentures     History of hip fracture 2012    Small vessel disease, cerebrovascular      Past Surgical History:   Procedure Laterality nutritional staff. Current longstanding medical problems and acute medical issues addressed with staff. Available data and data elements in on site paper chart reviewed and analyzed. Current external consultant notes reviewed in on site chart. Ordered laboratory testing and imaging will be reviewed when available. This patient's need for psychiatric medication has been reviewed. Will consider further adjustment and possible further evaluations by mental health services. Side effects, adverse effects of the medication prescribed today, as well as treatment plan and result expectations have been discussed withthe patient who expresses understanding and desires to proceed.

## 2022-12-24 ENCOUNTER — HOSPITAL ENCOUNTER (EMERGENCY)
Age: 82
Discharge: HOME | End: 2022-12-24
Attending: EMERGENCY MEDICINE
Payer: MEDICARE

## 2022-12-24 VITALS
OXYGEN SATURATION: 96 % | SYSTOLIC BLOOD PRESSURE: 147 MMHG | DIASTOLIC BLOOD PRESSURE: 51 MMHG | TEMPERATURE: 97.1 F | BODY MASS INDEX: 20.98 KG/M2 | HEART RATE: 74 BPM | RESPIRATION RATE: 18 BRPM | WEIGHT: 130 LBS
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
## 2022-12-24 PROCEDURE — 99285 EMERGENCY DEPT VISIT HI MDM: CPT

## 2022-12-24 PROCEDURE — 6830039000 HC L3 TRAUMA ALERT

## 2022-12-24 ASSESSMENT — LIFESTYLE VARIABLES
HOW OFTEN DO YOU HAVE A DRINK CONTAINING ALCOHOL: NEVER
HOW MANY STANDARD DRINKS CONTAINING ALCOHOL DO YOU HAVE ON A TYPICAL DAY: PATIENT DOES NOT DRINK

## 2022-12-24 ASSESSMENT — PAIN - FUNCTIONAL ASSESSMENT: PAIN_FUNCTIONAL_ASSESSMENT: PAIN ASSESSMENT IN ADVANCED DEMENTIA (PAINAD)

## 2022-12-24 ASSESSMENT — PAIN SCALES - PAIN ASSESSMENT IN ADVANCED DEMENTIA (PAINAD)
BODYLANGUAGE: 0
BREATHING: 0
TOTALSCORE: 1
CONSOLABILITY: 0
NEGVOCALIZATION: 1
FACIALEXPRESSION: 0

## 2022-12-24 ASSESSMENT — PAIN DESCRIPTION - LOCATION: LOCATION: GENERALIZED

## 2022-12-24 NOTE — ED PROVIDER NOTES
[address] [facility] ED  EMERGENCY DEPARTMENT ENCOUNTER      Pt Name: Cesar Garcia  MRN: [id number]  [address] 1940  Date of evaluation: 12/24/2022  Provider: Yang Torres DO    CHIEF COMPLAINT       Chief Complaint   Patient presents with    Fall         HISTORY OF PRESENT ILLNESS   (Location/Symptom, Timing/Onset, Context/Setting, Quality, Duration, Modifying Factors, Severity)  Note limiting factors. Cesar Garcia is a 80 y.o. female who presents to the emergency department . Patient with severe dementia from a memory care unit brought in because they found her sitting on the floor by her chair this morning. There was concern that she may have fallen. Patient cannot answer any questions appropriately. Severe dementia. History of hip fracture. HPI    Nursing Notes were reviewed. REVIEW OF SYSTEMS    (2-9 systems for level 4, 10 or more for level 5)     Review of Systems   Unable to perform ROS: Dementia     Except as noted above the remainder of the review of systems was reviewed and negative.        PAST MEDICAL HISTORY     Past Medical History:   Diagnosis Date    Asymptomatic varicose veins of both lower extremities     Dementia with behavioral disturbance     Elevated blood pressure reading     Full dentures     History of hip fracture 2012    Small vessel disease, cerebrovascular          SURGICAL HISTORY       Past Surgical History:   Procedure Laterality Date    HIP FRACTURE SURGERY  2012         CURRENT MEDICATIONS       Previous Medications    ACETAMINOPHEN (TYLENOL) 325 MG TABLET    Take 2 tablets by mouth every 4 hours as needed for Pain    DONEPEZIL (ARICEPT) 5 MG TABLET    Take 5 mg by mouth daily     KETOCONAZOLE (NIZORAL) 2 % CREAM    Apply topically 2 times daily Apply topically daily L foot    LACTOBACILLUS (ACIDOPHILUS) CAPS CAPSULE    Take 1 capsule by mouth 2 times daily    QUETIAPINE (SEROQUEL) 25 MG TABLET    Take 0.5 tablets by mouth daily    VITAMIN D3 (CHOLECALCIFEROL) 25 MCG (1000 UT) TABS TABLET    Take 1,000 Units by mouth daily       ALLERGIES     Bactrim [sulfamethoxazole-trimethoprim], Bee venom, Ceftin [cefuroxime], and Wasp venom    FAMILY HISTORY       Family History   Family history unknown: Yes          SOCIAL HISTORY       Social History     Socioeconomic History    Marital status:      Spouse name: None    Number of children: None    Years of education: None    Highest education level: None   Tobacco Use    Smoking status: Unknown    Smokeless tobacco: Never   Vaping Use    Vaping Use: Never used   Social History Narrative    No children    States she has worked in [employer] in [address] dementia with behavior problems in 2020    Resides at [facility] living [facility]     Social Determinants of Health     Physical Activity: Insufficiently Active    Days of Exercise per Week: 2 days    Minutes of Exercise per Session: 30 min       SCREENINGS        [place] Coma Scale  Eye Opening: Spontaneous  Best Verbal Response: Oriented  Best Motor Response: Obeys commands  [place] Coma Scale Score: 15               PHYSICAL EXAM    (up to 7 for level 4, 8 or more for level 5)     ED Triage Vitals [12/24/22 0751]   BP Temp Temp Source Heart Rate Resp SpO2 Height Weight   (!) 147/51 97.1 °F (36.2 °C) Oral 74 18 96 % -- 130 lb (59 kg)       Physical Exam  Constitutional:       General: She is not in acute distress. Appearance: She is well-developed. She is not diaphoretic. HENT:      Head: Normocephalic and atraumatic. Right Ear: External ear normal.      Left Ear: External ear normal.      Nose: Nose normal.      Mouth/Throat:      Mouth: Mucous membranes are moist.      Pharynx: No oropharyngeal exudate. Eyes:      Extraocular Movements: Extraocular movements intact. Conjunctiva/sclera: Conjunctivae normal.      Pupils: Pupils are equal, round, and reactive to light. Neck:      Thyroid: No thyromegaly.       Vascular: No JVD. Trachea: No tracheal deviation. Cardiovascular:      Rate and Rhythm: Normal rate. Heart sounds: Normal heart sounds. No murmur heard. Pulmonary:      Effort: Pulmonary effort is normal. No respiratory distress. Breath sounds: Normal breath sounds. No wheezing. Abdominal:      General: Bowel sounds are normal.      Palpations: Abdomen is soft. Tenderness: There is no abdominal tenderness. There is no guarding. Musculoskeletal:         General: Normal range of motion. Cervical back: Normal range of motion and neck supple. Right lower leg: No edema. Left lower leg: No edema. Skin:     General: Skin is warm and dry. Findings: No rash. Neurological:      Mental Status: She is alert and oriented to person, place, and time. Mental status is at baseline. Cranial Nerves: No cranial nerve deficit. Psychiatric:         Behavior: Behavior normal.       DIAGNOSTIC RESULTS     EKG: All EKG's are interpreted by the Emergency Department Physician who either signs or Co-signs this chart in the absence of a cardiologist.        RADIOLOGY:   Non-plain film images such as CT, Ultrasound and MRI are read by the radiologist. Plain radiographic images are visualized and preliminarily interpreted by the emergency physician with the below findings:        Interpretation per the Radiologist below, if available at the time of this note:    No orders to display         ED BEDSIDE ULTRASOUND:   Performed by ED Physician - none    LABS:  Labs Reviewed - No data to display    All other labs were within normal range or not returned as of this dictation. EMERGENCY DEPARTMENT COURSE and DIFFERENTIAL DIAGNOSIS/MDM:   Vitals:    Vitals:    12/24/22 0751   BP: (!) 147/51   Pulse: 74   Resp: 18   Temp: 97.1 °F (36.2 °C)   TempSrc: Oral   SpO2: 96%   Weight: 130 lb (59 kg)       Patient sent in as a precaution for us to make sure she had no injuries for presumed fall.   I could not find any areas of trauma. Nothing to suggest head trauma. Moving all extremities as usual.  Alert to self which is her baseline. Patient can be discharged back to the [address] [place] [address] Drive time was 0 minutes, excluding separately reportable procedures. There was a high probability of clinically significant/life threatening deterioration in the patient's condition which required my urgent intervention. CONSULTS:  None    PROCEDURES:  Unless otherwise noted below, none     Procedures        FINAL IMPRESSION      1. Fall, initial encounter          DISPOSITION/PLAN   DISPOSITION Decision To Discharge 12/24/2022 07:56:46 AM      PATIENT REFERRED TO:  Queen Michelle MD  [address] [address]  [phone]      As needed      DISCHARGE MEDICATIONS:  New Prescriptions    No medications on file     Controlled Substances Monitoring:     No flowsheet data found.     (Please note that portions of this note were completed with a voice recognition program.  Efforts were made to edit the dictations but occasionally words are mis-transcribed.)    Iram Baltazar DO (electronically signed)  Attending Emergency Physician            Iram Baltazar DO  12/24/22 6537

## 2022-12-26 ENCOUNTER — HOSPITAL ENCOUNTER (EMERGENCY)
Age: 82
Discharge: HOME | End: 2022-12-26
Attending: EMERGENCY MEDICINE
Payer: MEDICARE

## 2022-12-26 ENCOUNTER — APPOINTMENT (OUTPATIENT)
Dept: CT IMAGING | Age: 82
End: 2022-12-26
Payer: MEDICARE

## 2022-12-26 VITALS
RESPIRATION RATE: 14 BRPM | DIASTOLIC BLOOD PRESSURE: 60 MMHG | TEMPERATURE: 97.9 F | OXYGEN SATURATION: 98 % | SYSTOLIC BLOOD PRESSURE: 138 MMHG | HEART RATE: 106 BPM
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
## 2022-12-26 PROCEDURE — 99284 EMERGENCY DEPT VISIT MOD MDM: CPT | Performed by: EMERGENCY MEDICINE

## 2022-12-26 PROCEDURE — 70450 CT HEAD/BRAIN W/O DYE: CPT

## 2022-12-26 ASSESSMENT — ENCOUNTER SYMPTOMS
CHEST TIGHTNESS: 0
VOMITING: 0
SHORTNESS OF BREATH: 0
SORE THROAT: 0
NAUSEA: 0
EYE PAIN: 0
ABDOMINAL PAIN: 0

## 2022-12-26 ASSESSMENT — PAIN - FUNCTIONAL ASSESSMENT
PAIN_FUNCTIONAL_ASSESSMENT: NONE - DENIES PAIN
PAIN_FUNCTIONAL_ASSESSMENT: NONE - DENIES PAIN

## 2022-12-26 NOTE — ED NOTES
Pt arrived to ED via EMS with c/o of fall. SNF staff states that pt has had multiple recent falls. Pt denies falling. Pt states she was not given food and was trying to find some. Pt has dementia, pt is at baseline. Pt denies chest pain, abd pain, n/v/d, numbness, tingling and any other body pains. Pt does not appear to be in distress at this time.       Silverio Araya RN  12/26/22 3175

## 2022-12-26 NOTE — ED PROVIDER NOTES
[facility] ED  EMERGENCY DEPARTMENT ENCOUNTER      Pt Name: Tamara Manley  MRN: [id number]  [place] 1940  Date of evaluation: 12/26/2022  Provider: Severo Tipton DO    CHIEF COMPLAINT       Chief Complaint   Patient presents with    Fall         HISTORY OF PRESENT ILLNESS   (Location/Symptom, Timing/Onset, Context/Setting, Quality, Duration, Modifying Factors, Severity)  Note limiting factors. Tamara Manley is a 80 y.o. female who presents to the emergency department . Patient with Alzheimer's dementia was found sitting on the floor. Nursing home staff concerned about a fall. Patient was just seen here 2 days ago for the same exact scenario sitting on the floor with no injury. Patient has no complaints. Denies any pain anywhere. HPI    Nursing Notes were reviewed. REVIEW OF SYSTEMS    (2-9 systems for level 4, 10 or more for level 5)     Review of Systems   Constitutional:  Negative for activity change, appetite change and fatigue. HENT:  Negative for congestion and sore throat. Eyes:  Negative for pain and visual disturbance. Respiratory:  Negative for chest tightness and shortness of breath. Cardiovascular:  Negative for chest pain. Gastrointestinal:  Negative for abdominal pain, nausea and vomiting. Endocrine: Negative for polydipsia. Genitourinary:  Negative for flank pain and urgency. Musculoskeletal:  Negative for gait problem and neck stiffness. Skin:  Negative for rash. Neurological:  Negative for weakness, light-headedness and headaches. Psychiatric/Behavioral:  Negative for confusion and sleep disturbance. Except as noted above the remainder of the review of systems was reviewed and negative.        PAST MEDICAL HISTORY     Past Medical History:   Diagnosis Date    Asymptomatic varicose veins of both lower extremities     Dementia with behavioral disturbance     Elevated blood pressure reading     Full dentures     History of hip fracture 2012 Small vessel disease, cerebrovascular          SURGICAL HISTORY       Past Surgical History:   Procedure Laterality Date    HIP FRACTURE SURGERY  2012         CURRENT MEDICATIONS       Previous Medications    ACETAMINOPHEN (TYLENOL) 325 MG TABLET    Take 2 tablets by mouth every 4 hours as needed for Pain    DONEPEZIL (ARICEPT) 5 MG TABLET    Take 5 mg by mouth daily     KETOCONAZOLE (NIZORAL) 2 % CREAM    Apply topically 2 times daily Apply topically daily L foot    LACTOBACILLUS (ACIDOPHILUS) CAPS CAPSULE    Take 1 capsule by mouth 2 times daily    QUETIAPINE (SEROQUEL) 25 MG TABLET    Take 0.5 tablets by mouth daily    VITAMIN D3 (CHOLECALCIFEROL) 25 MCG (1000 UT) TABS TABLET    Take 1,000 Units by mouth daily       ALLERGIES     Bactrim [sulfamethoxazole-trimethoprim], Bee venom, Ceftin [cefuroxime], and Wasp venom    FAMILY HISTORY       Family History   Family history unknown: Yes          SOCIAL HISTORY       Social History     Socioeconomic History    Marital status:       Spouse name: None    Number of children: None    Years of education: None    Highest education level: None   Tobacco Use    Smoking status: Unknown    Smokeless tobacco: Never   Vaping Use    Vaping Use: Never used   Social History Narrative    No children    States she has worked in [employer] in [address] [facility] [employer] dementia with behavior problems in 2020    Resides at [facility] living [facility]     Social Determinants of Health     Physical Activity: Insufficiently Active    Days of Exercise per Week: 2 days    Minutes of Exercise per Session: 30 min       SCREENINGS        Darcy Coma Scale  Eye Opening: Spontaneous  Best Verbal Response: Oriented  Best Motor Response: Obeys commands  Darcy Coma Scale Score: 15               PHYSICAL EXAM    (up to 7 for level 4, 8 or more for level 5)     ED Triage Vitals [12/26/22 0719]   BP Temp Temp Source Heart Rate Resp SpO2 Height Weight   (!) 131/57 97.9 °F (36.6 °C) Oral 80 18 96 % -- --       Physical Exam  Vitals and nursing note reviewed. Constitutional:       General: She is not in acute distress. Appearance: She is well-developed. She is not diaphoretic. HENT:      Head: Normocephalic and atraumatic. Right Ear: External ear normal.      Left Ear: External ear normal.      Nose: Nose normal.      Mouth/Throat:      Mouth: Mucous membranes are moist.      Pharynx: No oropharyngeal exudate. Eyes:      Extraocular Movements: Extraocular movements intact. Conjunctiva/sclera: Conjunctivae normal.      Pupils: Pupils are equal, round, and reactive to light. Neck:      Thyroid: No thyromegaly. Vascular: No JVD. Trachea: No tracheal deviation. Cardiovascular:      Rate and Rhythm: Normal rate. Heart sounds: Normal heart sounds. No murmur heard. Pulmonary:      Effort: Pulmonary effort is normal. No respiratory distress. Breath sounds: Normal breath sounds. No wheezing. Abdominal:      General: Bowel sounds are normal.      Palpations: Abdomen is soft. Tenderness: There is no abdominal tenderness. There is no guarding. Musculoskeletal:         General: Normal range of motion. Cervical back: Normal range of motion and neck supple. Right lower leg: No edema. Left lower leg: No edema. Skin:     General: Skin is warm and dry. Findings: No rash. Neurological:      Mental Status: She is alert and oriented to person, place, and time. Mental status is at baseline. Cranial Nerves: No cranial nerve deficit.    Psychiatric:         Behavior: Behavior normal.       DIAGNOSTIC RESULTS     EKG: All EKG's are interpreted by the Emergency Department Physician who either signs or Co-signs this chart in the absence of a cardiologist.        RADIOLOGY:   Non-plain film images such as CT, Ultrasound and MRI are read by the radiologist. Plain radiographic images are visualized and preliminarily interpreted by the emergency physician with the below findings:        Interpretation per the Radiologist below, if available at the time of this note:    CT Head W/O Contrast   Final Result   No acute intracranial abnormality. There is moderate parenchymal volume loss likely age related. Lateral ventricles are asymmetrically prominent. If there is history of   dementia, incontinence, ataxia normal pressure hydrocephalus could be   considered. White matter lucency is nonspecific but likely represents chronic   microvascular ischemic change. ED BEDSIDE ULTRASOUND:   Performed by ED Physician - none    LABS:  Labs Reviewed - No data to display    All other labs were within normal range or not returned as of this dictation. EMERGENCY DEPARTMENT COURSE and DIFFERENTIAL DIAGNOSIS/MDM:   Vitals:    Vitals:    12/26/22 0719   BP: (!) 131/57   Pulse: 80   Resp: 18   Temp: 97.9 °F (36.6 °C)   TempSrc: Oral   SpO2: 96%       Patient is here for the second time in 3 days for being found sitting on the floor. There was no sign of trauma today nor was or signs of trauma 2 days ago. I did CT her brain today to be cautious. Patient can be discharged home. Nursing home needs to figure out a way to prevent patients from falling on the ground or sliding off their chair. MDM        REASSESSMENT          CRITICAL CARE TIME   Total Critical Care time was 0 minutes, excluding separately reportable procedures. There was a high probability of clinically significant/life threatening deterioration in the patient's condition which required my urgent intervention. CONSULTS:  None    PROCEDURES:  Unless otherwise noted below, none     Procedures        FINAL IMPRESSION      1.  Fall, initial encounter          DISPOSITION/PLAN   DISPOSITION Decision To Discharge 12/26/2022 08:30:45 AM      PATIENT REFERRED TO:  Agus Aragon MD  [address] [address] [phone]      As needed      DISCHARGE MEDICATIONS:  New Prescriptions    No medications on file     Controlled Substances Monitoring:     No flowsheet data found.     (Please note that portions of this note were completed with a voice recognition program.  Efforts were made to edit the dictations but occasionally words are mis-transcribed.)    Karime Sofia DO (electronically signed)  Attending Emergency Physician            Karime Sofia DO  12/26/22 3879

## 2022-12-26 NOTE — ED NOTES
[facility] here to take patient back to [facility]. No distress noted.       King Edward, RN  12/26/22 4245

## 2022-12-26 NOTE — ED NOTES
Report attempted to [facility], no answer.  Will attempt again     Alee Perez RN  12/26/22 9031

## 2022-12-26 NOTE — ED NOTES
Pt remains eating. No distress noted.  Awaiting [facility] to pick her up     Sherine Yang, RN  12/26/22 7311

## 2023-01-01 ENCOUNTER — OFFICE VISIT (OUTPATIENT)
Dept: GERIATRIC MEDICINE | Age: 83
End: 2023-01-01

## 2023-01-01 DIAGNOSIS — U07.1 COVID-19: Primary | ICD-10-CM

## 2023-01-03 ENCOUNTER — OFFICE VISIT (OUTPATIENT)
Dept: GERIATRIC MEDICINE | Age: 83
End: 2023-01-03
Payer: MEDICARE

## 2023-01-03 DIAGNOSIS — R41.82 ALTERED MENTAL STATUS, UNSPECIFIED ALTERED MENTAL STATUS TYPE: Primary | ICD-10-CM

## 2023-01-03 DIAGNOSIS — Z91.89 AT RISK FOR DEHYDRATION: ICD-10-CM

## 2023-01-03 DIAGNOSIS — F02.80 LATE ONSET ALZHEIMER'S DEMENTIA WITHOUT BEHAVIORAL DISTURBANCE (HCC): ICD-10-CM

## 2023-01-03 DIAGNOSIS — E86.0 MILD DEHYDRATION: ICD-10-CM

## 2023-01-03 DIAGNOSIS — G30.1 LATE ONSET ALZHEIMER'S DEMENTIA WITHOUT BEHAVIORAL DISTURBANCE (HCC): ICD-10-CM

## 2023-01-03 PROCEDURE — 99348 HOME/RES VST EST LOW MDM 30: CPT | Performed by: PHYSICIAN ASSISTANT

## 2023-01-03 PROCEDURE — 1123F ACP DISCUSS/DSCN MKR DOCD: CPT | Performed by: PHYSICIAN ASSISTANT

## 2023-01-04 LAB
ANION GAP SERPL CALCULATED.3IONS-SCNC: 12 MEQ/L (ref 9–15)
BUN BLDV-MCNC: 8 MG/DL (ref 8–23)
CALCIUM SERPL-MCNC: 8.5 MG/DL (ref 8.5–9.9)
CHLORIDE BLD-SCNC: 104 MEQ/L (ref 95–107)
CO2: 25 MEQ/L (ref 20–31)
CREAT SERPL-MCNC: 0.71 MG/DL (ref 0.5–0.9)
GFR SERPL CREATININE-BSD FRML MDRD: >60 ML/MIN/{1.73_M2}
GLUCOSE BLD-MCNC: 88 MG/DL (ref 70–99)
POTASSIUM SERPL-SCNC: 4.6 MEQ/L (ref 3.4–4.9)
SODIUM BLD-SCNC: 141 MEQ/L (ref 135–144)

## 2023-01-07 LAB
BACTERIA: NEGATIVE /HPF
BILIRUBIN URINE: NEGATIVE
BLOOD, URINE: NEGATIVE
CLARITY: CLEAR
COLOR: ABNORMAL
EPITHELIAL CELLS, UA: NORMAL /HPF (ref 0–5)
GLUCOSE URINE: NEGATIVE MG/DL
HYALINE CASTS: NORMAL /HPF (ref 0–5)
KETONES, URINE: NEGATIVE MG/DL
LEUKOCYTE ESTERASE, URINE: ABNORMAL
NITRITE, URINE: NEGATIVE
PH UA: 6.5 (ref 5–9)
PROTEIN UA: NEGATIVE MG/DL
RBC UA: NORMAL /HPF (ref 0–5)
REASON FOR REJECTION: NORMAL
REJECTED TEST: NORMAL
SPECIFIC GRAVITY UA: 1.02 (ref 1–1.03)
UROBILINOGEN, URINE: 1 E.U./DL
WBC UA: NORMAL /HPF (ref 0–5)

## 2023-01-08 LAB
BASOPHILS ABSOLUTE: 0.1 K/UL (ref 0–0.2)
BASOPHILS RELATIVE PERCENT: 1 %
EOSINOPHILS ABSOLUTE: 0 K/UL (ref 0–0.7)
EOSINOPHILS RELATIVE PERCENT: 0.1 %
HCT VFR BLD CALC: 37.3 % (ref 37–47)
HEMOGLOBIN: 12.5 G/DL (ref 12–16)
INFLUENZA A BY PCR: NEGATIVE
INFLUENZA B BY PCR: NEGATIVE
LYMPHOCYTES ABSOLUTE: 0.9 K/UL (ref 1–4.8)
LYMPHOCYTES RELATIVE PERCENT: 7 %
MCH RBC QN AUTO: 27.9 PG (ref 27–31.3)
MCHC RBC AUTO-ENTMCNC: 33.5 % (ref 33–37)
MCV RBC AUTO: 83.5 FL (ref 79.4–94.8)
MONOCYTES ABSOLUTE: 1.5 K/UL (ref 0.2–0.8)
MONOCYTES RELATIVE PERCENT: 11.2 %
NEUTROPHILS ABSOLUTE: 10.9 K/UL (ref 1.4–6.5)
NEUTROPHILS RELATIVE PERCENT: 81 %
PDW BLD-RTO: 14.4 % (ref 11.5–14.5)
PLATELET # BLD: 339 K/UL (ref 130–400)
PLATELET SLIDE REVIEW: NORMAL
RBC # BLD: 4.47 M/UL (ref 4.2–5.4)
RBC # BLD: NORMAL 10*6/UL
WBC # BLD: 13.5 K/UL (ref 4.8–10.8)

## 2023-01-09 LAB — URINE CULTURE, ROUTINE: NORMAL

## 2023-01-10 ENCOUNTER — OFFICE VISIT (OUTPATIENT)
Dept: GERIATRIC MEDICINE | Age: 83
End: 2023-01-10
Payer: MEDICARE

## 2023-01-10 DIAGNOSIS — Z86.16 HISTORY OF COVID-19: ICD-10-CM

## 2023-01-10 DIAGNOSIS — G30.1 LATE ONSET ALZHEIMER'S DEMENTIA WITHOUT BEHAVIORAL DISTURBANCE (HCC): ICD-10-CM

## 2023-01-10 DIAGNOSIS — U07.1 COVID-19 VIRUS INFECTION: Primary | ICD-10-CM

## 2023-01-10 DIAGNOSIS — F02.80 LATE ONSET ALZHEIMER'S DEMENTIA WITHOUT BEHAVIORAL DISTURBANCE (HCC): ICD-10-CM

## 2023-01-10 PROCEDURE — 1123F ACP DISCUSS/DSCN MKR DOCD: CPT | Performed by: PHYSICIAN ASSISTANT

## 2023-01-10 PROCEDURE — 99348 HOME/RES VST EST LOW MDM 30: CPT | Performed by: PHYSICIAN ASSISTANT

## 2023-01-17 ENCOUNTER — OFFICE VISIT (OUTPATIENT)
Dept: GERIATRIC MEDICINE | Age: 83
End: 2023-01-17
Payer: MEDICARE

## 2023-01-17 DIAGNOSIS — F02.80 LATE ONSET ALZHEIMER'S DEMENTIA WITHOUT BEHAVIORAL DISTURBANCE (HCC): ICD-10-CM

## 2023-01-17 DIAGNOSIS — U07.1 ASYMPTOMATIC COVID-19 VIRUS INFECTION: Primary | ICD-10-CM

## 2023-01-17 DIAGNOSIS — G30.1 LATE ONSET ALZHEIMER'S DEMENTIA WITHOUT BEHAVIORAL DISTURBANCE (HCC): ICD-10-CM

## 2023-01-17 PROCEDURE — 99347 HOME/RES VST EST SF MDM 20: CPT | Performed by: PHYSICIAN ASSISTANT

## 2023-01-17 PROCEDURE — 1123F ACP DISCUSS/DSCN MKR DOCD: CPT | Performed by: PHYSICIAN ASSISTANT

## 2023-01-25 NOTE — PROGRESS NOTES
Subjective:      Patient ID: Ramiro Bone is a pleasant 80 y.o. female who presents today for:  No chief complaint on file. [facility]      Patient seen today due to concern of increased lethargy, AMS, and poor drinking habits. Patient is seen in the room today watching television. She is mildly lethargic, however seems to be at or near baseline for his behavior concern. Advanced dementia. In memory care unit. No significant findings on clinical exam, no superior tenderness, denies dysuria, no hematuria or fall urine per nursing staff. She is eating and drinking fairly well albeit less normal.  We will perform UA C&S and BMP to assess. Follow-up with lab results. Follow-up if any changes in symptoms. Patient Active Problem List   Diagnosis    Closed fracture of sacrum, initial encounter ([facility] [address]    Lumbar transverse process fracture, closed, initial encounter ([facility] [address]    History of COVID-19    Late onset Alzheimer's dementia without behavioral disturbance ([facility] [address]    Vitamin D deficiency     Past Medical History:   Diagnosis Date    Asymptomatic varicose veins of both lower extremities     Dementia with behavioral disturbance     Elevated blood pressure reading     Full dentures     History of hip fracture 2012    Small vessel disease, cerebrovascular      Past Surgical History:   Procedure Laterality Date    HIP FRACTURE SURGERY  2012     Social History     Socioeconomic History    Marital status:       Spouse name: Not on file    Number of children: Not on file    Years of education: Not on file    Highest education level: Not on file   Occupational History    Not on file   Tobacco Use    Smoking status: Unknown    Smokeless tobacco: Never   Vaping Use    Vaping Use: Never used   Substance and Sexual Activity    Alcohol use: Not on file    Drug use: Not on file    Sexual activity: Not on file   Other Topics Concern    Not on file   Social History Narrative    No children    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    Resides at [facility] living facility     Social Determinants of Health     Financial Resource Strain: Not on [employer] Insecurity: Not on file   Transportation Needs: Not on file   Physical Activity: Insufficiently Active    Days of Exercise per Week: 2 days    Minutes of Exercise per Session: 30 min   Stress: Not on file   Social Connections: Not on file   Intimate Partner Violence: Not on file   Housing Stability: Not on file     Family History   Family history unknown: Yes     Allergies   Allergen Reactions    Bactrim [Sulfamethoxazole-Trimethoprim]     Bee Venom     Ceftin [Cefuroxime]     Wasp Venom          [unfilled]      Review of Systems   Unable to perform ROS: Dementia     Objective:   VS: See [address] Reviewed. Physical Exam  Vitals (Reviewed, see chart) reviewed. Constitutional:       General: She is not in acute distress. Appearance: Normal appearance. She is not ill-appearing. HENT:      Head: Normocephalic and atraumatic. Mouth/Throat:      Pharynx: Oropharynx is clear. Comments: Dry buccal mucosa, moist tongue  Eyes:      General: No scleral icterus. Right eye: No discharge. Left eye: No discharge. Conjunctiva/sclera: Conjunctivae normal.      Pupils: Pupils are equal, round, and reactive to light. Cardiovascular:      Rate and Rhythm: Normal rate and regular rhythm. Pulmonary:      Effort: Pulmonary effort is normal. No respiratory distress. Breath sounds: Normal breath sounds. No wheezing or rhonchi. Abdominal:      General: Abdomen is flat. Bowel sounds are normal. There is no distension. Palpations: Abdomen is soft. Tenderness: There is no abdominal tenderness. There is no guarding. Genitourinary:     Comments: DEFERRED  Musculoskeletal:         General: Normal range of motion.       Cervical back: Normal range of motion and neck supple. Skin:     General: Skin is warm and dry. Coloration: Skin is not pale. Neurological:      General: No focal deficit present. Mental Status: She is alert. She is disoriented. Motor: Weakness present. Gait: Abnormal gait: intermittent shows shuffling gait. Psychiatric:         Mood and Affect: Mood normal.         Behavior: Behavior normal.      Comments: A&O x 1         Assessment:       Diagnosis Orders   1. Altered mental status, unspecified altered mental status type        2. Late onset Alzheimer's dementia without behavioral disturbance ([facility]        3. At risk for dehydration        4. Mild dehydration              Plan:      No orders of the defined types were placed in this encounter. No orders of the defined types were placed in this encounter. We will perform UA C&S, BMP, reassess at later date. Monitor for fluid intake. Monitor for urine output and to discern changes in urine quality and quantity via brief and toileting. No follow-ups on file. Jana Michael      Please note orders entered on site at facility after discussion with appropriate facility nursing/therapy/ / nutritional staff. Current longstanding medical problems and acute medical issues addressed with staff. Available data and data elements in on site paper chart reviewed and analyzed. Current external consultant notes reviewed in on site chart. Ordered laboratory testing and imaging will be reviewed when available. This patient's need for psychiatric medication has been reviewed. Will consider further adjustment and possible further evaluations by mental health services. Side effects, adverse effects of the medication prescribed today, as well as treatment plan and result expectations have been discussed withthe patient who expresses understanding and desires to proceed.     I spent a total of 25 minutes on the date of service which included preparing to see the patient, face-to-face patient care, performing a medically appropriate examination, completing clinical documentation, and on counseling/ eductaing the patient and the family. Please note Nuance Dragon PowerMic III software used for dictation of note,  which may contain minor errors due to ambient noise and indiscriminate speech pickup.

## 2023-02-01 NOTE — PROGRESS NOTES
Subjective:      Patient ID: Beatrice Goldman is a pleasant 80 y.o. female who presents today for:  No chief complaint on file. [facility]    Patient is seen today as a recent COVID-19 infection patient. Patient seen in memory care unit due to her late onset Alzheimer's dementia. Patient is seated in her room in isolation, appears asymptomatic this time. On examination no new breathing issues. LS CTA, RRR. Vital signs are stable, see JUAN CARLOS. She is pleasant cooperative. Very much confused, however no new evidence of increased lethargy outside of normal limits. We will continue monitoring patient we will continue current care parameters for the time being. See chart for details on COVID-19 order set. Patient Active Problem List   Diagnosis    Closed fracture of sacrum, initial encounter ([facility]    Lumbar transverse process fracture, closed, initial encounter ([facility]    History of COVID-19    Late onset Alzheimer's dementia without behavioral disturbance ([address]    Vitamin D deficiency     Past Medical History:   Diagnosis Date    Asymptomatic varicose veins of both lower extremities     Dementia with behavioral disturbance     Elevated blood pressure reading     Full dentures     History of hip fracture 2012    Small vessel disease, cerebrovascular      Past Surgical History:   Procedure Laterality Date    HIP FRACTURE SURGERY  2012     Social History     Socioeconomic History    Marital status:       Spouse name: Not on file    Number of children: Not on file    Years of education: Not on file    Highest education level: Not on file   Occupational History    Not on file   Tobacco Use    Smoking status: Unknown    Smokeless tobacco: Never   Vaping Use    Vaping Use: Never used   Substance and Sexual Activity    Alcohol use: Not on file    Drug use: Not on file    Sexual activity: Not on file   Other Topics Concern    Not on file   Social History Narrative    No children    States she has worked in [employer] in [address] dementia with behavior problems in 2020    Resides at [facility] assisted living [facility]     Social Determinants of Health     Financial Resource Strain: Not on [employer] Insecurity: Not on file   Transportation Needs: Not on file   Physical Activity: Insufficiently Active    Days of Exercise per Week: 2 days    Minutes of Exercise per Session: 30 min   Stress: Not on file   Social Connections: Not on file   Intimate Partner Violence: Not on file   Housing Stability: Not on file     Family History   Family history unknown: Yes     Allergies   Allergen Reactions    Bactrim [Sulfamethoxazole-Trimethoprim]     Bee Venom     Ceftin [Cefuroxime]     Wasp Venom            Review of Systems   Unable to perform ROS: Dementia   All other systems reviewed and are negative. Objective:   VS: See [address] Reviewed. Physical Exam  Vitals (Reviewed, see chart) reviewed. Constitutional:       General: She is not in acute distress. Appearance: Normal appearance. She is not ill-appearing. HENT:      Head: Normocephalic and atraumatic. Mouth/Throat:      Pharynx: Oropharynx is clear. Comments: Dry buccal mucosa, moist tongue  Eyes:      General: No scleral icterus. Right eye: No discharge. Left eye: No discharge. Conjunctiva/sclera: Conjunctivae normal.      Pupils: Pupils are equal, round, and reactive to light. Cardiovascular:      Rate and Rhythm: Normal rate and regular rhythm. Pulses: Normal pulses. Heart sounds: Normal heart sounds. Pulmonary:      Effort: Pulmonary effort is normal. No respiratory distress. Breath sounds: Normal breath sounds. No wheezing or rhonchi. Abdominal:      General: Abdomen is flat. Bowel sounds are normal. There is no distension. Palpations: Abdomen is soft. Tenderness: There is no abdominal tenderness. There is no guarding.    Musculoskeletal:         General: Normal range of motion. Cervical back: Normal range of motion and neck supple. Skin:     General: Skin is warm and dry. Coloration: Skin is not pale. Neurological:      General: No focal deficit present. Mental Status: She is alert. She is disoriented. Motor: Weakness present. Gait: Abnormal gait: intermittent shows shuffling gait. Psychiatric:         Mood and Affect: Mood normal.         Behavior: Behavior normal.      Comments: A&O x 1         Assessment:       Diagnosis Orders   1. COVID-19 virus infection        2. Late onset Alzheimer's dementia without behavioral disturbance ([facility]        3. History of COVID-19              Plan:          Continue current orders. Monitor vital signs routinely. Patient overall asymptomatic and showing no new signs of acute cardiopulmonary distress. Follow-up as needed for any changes. No follow-ups on file. Josefa Naranjo, [address]      Please note orders entered on site at facility after discussion with appropriate facility nursing/therapy/ / nutritional staff. Current longstanding medical problems and acute medical issues addressed with staff. Available data and data elements in on site paper chart reviewed and analyzed. Current external consultant notes reviewed in on site chart. Ordered laboratory testing and imaging will be reviewed when available. This patient's need for psychiatric medication has been reviewed. Will consider further adjustment and possible further evaluations by mental health services. Side effects, adverse effects of the medication prescribed today, as well as treatment plan and result expectations have been discussed withthe patient who expresses understanding and desires to proceed.     I spent a total of 25 minutes on the date of service which included preparing to see the patient, face-to-face patient care, performing a medically appropriate examination, completing clinical documentation, and on counseling/ eductaing the patient and the family. Please note Nuance Dragon PowerMic III software used for dictation of note,  which may contain minor errors due to ambient noise and indiscriminate speech pickup.

## 2023-02-06 NOTE — PROGRESS NOTES
Subjective:      Patient ID: Amarui Hopkins is a pleasant 80 y.o. female who presents today for:  No chief complaint on file. [address] [place] [address]    Patient is seen today due to recent COVID-19 infection. Patient is overall showing no new signs of lethargy, negative for URI symptoms. No cardiorespiratory distress. Pleasant cooperative overall, albeit with dementia sx unchanged. Patient continues to eat and drink as normal.  No new NVD, wheezing, SOB, POI, chest tenderness/CP, or increased cough. Patient is vaccinated for COVID-19. We will continue current order set and follow-up as needed. No further quarantine. Patient Active Problem List   Diagnosis    Closed fracture of sacrum, initial encounter ([facility]    Lumbar transverse process fracture, closed, initial encounter ([facility]    History of COVID-19    Late onset Alzheimer's dementia without behavioral disturbance ([facility]    Vitamin D deficiency     Past Medical History:   Diagnosis Date    Asymptomatic varicose veins of both lower extremities     Dementia with behavioral disturbance     Elevated blood pressure reading     Full dentures     History of hip fracture 2012    Small vessel disease, cerebrovascular      Past Surgical History:   Procedure Laterality Date    HIP FRACTURE SURGERY  2012     Social History     Socioeconomic History    Marital status:       Spouse name: Not on file    Number of children: Not on file    Years of education: Not on file    Highest education level: Not on file   Occupational History    Not on file   Tobacco Use    Smoking status: Unknown    Smokeless tobacco: Never   Vaping Use    Vaping Use: Never used   Substance and Sexual Activity    Alcohol use: Not on file    Drug use: Not on file    Sexual activity: Not on file   Other Topics Concern    Not on file   Social History Narrative    No children    States she has worked in [employer] in [address] [place] [address] [employer] dementia with behavior problems in 2020 Resides at [facility] living [facility]     Social Determinants of Health     Financial Resource Strain: Not on file   Food Insecurity: Not on file   Transportation Needs: Not on file   Physical Activity: Insufficiently Active    Days of Exercise per Week: 2 days    Minutes of Exercise per Session: 30 min   Stress: Not on file   Social Connections: Not on file   Intimate Partner Violence: Not on file   Housing Stability: Not on file     Family History   Family history unknown: Yes     Allergies   Allergen Reactions    Bactrim [Sulfamethoxazole-Trimethoprim]     Bee Venom     Ceftin [Cefuroxime]     Wasp Venom            Review of Systems   Unable to perform ROS: Dementia   All other systems reviewed and are negative. Objective:   VS: See [address] Reviewed. Physical Exam  Vitals (Reviewed, see JUAN CARLOS) reviewed. Constitutional:       General: She is not in acute distress. Appearance: Normal appearance. She is not ill-appearing. HENT:      Head: Normocephalic and atraumatic. Mouth/Throat:      Pharynx: Oropharynx is clear. No oropharyngeal exudate or posterior oropharyngeal erythema. Eyes:      Conjunctiva/sclera: Conjunctivae normal.   Cardiovascular:      Rate and Rhythm: Normal rate and regular rhythm. Pulses: Normal pulses. Heart sounds: Normal heart sounds. Pulmonary:      Effort: Pulmonary effort is normal. No respiratory distress. Breath sounds: Normal breath sounds. No wheezing or rhonchi. Abdominal:      General: Abdomen is flat. Bowel sounds are normal. There is no distension. Palpations: Abdomen is soft. Tenderness: There is no abdominal tenderness. There is no guarding. Musculoskeletal:         General: Normal range of motion. Skin:     General: Skin is warm and dry. Coloration: Skin is not pale. Neurological:      Mental Status: She is alert. Mental status is at baseline. She is disoriented. Motor: Weakness present.       Gait: Abnormal gait: intermittent shows shuffling gait. Psychiatric:         Mood and Affect: Mood normal.         Behavior: Behavior normal.      Comments: A&O x 1         Assessment:       Diagnosis Orders   1. Asymptomatic COVID-19 virus infection        2. Late onset Alzheimer's dementia without behavioral disturbance ([facility]              Plan:        No further quarantine. Continue care parameters. Monitoring vital signs as needed for any acute change. Currently appears to be back at baseline. Follow-up with any change in status. No follow-ups on file. Jana Royal      Please note orders entered on site at facility after discussion with appropriate facility nursing/therapy/ / nutritional staff. Current longstanding medical problems and acute medical issues addressed with staff. Available data and data elements in on site paper chart reviewed and analyzed. Current external consultant notes reviewed in on site chart. Ordered laboratory testing and imaging will be reviewed when available. Side effects, adverse effects of the medication prescribed today, as well as treatment plan and result expectations have been discussed withthe patient who expresses understanding and desires to proceed. I spent a total of 17 minutes on the date of service which included preparing to see the patient, face-to-face patient care, performing a medically appropriate examination, completing clinical documentation, and on counseling/ eductaing the patient and the family. Please note Nuance Dragon PowerMic III software used for dictation of note,  which may contain minor errors due to ambient noise and indiscriminate speech pickup.

## 2023-02-11 ENCOUNTER — HOSPITAL ENCOUNTER (INPATIENT)
Age: 83
LOS: 4 days | Discharge: HOME | DRG: 522 | End: 2023-02-15
Attending: SURGERY | Admitting: SURGERY
Payer: MEDICARE

## 2023-02-11 ENCOUNTER — APPOINTMENT (OUTPATIENT)
Dept: GENERAL RADIOLOGY | Age: 83
DRG: 522 | End: 2023-02-11
Payer: MEDICARE

## 2023-02-11 ENCOUNTER — APPOINTMENT (OUTPATIENT)
Dept: CT IMAGING | Age: 83
DRG: 522 | End: 2023-02-11
Payer: MEDICARE
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
## 2023-02-11 DIAGNOSIS — G89.18 ACUTE POST-OPERATIVE PAIN: ICD-10-CM

## 2023-02-11 DIAGNOSIS — S72.002A CLOSED FRACTURE OF LEFT HIP, INITIAL ENCOUNTER (HCC): Primary | ICD-10-CM

## 2023-02-11 LAB
ALBUMIN SERPL-MCNC: 3.4 G/DL (ref 3.5–4.6)
ALP BLD-CCNC: 93 U/L (ref 40–130)
ALT SERPL-CCNC: 15 U/L (ref 0–33)
ANION GAP SERPL CALCULATED.3IONS-SCNC: 10 MEQ/L (ref 9–15)
APTT: 30.6 SEC (ref 24.4–36.8)
AST SERPL-CCNC: 20 U/L (ref 0–35)
BASOPHILS ABSOLUTE: 0 K/UL (ref 0–0.2)
BASOPHILS RELATIVE PERCENT: 0.7 %
BILIRUB SERPL-MCNC: 0.3 MG/DL (ref 0.2–0.7)
BUN BLDV-MCNC: 9 MG/DL (ref 8–23)
CALCIUM SERPL-MCNC: 8.6 MG/DL (ref 8.5–9.9)
CHLORIDE BLD-SCNC: 104 MEQ/L (ref 95–107)
CO2: 24 MEQ/L (ref 20–31)
CREAT SERPL-MCNC: 0.77 MG/DL (ref 0.5–0.9)
EOSINOPHILS ABSOLUTE: 0.2 K/UL (ref 0–0.7)
EOSINOPHILS RELATIVE PERCENT: 2.8 %
ETHANOL PERCENT: NORMAL G/DL
ETHANOL: <10 MG/DL (ref 0–0.08)
GFR SERPL CREATININE-BSD FRML MDRD: >60 ML/MIN/{1.73_M2}
GLOBULIN: 2.9 G/DL (ref 2.3–3.5)
GLUCOSE BLD-MCNC: 119 MG/DL (ref 70–99)
HCT VFR BLD CALC: 36.8 % (ref 37–47)
HEMOGLOBIN: 12.1 G/DL (ref 12–16)
INR BLD: 1
LYMPHOCYTES ABSOLUTE: 1.4 K/UL (ref 1–4.8)
LYMPHOCYTES RELATIVE PERCENT: 24.8 %
MCH RBC QN AUTO: 27.7 PG (ref 27–31.3)
MCHC RBC AUTO-ENTMCNC: 32.9 % (ref 33–37)
MCV RBC AUTO: 84.1 FL (ref 79.4–94.8)
MONOCYTES ABSOLUTE: 0.7 K/UL (ref 0.2–0.8)
MONOCYTES RELATIVE PERCENT: 12.8 %
NEUTROPHILS ABSOLUTE: 3.3 K/UL (ref 1.4–6.5)
NEUTROPHILS RELATIVE PERCENT: 58.9 %
PDW BLD-RTO: 15.5 % (ref 11.5–14.5)
PLATELET # BLD: 224 K/UL (ref 130–400)
POTASSIUM SERPL-SCNC: 4.1 MEQ/L (ref 3.4–4.9)
PROTHROMBIN TIME: 13.1 SEC (ref 12.3–14.9)
RBC # BLD: 4.38 M/UL (ref 4.2–5.4)
SODIUM BLD-SCNC: 138 MEQ/L (ref 135–144)
TOTAL PROTEIN: 6.3 G/DL (ref 6.3–8)
WBC # BLD: 5.7 K/UL (ref 4.8–10.8)

## 2023-02-11 PROCEDURE — 85025 COMPLETE CBC W/AUTO DIFF WBC: CPT

## 2023-02-11 PROCEDURE — 80053 COMPREHEN METABOLIC PANEL: CPT

## 2023-02-11 PROCEDURE — 86900 BLOOD TYPING SEROLOGIC ABO: CPT

## 2023-02-11 PROCEDURE — 6830039000 HC L3 TRAUMA ALERT

## 2023-02-11 PROCEDURE — 6370000000 HC RX 637 (ALT 250 FOR IP): Performed by: STUDENT IN AN ORGANIZED HEALTH CARE EDUCATION/TRAINING PROGRAM

## 2023-02-11 PROCEDURE — 86850 RBC ANTIBODY SCREEN: CPT

## 2023-02-11 PROCEDURE — 82077 ASSAY SPEC XCP UR&BREATH IA: CPT

## 2023-02-11 PROCEDURE — 1210000000 HC MED SURG R&B

## 2023-02-11 PROCEDURE — 2580000003 HC RX 258: Performed by: SURGERY

## 2023-02-11 PROCEDURE — 85610 PROTHROMBIN TIME: CPT

## 2023-02-11 PROCEDURE — 72125 CT NECK SPINE W/O DYE: CPT

## 2023-02-11 PROCEDURE — 85730 THROMBOPLASTIN TIME PARTIAL: CPT

## 2023-02-11 PROCEDURE — 73502 X-RAY EXAM HIP UNI 2-3 VIEWS: CPT

## 2023-02-11 PROCEDURE — 70450 CT HEAD/BRAIN W/O DYE: CPT

## 2023-02-11 PROCEDURE — 99285 EMERGENCY DEPT VISIT HI MDM: CPT

## 2023-02-11 PROCEDURE — 36415 COLL VENOUS BLD VENIPUNCTURE: CPT

## 2023-02-11 PROCEDURE — 86901 BLOOD TYPING SEROLOGIC RH(D): CPT

## 2023-02-11 RX ORDER — 0.9 % SODIUM CHLORIDE 0.9 %
1000 INTRAVENOUS SOLUTION INTRAVENOUS ONCE
Status: COMPLETED | OUTPATIENT
Start: 2023-02-11 | End: 2023-02-11

## 2023-02-11 RX ORDER — ACETAMINOPHEN 500 MG
1000 TABLET ORAL
Status: COMPLETED | OUTPATIENT
Start: 2023-02-11 | End: 2023-02-11

## 2023-02-11 RX ORDER — ONDANSETRON 4 MG/1
4 TABLET, ORALLY DISINTEGRATING ORAL EVERY 8 HOURS PRN
Status: DISCONTINUED | OUTPATIENT
Start: 2023-02-11 | End: 2023-02-15 | Stop reason: HOSPADM

## 2023-02-11 RX ORDER — ACETAMINOPHEN 325 MG/1
650 TABLET ORAL EVERY 4 HOURS PRN
Status: DISCONTINUED | OUTPATIENT
Start: 2023-02-11 | End: 2023-02-12

## 2023-02-11 RX ORDER — ONDANSETRON 2 MG/ML
4 INJECTION INTRAMUSCULAR; INTRAVENOUS EVERY 6 HOURS PRN
Status: DISCONTINUED | OUTPATIENT
Start: 2023-02-11 | End: 2023-02-15 | Stop reason: HOSPADM

## 2023-02-11 RX ORDER — SODIUM CHLORIDE 9 MG/ML
INJECTION, SOLUTION INTRAVENOUS PRN
Status: DISCONTINUED | OUTPATIENT
Start: 2023-02-11 | End: 2023-02-15 | Stop reason: HOSPADM

## 2023-02-11 RX ORDER — SODIUM CHLORIDE 0.9 % (FLUSH) 0.9 %
5-40 SYRINGE (ML) INJECTION EVERY 12 HOURS SCHEDULED
Status: DISCONTINUED | OUTPATIENT
Start: 2023-02-11 | End: 2023-02-15 | Stop reason: HOSPADM

## 2023-02-11 RX ORDER — SODIUM CHLORIDE 0.9 % (FLUSH) 0.9 %
5-40 SYRINGE (ML) INJECTION PRN
Status: DISCONTINUED | OUTPATIENT
Start: 2023-02-11 | End: 2023-02-15 | Stop reason: HOSPADM

## 2023-02-11 RX ADMIN — ACETAMINOPHEN 1000 MG: 500 TABLET ORAL at 19:50

## 2023-02-11 RX ADMIN — SODIUM CHLORIDE 1000 ML: 9 INJECTION, SOLUTION INTRAVENOUS at 22:28

## 2023-02-11 ASSESSMENT — PAIN - FUNCTIONAL ASSESSMENT: PAIN_FUNCTIONAL_ASSESSMENT: NONE - DENIES PAIN

## 2023-02-11 NOTE — ED TRIAGE NOTES
Patient arrived via EMS from [address] [place] [address] on floor complaining of Hip pain. Fall was unwitnessed. Patient denies any LOC, head or back pain,  Patient has dementia, at her baseline.

## 2023-02-11 NOTE — ED PROVIDER NOTES
[address]  eMERGENCYdEPARTMENT eNCOUnter      Pt Name: Stana Apley  MRN: [id number]  [place] 1940  Date of evaluation: 2/11/2023  Provider:Luis Alberto Willams PA-C    CHIEF COMPLAINT           HPI  Stana Apley is a 80 y.o. female per chart review has a h/o dementia presents after an unwitnessed fall. Nursing home reports she complained of sudden onset, moderate, sharp pain to the R hip after she was found down near a step off. Pt has been seen here for multiple falls in the past few months. Unable to answer questions due to dementia severity. ROS  Review of Systems   Unable to perform ROS: Dementia     Except as noted above the remainder of the review of systems was reviewed and negative.        PAST MEDICAL HISTORY     Past Medical History:   Diagnosis Date    Asymptomatic varicose veins of both lower extremities     Dementia with behavioral disturbance     Elevated blood pressure reading     Full dentures     History of hip fracture 2012    Small vessel disease, cerebrovascular          SURGICAL HISTORY       Past Surgical History:   Procedure Laterality Date    HIP FRACTURE SURGERY  2012         CURRENTMEDICATIONS       Previous Medications    ACETAMINOPHEN (TYLENOL) 325 MG TABLET    Take 2 tablets by mouth every 4 hours as needed for Pain    DONEPEZIL (ARICEPT) 5 MG TABLET    Take 5 mg by mouth daily     KETOCONAZOLE (NIZORAL) 2 % CREAM    Apply topically 2 times daily Apply topically daily L foot    LACTOBACILLUS (ACIDOPHILUS) CAPS CAPSULE    Take 1 capsule by mouth 2 times daily    QUETIAPINE (SEROQUEL) 25 MG TABLET    Take 0.5 tablets by mouth daily    VITAMIN D3 (CHOLECALCIFEROL) 25 MCG (1000 UT) TABS TABLET    Take 1,000 Units by mouth daily       ALLERGIES     Bactrim [sulfamethoxazole-trimethoprim], Bee venom, Ceftin [cefuroxime], and Wasp venom    FAMILY HISTORY       Family History   Family history unknown: Yes          SOCIAL HISTORY       Social History     Socioeconomic History    Marital status:    Tobacco Use    Smoking status: Unknown    Smokeless tobacco: Never   Vaping Use    Vaping Use: Never used   Social History Narrative    No children    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 HENT:      Head: Normocephalic and atraumatic. Right Ear: External ear normal.      Left Ear: External ear normal.      Nose: Nose normal.      Mouth/Throat:      Mouth: Mucous membranes are moist.   Eyes:      Extraocular Movements: Extraocular movements intact. Conjunctiva/sclera: Conjunctivae normal.   Cardiovascular:      Rate and Rhythm: Normal rate and regular rhythm. Heart sounds: Normal heart sounds. Pulmonary:      Effort: Pulmonary effort is normal.      Breath sounds: Normal breath sounds. No stridor. No wheezing or rhonchi. Abdominal:      Palpations: Abdomen is soft. Tenderness: There is no abdominal tenderness. Musculoskeletal:         General: Normal range of motion. Cervical back: Normal range of motion and neck supple. No tenderness. Legs:       Comments: Pain in L hip with hip flexion, no groin pain, no leg shortening or external rotation although exam is difficult due to pt's mental state and lack of cooperation. Pedal pulses and patellar reflexes equal bilaterally. No low back pain or step offs on palpation. Skin:     General: Skin is warm and dry. Neurological:      General: No focal deficit present.       Mental Status: She is alert and oriented to person, place, and time. Psychiatric:         Mood and Affect: Mood normal.         Behavior: Behavior normal.         MDM  This is an 79 y/o female presenting with hip pain. Pt is afebrile and HD stable. +pain on L hip (different from reported hip). XR shows L femoral neck fracture. Labs non significant. CT head negative for bleed, CT cervical negative for fracture. Spoke with the POA who state pt is normally very active and lives a happy life, they've decided she would want surgery to repair hip. Pt is DNR CC. Spoke with Dr. Barrington Leventhal who will do surgery in the AM. Pt admitted to trauma in stable condition. FINAL IMPRESSION      1. Closed fracture of left hip, initial encounter ([facility]    2.  Fall, initial encounter          DISPOSITION/PLAN   DISPOSITION Admitted 02/11/2023 10:06:09 PM        DISCHARGE MEDICATIONS:  [unfilled]         Marco Wetzel PA-C(electronically signed)  Attending Emergency Physician           Marco Wetzel PA-C  02/12/23 8525

## 2023-02-12 ENCOUNTER — APPOINTMENT (OUTPATIENT)
Dept: GENERAL RADIOLOGY | Age: 83
DRG: 522 | End: 2023-02-12
Payer: MEDICARE

## 2023-02-12 ENCOUNTER — ANESTHESIA EVENT (OUTPATIENT)
Dept: OPERATING ROOM | Age: 83
End: 2023-02-12
Payer: MEDICARE

## 2023-02-12 ENCOUNTER — ANESTHESIA (OUTPATIENT)
Dept: OPERATING ROOM | Age: 83
End: 2023-02-12
Payer: MEDICARE

## 2023-02-12 PROBLEM — M85.852 OSTEOPENIA OF LEFT THIGH: Status: ACTIVE | Noted: 2023-02-12
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
## 2023-02-12 LAB
ABO/RH: NORMAL
ANTIBODY SCREEN: NORMAL

## 2023-02-12 PROCEDURE — 1210000000 HC MED SURG R&B

## 2023-02-12 PROCEDURE — C1776 JOINT DEVICE (IMPLANTABLE): HCPCS | Performed by: STUDENT IN AN ORGANIZED HEALTH CARE EDUCATION/TRAINING PROGRAM

## 2023-02-12 PROCEDURE — 3700000001 HC ADD 15 MINUTES (ANESTHESIA): Performed by: STUDENT IN AN ORGANIZED HEALTH CARE EDUCATION/TRAINING PROGRAM

## 2023-02-12 PROCEDURE — 6360000002 HC RX W HCPCS: Performed by: ANESTHESIOLOGY

## 2023-02-12 PROCEDURE — 3600000004 HC SURGERY LEVEL 4 BASE: Performed by: STUDENT IN AN ORGANIZED HEALTH CARE EDUCATION/TRAINING PROGRAM

## 2023-02-12 PROCEDURE — 2580000003 HC RX 258: Performed by: STUDENT IN AN ORGANIZED HEALTH CARE EDUCATION/TRAINING PROGRAM

## 2023-02-12 PROCEDURE — 6370000000 HC RX 637 (ALT 250 FOR IP): Performed by: STUDENT IN AN ORGANIZED HEALTH CARE EDUCATION/TRAINING PROGRAM

## 2023-02-12 PROCEDURE — 2580000003 HC RX 258: Performed by: ANESTHESIOLOGY

## 2023-02-12 PROCEDURE — 0SRS0J9 REPLACEMENT OF LEFT HIP JOINT, FEMORAL SURFACE WITH SYNTHETIC SUBSTITUTE, CEMENTED, OPEN APPROACH: ICD-10-PCS | Performed by: STUDENT IN AN ORGANIZED HEALTH CARE EDUCATION/TRAINING PROGRAM

## 2023-02-12 PROCEDURE — 27495 REINFORCE THIGH: CPT | Performed by: STUDENT IN AN ORGANIZED HEALTH CARE EDUCATION/TRAINING PROGRAM

## 2023-02-12 PROCEDURE — 3E0T3BZ INTRODUCTION OF ANESTHETIC AGENT INTO PERIPHERAL NERVES AND PLEXI, PERCUTANEOUS APPROACH: ICD-10-PCS | Performed by: STUDENT IN AN ORGANIZED HEALTH CARE EDUCATION/TRAINING PROGRAM

## 2023-02-12 PROCEDURE — 99223 1ST HOSP IP/OBS HIGH 75: CPT | Performed by: STUDENT IN AN ORGANIZED HEALTH CARE EDUCATION/TRAINING PROGRAM

## 2023-02-12 PROCEDURE — 2500000003 HC RX 250 WO HCPCS: Performed by: ANESTHESIOLOGY

## 2023-02-12 PROCEDURE — 27236 TREAT THIGH FRACTURE: CPT | Performed by: STUDENT IN AN ORGANIZED HEALTH CARE EDUCATION/TRAINING PROGRAM

## 2023-02-12 PROCEDURE — 2500000003 HC RX 250 WO HCPCS: Performed by: STUDENT IN AN ORGANIZED HEALTH CARE EDUCATION/TRAINING PROGRAM

## 2023-02-12 PROCEDURE — 7100000001 HC PACU RECOVERY - ADDTL 15 MIN: Performed by: STUDENT IN AN ORGANIZED HEALTH CARE EDUCATION/TRAINING PROGRAM

## 2023-02-12 PROCEDURE — 3700000000 HC ANESTHESIA ATTENDED CARE: Performed by: STUDENT IN AN ORGANIZED HEALTH CARE EDUCATION/TRAINING PROGRAM

## 2023-02-12 PROCEDURE — 2709999900 HC NON-CHARGEABLE SUPPLY: Performed by: STUDENT IN AN ORGANIZED HEALTH CARE EDUCATION/TRAINING PROGRAM

## 2023-02-12 PROCEDURE — 3600000014 HC SURGERY LEVEL 4 ADDTL 15MIN: Performed by: STUDENT IN AN ORGANIZED HEALTH CARE EDUCATION/TRAINING PROGRAM

## 2023-02-12 PROCEDURE — 2580000003 HC RX 258: Performed by: SURGERY

## 2023-02-12 PROCEDURE — 7100000000 HC PACU RECOVERY - FIRST 15 MIN: Performed by: STUDENT IN AN ORGANIZED HEALTH CARE EDUCATION/TRAINING PROGRAM

## 2023-02-12 PROCEDURE — 2780000010 HC IMPLANT OTHER: Performed by: STUDENT IN AN ORGANIZED HEALTH CARE EDUCATION/TRAINING PROGRAM

## 2023-02-12 PROCEDURE — 2720000010 HC SURG SUPPLY STERILE: Performed by: STUDENT IN AN ORGANIZED HEALTH CARE EDUCATION/TRAINING PROGRAM

## 2023-02-12 PROCEDURE — C1713 ANCHOR/SCREW BN/BN,TIS/BN: HCPCS | Performed by: STUDENT IN AN ORGANIZED HEALTH CARE EDUCATION/TRAINING PROGRAM

## 2023-02-12 PROCEDURE — 6360000002 HC RX W HCPCS: Performed by: STUDENT IN AN ORGANIZED HEALTH CARE EDUCATION/TRAINING PROGRAM

## 2023-02-12 PROCEDURE — 73501 X-RAY EXAM HIP UNI 1 VIEW: CPT

## 2023-02-12 PROCEDURE — 76942 ECHO GUIDE FOR BIOPSY: CPT | Performed by: ANESTHESIOLOGY

## 2023-02-12 DEVICE — CABLE SURG DIA1.7MM S STL HA CERCLAGE W/ CRMP 29880101S] DEPUY SYNTHES USA]: Type: IMPLANTABLE DEVICE | Site: HIP | Status: FUNCTIONAL

## 2023-02-12 DEVICE — 127 DEGREE CEMENTED HIP STEM
Type: IMPLANTABLE DEVICE | Site: HIP | Status: FUNCTIONAL
Brand: ACCOLADE

## 2023-02-12 DEVICE — CEMENT BNE 40 GM RADIOPAQUE BA SIMPLEX P: Type: IMPLANTABLE DEVICE | Site: HIP | Status: FUNCTIONAL

## 2023-02-12 DEVICE — DISTAL SPACER
Type: IMPLANTABLE DEVICE | Site: HIP | Status: FUNCTIONAL
Brand: ACCOLADE

## 2023-02-12 DEVICE — BIPOLAR COMPONENT
Type: IMPLANTABLE DEVICE | Site: HIP | Status: FUNCTIONAL
Brand: UHR

## 2023-02-12 DEVICE — V40 FEMORAL HEAD
Type: IMPLANTABLE DEVICE | Site: HIP | Status: FUNCTIONAL
Brand: V40 HEAD

## 2023-02-12 DEVICE — COMPONENT TOT HIP CAPPED BPLR UPLR PRSS FT STEM H5STRYKER] STRYKER CORP]: Type: IMPLANTABLE DEVICE | Site: HIP | Status: FUNCTIONAL

## 2023-02-12 RX ORDER — OXYCODONE HYDROCHLORIDE 5 MG/1
2.5 TABLET ORAL EVERY 4 HOURS PRN
Status: DISCONTINUED | OUTPATIENT
Start: 2023-02-12 | End: 2023-02-15 | Stop reason: HOSPADM

## 2023-02-12 RX ORDER — LIDOCAINE HYDROCHLORIDE AND EPINEPHRINE BITARTRATE 20; .01 MG/ML; MG/ML
INJECTION, SOLUTION SUBCUTANEOUS PRN
Status: DISCONTINUED | OUTPATIENT
Start: 2023-02-12 | End: 2023-02-12 | Stop reason: SDUPTHER

## 2023-02-12 RX ORDER — VANCOMYCIN HYDROCHLORIDE 1 G/20ML
INJECTION, POWDER, LYOPHILIZED, FOR SOLUTION INTRAVENOUS PRN
Status: DISCONTINUED | OUTPATIENT
Start: 2023-02-12 | End: 2023-02-12 | Stop reason: HOSPADM

## 2023-02-12 RX ORDER — VANCOMYCIN HYDROCHLORIDE 1 G/20ML
INJECTION, POWDER, LYOPHILIZED, FOR SOLUTION INTRAVENOUS PRN
Status: DISCONTINUED | OUTPATIENT
Start: 2023-02-12 | End: 2023-02-12 | Stop reason: SDUPTHER

## 2023-02-12 RX ORDER — CLINDAMYCIN PHOSPHATE 150 MG/ML
INJECTION, SOLUTION, CONCENTRATE INTRAVENOUS PRN
Status: DISCONTINUED | OUTPATIENT
Start: 2023-02-12 | End: 2023-02-12 | Stop reason: SDUPTHER

## 2023-02-12 RX ORDER — MAGNESIUM HYDROXIDE 1200 MG/15ML
LIQUID ORAL CONTINUOUS PRN
Status: DISCONTINUED | OUTPATIENT
Start: 2023-02-12 | End: 2023-02-12 | Stop reason: HOSPADM

## 2023-02-12 RX ORDER — CLINDAMYCIN PHOSPHATE 900 MG/50ML
900 INJECTION INTRAVENOUS EVERY 8 HOURS
Status: DISPENSED | OUTPATIENT
Start: 2023-02-12 | End: 2023-02-13

## 2023-02-12 RX ORDER — POLYETHYLENE GLYCOL 3350 17 G/17G
17 POWDER, FOR SOLUTION ORAL DAILY
Status: DISCONTINUED | OUTPATIENT
Start: 2023-02-12 | End: 2023-02-15 | Stop reason: HOSPADM

## 2023-02-12 RX ORDER — VITAMIN B COMPLEX
1000 TABLET ORAL DAILY
Status: DISCONTINUED | OUTPATIENT
Start: 2023-02-12 | End: 2023-02-15 | Stop reason: HOSPADM

## 2023-02-12 RX ORDER — OXYCODONE HYDROCHLORIDE 5 MG/1
5 TABLET ORAL EVERY 4 HOURS PRN
Status: DISCONTINUED | OUTPATIENT
Start: 2023-02-12 | End: 2023-02-15 | Stop reason: HOSPADM

## 2023-02-12 RX ORDER — SODIUM CHLORIDE 9 MG/ML
INJECTION, SOLUTION INTRAVENOUS PRN
Status: DISCONTINUED | OUTPATIENT
Start: 2023-02-12 | End: 2023-02-12 | Stop reason: HOSPADM

## 2023-02-12 RX ORDER — KETOCONAZOLE 20 MG/G
CREAM TOPICAL 2 TIMES DAILY
Status: DISCONTINUED | OUTPATIENT
Start: 2023-02-12 | End: 2023-02-15 | Stop reason: HOSPADM

## 2023-02-12 RX ORDER — DONEPEZIL HYDROCHLORIDE 5 MG/1
5 TABLET, FILM COATED ORAL DAILY
Status: DISCONTINUED | OUTPATIENT
Start: 2023-02-12 | End: 2023-02-15 | Stop reason: HOSPADM

## 2023-02-12 RX ORDER — PROPOFOL 10 MG/ML
INJECTION, EMULSION INTRAVENOUS PRN
Status: DISCONTINUED | OUTPATIENT
Start: 2023-02-12 | End: 2023-02-12 | Stop reason: SDUPTHER

## 2023-02-12 RX ORDER — SODIUM CHLORIDE 9 MG/ML
INJECTION, SOLUTION INTRAVENOUS CONTINUOUS PRN
Status: DISCONTINUED | OUTPATIENT
Start: 2023-02-12 | End: 2023-02-12 | Stop reason: SDUPTHER

## 2023-02-12 RX ORDER — CLINDAMYCIN PHOSPHATE 900 MG/50ML
900 INJECTION INTRAVENOUS ONCE
Status: COMPLETED | OUTPATIENT
Start: 2023-02-12 | End: 2023-02-12

## 2023-02-12 RX ORDER — ACETAMINOPHEN 325 MG/1
650 TABLET ORAL EVERY 6 HOURS
Status: DISCONTINUED | OUTPATIENT
Start: 2023-02-12 | End: 2023-02-15 | Stop reason: HOSPADM

## 2023-02-12 RX ORDER — GENTAMICIN SULFATE 40 MG/ML
INJECTION, SOLUTION INTRAMUSCULAR; INTRAVENOUS PRN
Status: DISCONTINUED | OUTPATIENT
Start: 2023-02-12 | End: 2023-02-12 | Stop reason: HOSPADM

## 2023-02-12 RX ORDER — FENTANYL CITRATE 0.05 MG/ML
50 INJECTION, SOLUTION INTRAMUSCULAR; INTRAVENOUS EVERY 5 MIN PRN
Status: DISCONTINUED | OUTPATIENT
Start: 2023-02-12 | End: 2023-02-12 | Stop reason: HOSPADM

## 2023-02-12 RX ORDER — FENTANYL CITRATE 0.05 MG/ML
25 INJECTION, SOLUTION INTRAMUSCULAR; INTRAVENOUS EVERY 5 MIN PRN
Status: DISCONTINUED | OUTPATIENT
Start: 2023-02-12 | End: 2023-02-12 | Stop reason: HOSPADM

## 2023-02-12 RX ORDER — L. ACIDOPHILUS/L.BULGARICUS 1MM CELL
1 TABLET ORAL 2 TIMES DAILY
Status: DISCONTINUED | OUTPATIENT
Start: 2023-02-12 | End: 2023-02-15 | Stop reason: HOSPADM

## 2023-02-12 RX ORDER — SODIUM CHLORIDE 0.9 % (FLUSH) 0.9 %
5-40 SYRINGE (ML) INJECTION PRN
Status: DISCONTINUED | OUTPATIENT
Start: 2023-02-12 | End: 2023-02-12 | Stop reason: HOSPADM

## 2023-02-12 RX ORDER — SODIUM CHLORIDE 0.9 % (FLUSH) 0.9 %
5-40 SYRINGE (ML) INJECTION EVERY 12 HOURS SCHEDULED
Status: DISCONTINUED | OUTPATIENT
Start: 2023-02-12 | End: 2023-02-12 | Stop reason: HOSPADM

## 2023-02-12 RX ORDER — QUETIAPINE FUMARATE 25 MG/1
12.5 TABLET, FILM COATED ORAL DAILY
Status: DISCONTINUED | OUTPATIENT
Start: 2023-02-12 | End: 2023-02-15 | Stop reason: HOSPADM

## 2023-02-12 RX ORDER — SENNA AND DOCUSATE SODIUM 50; 8.6 MG/1; MG/1
2 TABLET, FILM COATED ORAL 2 TIMES DAILY
Status: DISCONTINUED | OUTPATIENT
Start: 2023-02-12 | End: 2023-02-15 | Stop reason: HOSPADM

## 2023-02-12 RX ORDER — ROPIVACAINE HYDROCHLORIDE 5 MG/ML
INJECTION, SOLUTION EPIDURAL; INFILTRATION; PERINEURAL PRN
Status: DISCONTINUED | OUTPATIENT
Start: 2023-02-12 | End: 2023-02-12 | Stop reason: SDUPTHER

## 2023-02-12 RX ORDER — ONDANSETRON 2 MG/ML
4 INJECTION INTRAMUSCULAR; INTRAVENOUS
Status: DISCONTINUED | OUTPATIENT
Start: 2023-02-12 | End: 2023-02-12 | Stop reason: HOSPADM

## 2023-02-12 RX ADMIN — DONEPEZIL HYDROCHLORIDE 5 MG: 5 TABLET, FILM COATED ORAL at 17:25

## 2023-02-12 RX ADMIN — ROPIVACAINE HYDROCHLORIDE 15 ML: 5 INJECTION, SOLUTION EPIDURAL; INFILTRATION; PERINEURAL at 07:58

## 2023-02-12 RX ADMIN — PROPOFOL 40 MG: 10 INJECTION, EMULSION INTRAVENOUS at 08:26

## 2023-02-12 RX ADMIN — CLINDAMYCIN PHOSPHATE 900 MG: 900 INJECTION, SOLUTION INTRAVENOUS at 17:23

## 2023-02-12 RX ADMIN — SODIUM CHLORIDE: 9 INJECTION, SOLUTION INTRAVENOUS at 08:20

## 2023-02-12 RX ADMIN — ACETAMINOPHEN 650 MG: 325 TABLET ORAL at 17:24

## 2023-02-12 RX ADMIN — QUETIAPINE FUMARATE 12.5 MG: 25 TABLET ORAL at 17:24

## 2023-02-12 RX ADMIN — TRANEXAMIC ACID 1000 MG: 1 INJECTION, SOLUTION INTRAVENOUS at 11:07

## 2023-02-12 RX ADMIN — LIDOCAINE HYDROCHLORIDE,EPINEPHRINE BITARTRATE 10 ML: 20; .01 INJECTION, SOLUTION INFILTRATION; PERINEURAL at 07:58

## 2023-02-12 RX ADMIN — KETOCONAZOLE: 20 CREAM TOPICAL at 21:00

## 2023-02-12 RX ADMIN — SODIUM CHLORIDE, PRESERVATIVE FREE 10 ML: 5 INJECTION INTRAVENOUS at 23:28

## 2023-02-12 RX ADMIN — VANCOMYCIN HYDROCHLORIDE 1000 MG: 1 INJECTION, POWDER, LYOPHILIZED, FOR SOLUTION INTRAVENOUS at 07:56

## 2023-02-12 RX ADMIN — PROPOFOL 100 MCG/KG/MIN: 10 INJECTION, EMULSION INTRAVENOUS at 08:29

## 2023-02-12 RX ADMIN — ACETAMINOPHEN 650 MG: 325 TABLET ORAL at 23:37

## 2023-02-12 RX ADMIN — Medication 1 TABLET: at 23:37

## 2023-02-12 RX ADMIN — CLINDAMYCIN PHOSPHATE 900 MG: 150 INJECTION, SOLUTION, CONCENTRATE INTRAVENOUS at 08:26

## 2023-02-12 RX ADMIN — Medication 1000 UNITS: at 17:24

## 2023-02-12 RX ADMIN — SENNOSIDES AND DOCUSATE SODIUM 2 TABLET: 50; 8.6 TABLET ORAL at 23:38

## 2023-02-12 RX ADMIN — POLYETHYLENE GLYCOL 3350 17 G: 17 POWDER, FOR SOLUTION ORAL at 17:24

## 2023-02-12 RX ADMIN — SODIUM CHLORIDE: 9 INJECTION, SOLUTION INTRAVENOUS at 07:55

## 2023-02-12 RX ADMIN — PROPOFOL 20 MG: 10 INJECTION, EMULSION INTRAVENOUS at 08:46

## 2023-02-12 RX ADMIN — VANCOMYCIN HYDROCHLORIDE 1000 MG: 1 INJECTION, POWDER, LYOPHILIZED, FOR SOLUTION INTRAVENOUS at 08:20

## 2023-02-12 ASSESSMENT — PAIN DESCRIPTION - LOCATION: LOCATION: HIP

## 2023-02-12 ASSESSMENT — PAIN DESCRIPTION - ORIENTATION: ORIENTATION: LEFT

## 2023-02-12 ASSESSMENT — PAIN SCALES - GENERAL
PAINLEVEL_OUTOF10: 8
PAINLEVEL_OUTOF10: 0

## 2023-02-12 ASSESSMENT — PAIN DESCRIPTION - DESCRIPTORS: DESCRIPTORS: ACHING;DISCOMFORT;THROBBING

## 2023-02-12 NOTE — ED NOTES
Called and spoke with Jameel Bernard about pt's procedure. Family will arrive around 12.       [facility]  02/12/23 2062

## 2023-02-12 NOTE — ED NOTES
Pt's bed changed and new brief placed on. Pt placed into a new gown. Pt incontinent of stool and urine. Pt given dynahex bath as well.       Chandana Herrera  02/12/23 1761

## 2023-02-12 NOTE — ANESTHESIA POSTPROCEDURE EVALUATION
Department of Anesthesiology  Postprocedure Note    Patient: Yusef Agrawal  MRN: [id number]  YOB: 1940  Date of evaluation: 2/12/2023      Procedure Summary     Date: 02/12/23 Room / Location: [address]    Anesthesia Start: 0820 Anesthesia Stop: 0903    Procedure: LEFT HIP HEMIARTHROPLASTY (Left: Hip) Diagnosis:       Fracture      (Fracture [T14. 8XXA])    Surgeons: Sydnee Tran MD Responsible Provider: Mariah Rodriguez MD    Anesthesia Type: MAC, regional ASA Status: 3          Anesthesia Type: No value filed.     Jeffy Phase I:      Jeffy Phase II:        Anesthesia Post Evaluation    Patient location during evaluation: PACU  Patient participation: complete - patient participated  Level of consciousness: sleepy but conscious  Pain score: 0  Airway patency: patent  Nausea & Vomiting: no vomiting and no nausea  Complications: no  Cardiovascular status: hemodynamically stable  Respiratory status: face mask  Hydration status: stable  Multimodal analgesia pain management approach

## 2023-02-12 NOTE — GROUP NOTE
Group Therapy Note    Date: 2023    Group Start Time:   Group End Time:   Group Topic: Wrap-Up    [facility] 3W I    Mateusz Smiley RN        Group Therapy Note    Attendees:          Patient's Goal:  ***    Notes:  ***    Status After Intervention:  {Status After Intervention:510467490}    Participation Level: {Participation Level:718112146}    Participation Quality: {[facility] PARTICIPATION QUALITY:259792426}      Speech:  {[facility] CD_SPEECH:02004}      Thought Process/Content: {Thought Process/Content:234870534}      Affective Functioning: {Affective Functionin}      Mood: {Mood:449712250}      Level of consciousness:  {Level of consciousness:501306610}      Response to Learnin Tia [facility] Responses to Learnin}      Endings: {[facility] Endings:11591}    Modes of Intervention: {[facility] Modes of Intervention:370575474}      Discipline Responsible: Ghulam [facility] Multidisciplinary:[id number]}      Signature:  Mateusz Smiley RN

## 2023-02-12 NOTE — H&P
Trauma Surgery Service History & Physical     Information Source  The history is provided by the niece    CC: Fall    History: Patient is an 79 y/o female with PMH significant for dementia who presented overnight as a Level II trauma following ground level fall. PMH -   Past Medical History:   Diagnosis Date    Asymptomatic varicose veins of both lower extremities     Dementia with behavioral disturbance     Elevated blood pressure reading     Full dentures     History of hip fracture 2012    Small vessel disease, cerebrovascular        PSH -   Past Surgical History:   Procedure Laterality Date    HIP FRACTURE SURGERY  2012 [address] - denies EtOH, tobacco, and illicts  FH - denies history of CAD, HTN, DM, bleeding/clotting disorders    ROS:    General: alert and oriented to person, NAD  Respiratory: denies SOB, cough, or wheezing  Cardiovascular: denies CP, palpitations, or dizziness  Gastrointestinal: denies nausea, constipation or obstipation, or abdominal pain  Genitourinary: no frequency or dysuria, hematuria, or frequency   Musculoskeletal: no weakness, no excessive stiffness, or tenderness  Hematology: no dizziness, no prolonged bruising, no excessive bleeding  ID: no chronic cough, weight loss, night sweats, rigors  Endocrine: no polyuria, polydypsia, heat or cold intolerance  Neuro: no weakness, paraplegia, dysphagia or aphasia  Skin: no rashes, abscesses, or breakdown, or abrasions   Psych: no abnormalities in thinking, paranoia, hallucinations    Physical Exam  Primary Survey  Airway: patent. Breathing: spontaneous Trachea is midline. Chest wall is WDL. Circulation: Color is HDS   Disability/GCS: GCS 14 (E4, V4, M6). Environment: Environment amount of exposure: temp >36 C. Secondary Survey  Head/Scalp: There is no head laceration with/without bleeding  Eyes: Negative for injury. Ears: Negative for injury. no evidence of hemotympanum on the right. no evidence of hemotympanum on left. Nose: Negative for injury. Midface: Midface is stable. Mouth: Negative for injury. Neck: Trachea is midline. Negative for injury. Respiratory: negative for injury. No distress noted with respirations. Cardiac: rhythm sinus. Gastrointestinal: non-tender to palpation Abdomen injuries: Negative for injury. Pelvis:  stable. Perineum Assessment: deferred. Musculoskeletal Assessment: weakness and pain with ROM of LLE  Spine: Normal.     Radiology  CT Head: No acute intracranial injury noted  CT C-spine: Negative for acute fx or misalignment  Left Hip XR: left mildly displaced subcapital femoral neck fx    Impression:  Argelia Pandey is an 79 y/o female who presented on 2/11/23 following ground level fall with resulting left hip fx. Patient admitted to trauma service, plan for OR today with Ortho. Liliana Wang.  Miguel Angel Castorena, [address] [facility] Surgery

## 2023-02-12 NOTE — CONSULTS
History and Physical  Patient: Deyvi Cheung [facility]  Unit/Bed:04/04  YOB: 1940  MRN: [id number]  Acct: [de-identified]   Admitting Diagnosis: [facility]osed left [facility]  Admit Date:  2/11/2023  Hospital Day: 1      Chief Complaint:   Fall with left hip pain    History of Present Illness:  Patient presented overnight to [facility] ED after unwitness fall. She has severe dementia and lives at a care facility. She is unable to effectively participate in the medical interview secondary to her dementia. Her POA is at bedside this morning and provides additional history. She is otherwise active, likes to dance. Has had multiple falls in the last year. Uses a walker. ROS unable to be obtained secondary to dementia.     Allergies   Allergen Reactions    Bactrim [Sulfamethoxazole-Trimethoprim]     Bee Venom     Ceftin [Cefuroxime]     Wasp Venom        Current Facility-Administered Medications   Medication Dose Route Frequency Provider Last Rate Last Admin    sodium chloride flush 0.9 % injection 5-40 mL  5-40 mL IntraVENous 2 times per day Vahid Russo MD        sodium chloride flush 0.9 % injection 5-40 mL  5-40 mL IntraVENous PRN Vahid Russo MD        0.9 % sodium chloride infusion   IntraVENous PRN Vahid Russo MD        acetaminophen (TYLENOL) tablet 650 mg  650 mg Oral Q4H PRN Vahid Russo MD        ondansetron (ZOFRAN-ODT) disintegrating tablet 4 mg  4 mg Oral Q8H PRN Vahid Russo MD        Or    ondansetron [facility]) injection 4 mg  4 mg IntraVENous Q6H PRN Vahid Russo MD         Current Outpatient Medications   Medication Sig Dispense Refill    acetaminophen (TYLENOL) 325 MG tablet Take 2 tablets by mouth every 4 hours as needed for Pain 120 tablet 0    Lactobacillus (ACIDOPHILUS) CAPS capsule Take 1 capsule by mouth 2 times daily      vitamin D3 (CHOLECALCIFEROL) 25 MCG (1000 UT) TABS tablet Take 1,000 Units by mouth daily      ketoconazole (NIZORAL) 2 % cream Apply topically 2 times daily Apply topically daily L foot      donepezil (ARICEPT) 5 MG tablet Take 5 mg by mouth daily       QUEtiapine (SEROQUEL) 25 MG tablet Take 0.5 tablets by mouth daily 30 tablet 0       PMHx:  Past Medical History:   Diagnosis Date    Asymptomatic varicose veins of both lower extremities     Dementia with behavioral disturbance     Elevated blood pressure reading     Full dentures     History of hip fracture 2012    Small vessel disease, cerebrovascular        PSHx:  Past Surgical History:   Procedure Laterality Date    HIP FRACTURE SURGERY  2012       Social Hx:  Social History     Socioeconomic History    Marital status:    Tobacco Use    Smoking status: Unknown    Smokeless tobacco: Never   Vaping Use    Vaping Use: Never used   Social History Narrative    No children    States she has worked in [employer] in [address] dementia with behavior problems in 2020    Resides at [facility] living [facility]     Social Determinants of Health     Physical Activity: Insufficiently Active    Days of Exercise per Week: 2 days    Minutes of Exercise per Session: 30 min       Family Hx:  Family History   Family history unknown: Yes       Review ofSystems:   Review of Systems ROS unable to be obtained secondary to dementia.        Physical Examination:    BP (!) 155/74   Pulse 93   Temp 98.6 °F (37 °C) (Axillary)   Resp 20   Ht 5' 2\" (1.575 m)   Wt 130 lb (59 kg)   SpO2 92%   BMI 23.78 kg/m²    Physical Exam   Exam limited due to baseline dementia/baseline altered mental status  Nonlabored breathing  LLE:   Extremity exam   Skin intact  Tender to palpation left hip and positive log roll sign  Distally shows intact motor function EHL/FHL/TA/GA, sensation grossly intact but again limited assessment due to baseline mental status  Intact distal pulses    Remainder of extremities show no significant bruising, no tenderness to palpation, and no pain with passive ROM.      LABS:  CBC:   Lab Results   Component Value Date/Time    WBC 5.7 02/11/2023 06:00 PM    RBC 4.38 02/11/2023 06:00 PM    HGB 12.1 02/11/2023 06:00 PM    HCT 36.8 02/11/2023 06:00 PM    MCV 84.1 02/11/2023 06:00 PM    MCH 27.7 02/11/2023 06:00 PM    MCHC 32.9 02/11/2023 06:00 PM    RDW 15.5 02/11/2023 06:00 PM     02/11/2023 06:00 PM    MPV 11.9 01/13/2021 12:00 AM     CBC with Differential:   Lab Results   Component Value Date/Time    WBC 5.7 02/11/2023 06:00 PM    RBC 4.38 02/11/2023 06:00 PM    HGB 12.1 02/11/2023 06:00 PM    HCT 36.8 02/11/2023 06:00 PM     02/11/2023 06:00 PM    MCV 84.1 02/11/2023 06:00 PM    MCH 27.7 02/11/2023 06:00 PM    MCHC 32.9 02/11/2023 06:00 PM    RDW 15.5 02/11/2023 06:00 PM    LYMPHOPCT 24.8 02/11/2023 06:00 PM    MONOPCT 12.8 02/11/2023 06:00 PM    EOSPCT 0.0 01/13/2021 12:00 AM    BASOPCT 0.7 02/11/2023 06:00 PM    MONOSABS 0.7 02/11/2023 06:00 PM    LYMPHSABS 1.4 02/11/2023 06:00 PM    EOSABS 0.2 02/11/2023 06:00 PM    BASOSABS 0.0 02/11/2023 06:00 PM     CMP:    Lab Results   Component Value Date/Time     02/11/2023 06:00 PM    K 4.1 02/11/2023 06:00 PM    K 3.9 01/22/2022 06:37 AM     02/11/2023 06:00 PM    CO2 24 02/11/2023 06:00 PM    BUN 9 02/11/2023 06:00 PM    CREATININE 0.77 02/11/2023 06:00 PM    GFRAA >60.0 01/22/2022 06:37 AM    LABGLOM >60.0 02/11/2023 06:00 PM    GLUCOSE 119 02/11/2023 06:00 PM    PROT 6.3 02/11/2023 06:00 PM    LABALBU 3.4 02/11/2023 06:00 PM    CALCIUM 8.6 02/11/2023 06:00 PM    BILITOT 0.3 02/11/2023 06:00 PM    ALKPHOS 93 02/11/2023 06:00 PM    AST 20 02/11/2023 06:00 PM    ALT 15 02/11/2023 06:00 PM     BMP:    Lab Results   Component Value Date/Time     02/11/2023 06:00 PM    K 4.1 02/11/2023 06:00 PM    K 3.9 01/22/2022 06:37 AM     02/11/2023 06:00 PM    CO2 24 02/11/2023 06:00 PM    BUN 9 02/11/2023 06:00 PM    LABALBU 3.4 02/11/2023 06:00 PM    CREATININE 0.77 02/11/2023 06:00 PM    CALCIUM 8.6 02/11/2023 06:00 PM    GFRAA >60.0 01/22/2022 06:37 AM    LABGLOM >60.0 02/11/2023 06:00 PM    GLUCOSE 119 02/11/2023 06:00 PM     Magnesium:  No results found for: MG  Troponin:    Lab Results   Component Value Date/Time    TROPONINI <0.10 01/13/2021 12:00 AM     No results for input(s): PROBNP in the last 72 hours. Recent Labs     02/11/23  1809   INR 1.0       RADIOLOGY:  CT HEAD WO CONTRAST    Result Date: 2/11/2023  EXAMINATION: CT OF THE HEAD WITHOUT CONTRAST  2/11/2023 6:44 pm TECHNIQUE: CT of the head was performed without the administration of intravenous contrast. Automated exposure control, iterative reconstruction, and/or weight based adjustment of the mA/kV was utilized to reduce the radiation dose to as low as reasonably achievable. COMPARISON: 12/26/2022 HISTORY: ORDERING SYSTEM PROVIDED HISTORY: unwitnessed fall, dementia, L hip pain TECHNOLOGIST PROVIDED HISTORY: Reason for exam:->unwitnessed fall, dementia, L hip pain Has a \"code stroke\" or \"stroke alert\" been called? ->No Decision Support Exception - unselect if not a suspected or confirmed emergency medical condition->Emergency Medical Condition (MA) What reading provider will be dictating this exam?->CRC FINDINGS: BRAIN/VENTRICLES: There is no acute intracranial hemorrhage, mass effect or midline shift. No abnormal extra-axial fluid collection. The gray-white differentiation is maintained without evidence of an acute infarct. There is prominence of the ventricles and sulci due to global parenchymal volume loss. There are nonspecific areas of hypoattenuation within the periventricular and subcortical white matter, which likely represent chronic microvascular ischemic change. ORBITS: The visualized portion of the orbits demonstrate no acute abnormality. SINUSES: The visualized paranasal sinuses and mastoid air cells demonstrate no acute abnormality. SOFT TISSUES/SKULL: No acute abnormality of the visualized skull or soft tissues.      No acute intracranial abnormality. Findings consistent with the chronic dementia with no change from the previous exam.     CT CERVICAL SPINE WO CONTRAST    Result Date: 2/11/2023  EXAMINATION: CT OF THE CERVICAL SPINE WITHOUT CONTRAST 2/11/2023 6:44 pm TECHNIQUE: CT of the cervical spine was performed without the administration of intravenous contrast. Multiplanar reformatted images are provided for review. Automated exposure control, iterative reconstruction, and/or weight based adjustment of the mA/kV was utilized to reduce the radiation dose to as low as reasonably achievable. COMPARISON: None. HISTORY: ORDERING SYSTEM PROVIDED HISTORY: unwitnessed fall, dementia, L hip pain TECHNOLOGIST PROVIDED HISTORY: Reason for exam:->unwitnessed fall, dementia, L hip pain Decision Support Exception - unselect if not a suspected or confirmed emergency medical condition->Emergency Medical Condition (MA) What reading provider will be dictating this exam?->CRC FINDINGS: BONES/ALIGNMENT: There is no acute fracture or traumatic malalignment. DEGENERATIVE CHANGES: There is mild reversal of the normal lordotic curvature with severe discogenic changes most pronounced at C5-6 and C6-7 levels without evidence of acute fracture or subluxation. SOFT TISSUES: There is no prevertebral soft tissue swelling. No acute abnormality of the cervical spine. XR HIP 2-3 VW W PELVIS LEFT    Result Date: 2/11/2023  EXAMINATION: ONE XRAY VIEW OF THE PELVIS AND TWO XRAY VIEWS LEFT HIP 2/11/2023 6:32 pm COMPARISON: None. HISTORY: ORDERING SYSTEM PROVIDED HISTORY: unwitnessed fall, dementia, L hip pain TECHNOLOGIST PROVIDED HISTORY: Reason for exam:->unwitnessed fall, dementia, L hip pain What reading provider will be dictating this exam?->CRC FINDINGS: AP pelvis with coned-down view of the hips demonstrate previous right total hip arthroplasty procedure.  The left hip demonstrates a mildly displaced subcapital femoral neck fracture on today's study with mild elevation of the proximal femur with lateral displacement. There remainder of the pelvis appears intact with diffuse osteopenia. Status post previous total right hip arthroplasty procedure. Acute right subcapital femoral neck fracture as described. On my review, she has a displaced left femoral neck fracture with diffuse osteopenia. The radiologist report says right femoral neck fracture but this is an error, its the left side that is fractured. Assessment:    Active Hospital Problems    Diagnosis Date Noted    Closed left hip fracture, initial encounter [facility]) [S72.002A] 02/11/2023     Priority: Medium     Displaced, left femoral neck fracture  Unwitnessed fall  Osteopenia  Hypoalbuminemia, likely malnourished    Plan:  I discussed with the POA( her niece) options including doing nothing at all, palliative care, non-operative treatment, surgical treatment options. Because of the fracture pattern and displacement would recommend hemiarthroplasty vs total hip replacement. I do not see a lot of arthritis on her xrays. My plan would be for left hip hemiarthroplasty, possible total hip if there are signs of arthritis or stability testing is not satisfactory with the hemiarthroplasty. It is uncommon, but sometimes additional stability can be achieved by increasing the combined anteversion by additional version to the cup in the setting of JUNAID. I also discussed that given her severe osteopenia I will plan for cemented hip stem and likely placement of a prophylactic cable around the femur to prevent iatrogenic fracture or subsequent post op periprosthetic fracture.  Risks and benefits of all these options have been discussed but specific to the planned left hip hemiarthroplasty vs total hip replacement, risks include but are not limited to the risk of infection, dislocation, cement implantation syndrome, risk of iatrogenic fracture, risk of damage to surrounding nerves, blood vessels, soft tissues, bleeding, blood clots, pulmonary embolism, myocardial infarction, stroke, risk of death among others. All questions have been answered. Informed consent has been obtained, patient marked. POA has decided to rescind the DNR for the operation.     Dr. Abi Sparrow        Electronically signed by Jade Rucker MD on 2/12/2023 at 7:10 AM

## 2023-02-12 NOTE — CARE COORDINATION
[facility] [place] Case Management Initial Discharge Assessment    If patient is discharged prior to next notation, then this note serves as note for discharge by case management. Met with Family to discuss discharge plan. PCP: Mckinley Saxena MD                                Date of Last Visit: RECENTLY    [facility] Patient: No        [facility] Notified: no    If no PCP, list provided? N/A    Discharge Planning    Living Arrangements: in assisted living facility dependent on nursing care [facility]     Who do you live with? ASSISTED LIVING     Who helps you with your care:  STAFF AT [address] [place] [address]    If lives at home:     Do you have any barriers navigating in your home? no    Patient can perform ADL? No    Current Services (outpatient and in home) :  From Rehab ([address] )    Dialysis: No    Is transportation available to get to your appointments? Yes    DME Equipment:  yes - WALKER     Respiratory equipment: None    Respiratory provider:  no     Pharmacy:  yes - [address] [address] with Medication Assistance Program?  No      Patient agreeable to [place] [id number]? [facility]   Patient agreeable to SNF/Rehab? TBD PENDING PROGRESS, PT/OT EVAL    Other discharge needs identified? N/A    Does Patient Have a High-Risk for Readmission Diagnosis (CHF, PN, MI, COPD)? No      Initial Discharge Plan? MET W/PT AND NIECE TO ASSESS NEEDS AND DISCUSS DISCHARGE PLAN WHICH IS TBD PENDING PROGRESS, POST OP PT/OT EVALS AND PT/CG CHOICE. CM/LSW TO FOLLOW AND ASSESS NEEDS. (Note: please see concurrent daily documentation for any updates after initial note).         Readmission Risk              Risk of Unplanned Readmission:  10         Electronically signed by Reddy Carey RN on 2/12/2023 at 2:33 PM

## 2023-02-12 NOTE — ANESTHESIA PROCEDURE NOTES
Peripheral Block    Patient location during procedure: pre-op  Reason for block: post-op pain management and at surgeon's request  Start time: 2/12/2023 7:55 AM  End time: 2/12/2023 8:00 AM  Staffing  Performed: anesthesiologist   Anesthesiologist: Thomas Joshua MD  Preanesthetic Checklist  Completed: patient identified, IV checked, site marked, risks and benefits discussed, surgical/procedural consents, equipment checked, pre-op evaluation, timeout performed, anesthesia consent given, oxygen available and monitors applied/VS acknowledged  Peripheral Block   Patient position: supine  Prep: ChloraPrep  Provider prep: mask and sterile gloves (Sterile probe cover)  Patient monitoring: cardiac monitor, continuous pulse ox, frequent blood pressure checks and IV access  Block type: PENG  Laterality: left  Injection technique: single-shot  Guidance: ultrasound guided  Local infiltration: ropivacaine and lidocaine (5 cc of NS added)  Infiltration strength: 0.5 %  Local infiltration: ropivacaine and lidocaine (5 cc of NS added)  Dose: 15 mLDose: 10 mL    Needle   Needle type: combined needle/nerve stimulator   Needle gauge: 21 G  Needle localization: anatomical landmarks and ultrasound guidance  Needle length: 10 cm  Assessment   Injection assessment: negative aspiration for heme, no paresthesia on injection and local visualized surrounding nerve on ultrasound  Paresthesia pain: immediately resolved  Slow fractionated injection: yes  Hemodynamics: stable  Real-time US image taken/store: yes    Additional Notes  Ultrasound image printed and saved in patient chart.     Sterile probe cover used

## 2023-02-12 NOTE — BRIEF OP NOTE
Brief Postoperative Note      Patient: John Gutierrez  YOB: 1940  MRN: [id number]    Date of Procedure: 2/12/2023    Pre-Op Diagnosis: Displaced left femoral neck fracture, history of fall, osteopenia, hypoalbuminemia suggesting malnourished state    Post-Op Diagnosis: Same       Procedure(s):  LEFT HIP HEMIARTHROPLASTY, PROPHYLACTIC STABILIZATION OF LEFT FEMUR    Surgeon(s): Rachel Saunders MD    Assistant:  First Assistant: Sam Shook    Anesthesia: MAC, regional    Estimated Blood Loss (mL): 558    Complications: None    Specimens:   * No specimens in log *    Implants:  Implant Name Type Inv. Item Serial No.  Lot No. LRB No. Used Action   CABLE SURG DIA1. 7MM S STL HA CERCLAGE W/ CRMP [id number]] DEPUY SYNTHES [facility]] - [id number]  CABLE SURG DIA1. 7MM S STL HA CERCLAGE W/ CRMP [id number]] DEPUY SYNTH[employer] [facility]]  DEPUY SYNTHES [place] [id number] Left 1 Implanted   CEMENT BNE 40 GM RADIOPAQUE BA SIMPLEX P - [id number]  CEMENT BNE 40 GM RADIOPAQUE BA SIMPLEX P  [facility] [id number] Left 2 Implanted   HEAD FEM OD45MM ID26MM HIP CO CHROM POLYETH BPLR CEMENTLESS - [id number]  HEAD FEM OD45MM ID26MM HIP CO CHROM POLYETH BPLR CEMENTLESS  [employer] [facility] [id number] Left 1 Implanted   HEAD FEM [id number] +4MM OFFSET HIP VIT POLYETH NK V40 TAPR - [id number]  HEAD FEM [id number] +4MM OFFSET HIP VIT POLYETH NK V40 TAPR  [employer] [facility] [id number] Left 1 Implanted   STEM FEM SZ 7 L158MM NK L40MM 52MM OFFSET 127DEG PROX DST - [id number]  STEM FEM SZ 7 L158MM NK L40MM 52MM OFFSET 127DEG PROX DST  [employer] [facility] [id number] Left 1 Implanted   SPACER FEM L [id number] DST HIP CO CHROM RNG STYL FOR SZ 6 AND - [id number]  SPACER FEM L [id number] DST HIP CO CHROM RNG STYL FOR SZ 6 AND  [employer] [facility] [id number] Left 1 Implanted         Drains: none    Findings: displaced left femoral neck fracture, osteopenia    Electronically signed by Rachel Saunders MD on 2/12/2023 at 10:28 AM

## 2023-02-12 NOTE — ANESTHESIA PRE PROCEDURE
Department of Anesthesiology  Preprocedure Note       Name:  Lola Martinez   Age:  80 y.o.  :  1940                                          MRN:  [id number]         Date:  2023      Surgeon: Edvin Bowman): Angelic Fair MD    Procedure: Procedure(s):  HIP HEMIARTHROPLASTY    Medications prior to admission:   Prior to Admission medications    Medication Sig Start Date End Date Taking?  Authorizing Provider   acetaminophen (TYLENOL) 325 MG tablet Take 2 tablets by mouth every 4 hours as needed for Pain 22  Catherine Jones PA-C   Lactobacillus (ACIDOPHILUS) CAPS capsule Take 1 capsule by mouth 2 times daily    Historical Provider, MD   vitamin D3 (CHOLECALCIFEROL) 25 MCG (1000 UT) TABS tablet Take 1,000 Units by mouth daily    Historical Provider, MD   ketoconazole (NIZORAL) 2 % cream Apply topically 2 times daily Apply topically daily L foot    Historical Provider, MD   donepezil (ARICEPT) 5 MG tablet Take 5 mg by mouth daily  21   Historical Provider, MD   QUEtiapine (SEROQUEL) 25 MG tablet Take 0.5 tablets by mouth daily 21   Hayes Alcocer MD       Current medications:    Current Facility-Administered Medications   Medication Dose Route Frequency Provider Last Rate Last Admin    clindamycin (CLEOCIN) 900 mg in dextrose 5 % 50 mL IVPB  900 mg IntraVENous Once Angelic Fair MD        vancomycin 1000 mg IVPB in 250 mL NS addavial  1,000 mg IntraVENous On Call to [address] [place] [address],[address],  mL/hr at 23 0756 1,000 mg at 23 0756    sodium chloride flush 0.9 % injection 5-40 mL  5-40 mL IntraVENous 2 times per day Rosamaria Lowry MD        sodium chloride flush 0.9 % injection 5-40 mL  5-40 mL IntraVENous PRN Rosamaria Lowry MD        0.9 % sodium chloride infusion   IntraVENous PRN Rosamaria Lowry  mL/hr at 23 0755 New Bag at 23 0755    acetaminophen (TYLENOL) tablet 650 mg  650 mg Oral Q4H PRN MD Henrietta Sandhu ondansetron (ZOFRAN-ODT) disintegrating tablet 4 mg  4 mg Oral Q8H PRN Lilian Plascencia MD        Or    ondansetron [facility]) injection 4 mg  4 mg IntraVENous Q6H PRN Lilian Plascencia MD         Current Outpatient Medications   Medication Sig Dispense Refill    acetaminophen (TYLENOL) 325 MG tablet Take 2 tablets by mouth every 4 hours as needed for Pain 120 tablet 0    Lactobacillus (ACIDOPHILUS) CAPS capsule Take 1 capsule by mouth 2 times daily      vitamin D3 (CHOLECALCIFEROL) 25 MCG (1000 UT) TABS tablet Take 1,000 Units by mouth daily      ketoconazole (NIZORAL) 2 % cream Apply topically 2 times daily Apply topically daily L foot      donepezil (ARICEPT) 5 MG tablet Take 5 mg by mouth daily       QUEtiapine (SEROQUEL) 25 MG tablet Take 0.5 tablets by mouth daily 30 tablet 0       Allergies:     Allergies   Allergen Reactions    Bactrim [Sulfamethoxazole-Trimethoprim]     Bee Venom     Ceftin [Cefuroxime]     Wasp Venom        Problem List:    Patient Active Problem List   Diagnosis Code    Closed fracture of sacrum, initial encounter ([facility] S32.10XA    Lumbar transverse process fracture, closed, initial encounter ([facility] S32.009A    History of COVID-19 Z86.16    Late onset [facility]'s dementia without behavioral disturbance [facility] G30.1, F02.80    Vitamin D deficiency E55.9    Closed left hip fracture, initial encounter ([facility] S72.002A       Past Medical History:        Diagnosis Date    Asymptomatic varicose veins of both lower extremities     Dementia with behavioral disturbance     Elevated blood pressure reading     Full dentures     History of hip fracture 2012    Small vessel disease, cerebrovascular        Past Surgical History:        Procedure Laterality Date    HIP FRACTURE SURGERY  2012       Social History:    Social History     Tobacco Use    Smoking status: Unknown    Smokeless tobacco: Never   Substance Use Topics    Alcohol use: Not on file Counseling given: Not Answered      Vital Signs (Current):   Vitals:    02/11/23 2100 02/11/23 2130 02/12/23 0600 02/12/23 0731   BP: (!) 145/90 (!) 161/69 (!) 155/74 (!) 147/77   Pulse:    (!) 110   Resp:    18   Temp:    98.4 °F (36.9 °C)   TempSrc:    Temporal   SpO2:    (!) 89%   Weight:       Height:                                                  BP Readings from Last 3 Encounters:   02/12/23 (!) 147/77   12/26/22 138/60   12/24/22 (!) 147/51       NPO Status: Time of last liquid consumption: 1700                        Time of last solid consumption: 1700                        Date of last liquid consumption: 02/11/23                        Date of last solid food consumption: 02/11/23    BMI:   Wt Readings from Last 3 Encounters:   02/11/23 130 lb (59 kg)   12/24/22 130 lb (59 kg)   01/21/22 130 lb (59 kg)     Body mass index is 23.78 kg/m². CBC:   Lab Results   Component Value Date/Time    WBC 5.7 02/11/2023 06:00 PM    RBC 4.38 02/11/2023 06:00 PM    HGB 12.1 02/11/2023 06:00 PM    HCT 36.8 02/11/2023 06:00 PM    MCV 84.1 02/11/2023 06:00 PM    RDW 15.5 02/11/2023 06:00 PM     02/11/2023 06:00 PM       CMP:   Lab Results   Component Value Date/Time     02/11/2023 06:00 PM    K 4.1 02/11/2023 06:00 PM    K 3.9 01/22/2022 06:37 AM     02/11/2023 06:00 PM    CO2 24 02/11/2023 06:00 PM    BUN 9 02/11/2023 06:00 PM    CREATININE 0.77 02/11/2023 06:00 PM    GFRAA >60.0 01/22/2022 06:37 AM    LABGLOM >60.0 02/11/2023 06:00 PM    GLUCOSE 119 02/11/2023 06:00 PM    PROT 6.3 02/11/2023 06:00 PM    CALCIUM 8.6 02/11/2023 06:00 PM    BILITOT 0.3 02/11/2023 06:00 PM    ALKPHOS 93 02/11/2023 06:00 PM    AST 20 02/11/2023 06:00 PM    ALT 15 02/11/2023 06:00 PM       POC Tests: No results for input(s): POCGLU, POCNA, POCK, POCCL, POCBUN, POCHEMO, POCHCT in the last 72 hours.     Coags:   Lab Results   Component Value Date/Time    PROTIME 13.1 02/11/2023 06:09 PM    INR 1.0 02/11/2023 06:09 PM APTT 30.6 02/11/2023 06:09 PM       HCG (If Applicable): No results found for: PREGTESTUR, PREGSERUM, HCG, HCGQUANT     ABGs: No results found for: PHART, PO2ART, YCW3NYF, LCG9BOP, BEART, P9LPTVHE     Type & Screen (If Applicable):  No results found for: LABABO, LABRH    Drug/Infectious Status (If Applicable):  No results found for: HIV, HEPCAB    COVID-19 Screening (If Applicable): No results found for: COVID19        Anesthesia Evaluation    Airway: Mallampati: II  TM distance: >3 FB   Neck ROM: full  Mouth opening: > = 3 FB   Dental:    (+) edentulous      Pulmonary:Negative Pulmonary ROS breath sounds clear to auscultation                             Cardiovascular:Negative CV ROS            Rhythm: regular                      Neuro/Psych:   (+) psychiatric history:            GI/Hepatic/Renal: Neg GI/Hepatic/Renal ROS            Endo/Other: Negative Endo/Other ROS                    Abdominal:             Vascular: negative vascular ROS. Other Findings:           Anesthesia Plan      MAC and regional     ASA 3     (US guided PENG block  GA backup)  Induction: intravenous. MIPS: Prophylactic antiemetics administered. Anesthetic plan and risks discussed with healthcare power of  and patient. Use of blood products discussed with healthcare power of  and patient whom consented to blood products.    Plan discussed with surgical team.    Attending anesthesiologist reviewed and agrees with Preprocedure content      Post-op pain plan if not by surgeon: single peripheral nerve block            Soco Sandoval MD   2/12/2023

## 2023-02-12 NOTE — PROGRESS NOTES
Orthopedic Note- Full consult note to follow    Patient with unwitnessed fall and sustained a displaced left femoral neck fracture. Base on fracture pattern and displacement would recommend left hip hemiarthroplasty to allow for immediate mobility. Options were discussed with POA for palliative care vs surgery and POA would like to proceed with surgery. Will plan for left hip hemiarthroplasty (vs total hip replacement), OR called, and case is planned for 8 AM tomorrow. Please keep NPO except PO meds at midnight. Trauma to admit, appreciate care.     Dr. Iza Marino

## 2023-02-12 NOTE — PROGRESS NOTES
Patient is medically optimized for OR with orthopedics today for the left femoral neck fracture. Please reach out with any questions or concerns.       Paulina Garcia PA-C

## 2023-02-13 PROBLEM — G89.11 ACUTE PAIN DUE TO TRAUMA: Status: ACTIVE | Noted: 2023-02-13

## 2023-02-13 PROBLEM — D62 ACUTE BLOOD LOSS ANEMIA: Status: ACTIVE | Noted: 2023-02-13

## 2023-02-13 PROBLEM — R00.0 TACHYCARDIA: Status: ACTIVE | Noted: 2023-02-13

## 2023-02-13 PROBLEM — G89.18 ACUTE POST-OPERATIVE PAIN: Status: ACTIVE | Noted: 2023-02-13

## 2023-02-13 LAB
HCT VFR BLD CALC: 31.1 % (ref 37–47)
HEMOGLOBIN: 10.2 G/DL (ref 12–16)
MCH RBC QN AUTO: 27.7 PG (ref 27–31.3)
MCHC RBC AUTO-ENTMCNC: 32.8 % (ref 33–37)
MCV RBC AUTO: 84.4 FL (ref 79.4–94.8)
PDW BLD-RTO: 15.6 % (ref 11.5–14.5)
PLATELET # BLD: 182 K/UL (ref 130–400)
RBC # BLD: 3.69 M/UL (ref 4.2–5.4)
REASON FOR REJECTION: NORMAL
REJECTED TEST: NORMAL
WBC # BLD: 9.9 K/UL (ref 4.8–10.8)

## 2023-02-13 PROCEDURE — 97166 OT EVAL MOD COMPLEX 45 MIN: CPT

## 2023-02-13 PROCEDURE — 2700000000 HC OXYGEN THERAPY PER DAY

## 2023-02-13 PROCEDURE — 2500000003 HC RX 250 WO HCPCS: Performed by: STUDENT IN AN ORGANIZED HEALTH CARE EDUCATION/TRAINING PROGRAM

## 2023-02-13 PROCEDURE — 6370000000 HC RX 637 (ALT 250 FOR IP): Performed by: STUDENT IN AN ORGANIZED HEALTH CARE EDUCATION/TRAINING PROGRAM

## 2023-02-13 PROCEDURE — 85027 COMPLETE CBC AUTOMATED: CPT

## 2023-02-13 PROCEDURE — 6360000002 HC RX W HCPCS: Performed by: STUDENT IN AN ORGANIZED HEALTH CARE EDUCATION/TRAINING PROGRAM

## 2023-02-13 PROCEDURE — 36415 COLL VENOUS BLD VENIPUNCTURE: CPT

## 2023-02-13 PROCEDURE — 99232 SBSQ HOSP IP/OBS MODERATE 35: CPT | Performed by: STUDENT IN AN ORGANIZED HEALTH CARE EDUCATION/TRAINING PROGRAM

## 2023-02-13 PROCEDURE — 97162 PT EVAL MOD COMPLEX 30 MIN: CPT

## 2023-02-13 PROCEDURE — 1210000000 HC MED SURG R&B

## 2023-02-13 RX ORDER — ENOXAPARIN SODIUM 100 MG/ML
30 INJECTION SUBCUTANEOUS 2 TIMES DAILY
Status: DISCONTINUED | OUTPATIENT
Start: 2023-02-13 | End: 2023-02-15 | Stop reason: HOSPADM

## 2023-02-13 RX ORDER — SODIUM CHLORIDE, SODIUM LACTATE, POTASSIUM CHLORIDE, AND CALCIUM CHLORIDE .6; .31; .03; .02 G/100ML; G/100ML; G/100ML; G/100ML
500 INJECTION, SOLUTION INTRAVENOUS ONCE
Status: DISCONTINUED | OUTPATIENT
Start: 2023-02-13 | End: 2023-02-15 | Stop reason: HOSPADM

## 2023-02-13 RX ADMIN — ACETAMINOPHEN 650 MG: 325 TABLET ORAL at 04:12

## 2023-02-13 RX ADMIN — Medication 1 TABLET: at 10:31

## 2023-02-13 RX ADMIN — ACETAMINOPHEN 650 MG: 325 TABLET ORAL at 10:32

## 2023-02-13 RX ADMIN — ENOXAPARIN SODIUM 30 MG: 100 INJECTION SUBCUTANEOUS at 10:31

## 2023-02-13 RX ADMIN — SENNOSIDES AND DOCUSATE SODIUM 2 TABLET: 50; 8.6 TABLET ORAL at 10:34

## 2023-02-13 RX ADMIN — CLINDAMYCIN PHOSPHATE 900 MG: 900 INJECTION, SOLUTION INTRAVENOUS at 00:01

## 2023-02-13 RX ADMIN — Medication 1 TABLET: at 21:41

## 2023-02-13 RX ADMIN — POLYETHYLENE GLYCOL 3350 17 G: 17 POWDER, FOR SOLUTION ORAL at 10:31

## 2023-02-13 RX ADMIN — DONEPEZIL HYDROCHLORIDE 5 MG: 5 TABLET, FILM COATED ORAL at 10:32

## 2023-02-13 RX ADMIN — SENNOSIDES AND DOCUSATE SODIUM 2 TABLET: 50; 8.6 TABLET ORAL at 21:41

## 2023-02-13 RX ADMIN — ENOXAPARIN SODIUM 30 MG: 100 INJECTION SUBCUTANEOUS at 21:40

## 2023-02-13 RX ADMIN — QUETIAPINE FUMARATE 12.5 MG: 25 TABLET ORAL at 10:32

## 2023-02-13 RX ADMIN — ACETAMINOPHEN 650 MG: 325 TABLET ORAL at 21:41

## 2023-02-13 ASSESSMENT — PAIN DESCRIPTION - LOCATION
LOCATION: HIP

## 2023-02-13 ASSESSMENT — PAIN DESCRIPTION - ORIENTATION
ORIENTATION: LEFT

## 2023-02-13 ASSESSMENT — PAIN DESCRIPTION - DESCRIPTORS
DESCRIPTORS: ACHING
DESCRIPTORS: ACHING;DISCOMFORT;THROBBING
DESCRIPTORS: ACHING;DISCOMFORT
DESCRIPTORS: ACHING;DISCOMFORT

## 2023-02-13 ASSESSMENT — PAIN SCALES - GENERAL
PAINLEVEL_OUTOF10: 8
PAINLEVEL_OUTOF10: 7
PAINLEVEL_OUTOF10: 5
PAINLEVEL_OUTOF10: 5

## 2023-02-13 NOTE — PROGRESS NOTES
Physician Progress Note      PATIENT:               Carol Ann Olsen  CSN #:                  [id number]  :                       1940  ADMIT DATE:       2023 5:48 PM  [address] [place] DATE:  Jud Guzman  PROVIDER #:        Nakita Gomez MD          QUERY TEXT:    Pt admitted with left femur neck fracture after ground level fall. Pt noted to   have diffuse osteopenia with history of osteoporosis. If possible, please   document in progress notes and discharge summary if you are evaluating and/or   treating any of the following: The medical record reflects the following:  Risk Factors: Osteoporisis  Clinical Indicators: XR hip/pelvis: The left hip demonstrates a mildly   displaced subcapital femoral neck fracture on today's study with mild   elevation of the proximal femur with lateral displacement. ? There remainder of   the pelvis appears intact with diffuse osteopenia. Treatment: Ortho consult, left hip hemiarthroplasty, labs and monitoring    Thank you,  Alicia Vela   Clinical Documentation Improvement Specialist  W: [phone]  Options provided:  -- Pathological left femur neck fracture  -- Pathological left femur neck fracture due to osteopenia  -- Pathological left femur neck fracture due to osteopenia following fall   which would not usually break a normal, healthy bone  -- Osteoporotic left femur neck Fracture  -- Osteoporotic left femur neck  fracture following fall which would not   usually break a normal, healthy bone  -- Traumatic left femur neck  fracture  -- Other - I will add my own diagnosis  -- Disagree - Not applicable / Not valid  -- Disagree - Clinically unable to determine / Unknown  -- Refer to Clinical Documentation Reviewer    PROVIDER RESPONSE TEXT:    This patient has a traumatic left femur neck fracture.     Query created by: Yun Thompson on 2023 11:13 AM      Electronically signed by:  Nakita Gomez MD 2023 6:33 PM

## 2023-02-13 NOTE — PROGRESS NOTES
[facility] OCCUPATIONAL THERAPY EVALUATION - ACUTE     NAME: Germaine Avelar  : 1940 (7 y.o.)  MRN: [id number]  CODE STATUS: DNR-CC  Room: W275/W275-01    Date of Service: 2023    Patient Diagnosis(es): Closed fracture of left hip, initial encounter [facility]) Delonte Felipea  Fall, initial encounter [W19. XXXA]  Closed left hip fracture, initial encounter [facility]) [S72.002A]   Patient Active Problem List    Diagnosis Date Noted    Acute pain due to trauma 2023    Acute post-operative pain 2023    Acute blood loss anemia 2023    Tachycardia 2023    Osteopenia of left thigh 2023    Fall 2023    Closed left hip fracture, initial encounter ([facility] 2023    Vitamin D deficiency 2022    History of COVID-19 2022    Late onset Alzheimer's dementia without behavioral disturbance ([facility] 2022    Lumbar transverse process fracture, closed, initial encounter ([facility]     Closed fracture of sacrum, initial encounter ([facility] 2022        Past Medical History:   Diagnosis Date    Asymptomatic varicose veins of both lower extremities     Dementia with behavioral disturbance     Elevated blood pressure reading     Full dentures     History of hip fracture     Small vessel disease, cerebrovascular      Past Surgical History:   Procedure Laterality Date    HIP FRACTURE SURGERY          Restrictions  Restrictions/Precautions: Fall Risk, Up as Tolerated, Weight Bearing      Left Lower Extremity Weight Bearing: Weight Bearing As Tolerated  Hip Precautions: Posterior hip precautions    Safety Devices: Safety Devices  Type of Devices: All fall risk precautions in place;Call light within reach; Left in bed;Bed alarm in place     Patient's date of birth confirmed: Pt verbally unable, verified via wrist band    General:  Chart Reviewed: Yes  Patient assessed for rehabilitation services?: Yes    Subjective  Subjective: \"I feel okay\"       Pain at start of treatment: No    Pain at end of treatment: No    Location:   Nursing notified: Not Applicable  RN:   Intervention: Repositioned    Prior Level of Function:  Social/Functional History  Lives With: Alone  Type of Home: Assisted living [facility])  Home Access: Level entry  Bathroom Accessibility: Accessible  Home Equipment: Walker, rolling, Wheelchair-manual  ADL Assistance:  (assist for bathing/dressing. Was toileting herself and feeding herself)  Ambulation Assistance:  (propelling in [address] using B LEs, ambulates with hand held assist in halls)  Transfer Assistance: Independent  Additional Comments: Has had functional decline over past few months. Has been working with therapy to walk with walker. Multiple falls. Information per family member- pt unable    OBJECTIVE:     Orientation Status:  Orientation  Orientation Level: Disoriented X4    Observation:  Observation/Palpation  Posture: Fair  Observation: Pt alert and attentive, agreeable with encouragement, pleasantly confused. Hip abductor pillow in place at start and end of tx. Cognition Status:  Cognition  Overall Cognitive Status: Exceptions  Arousal/Alertness: Inconsistent responses to stimuli  Following Commands: Inconsistently follows commands  Attention Span: Difficulty attending to directions; Difficulty dividing attention  Memory: Decreased recall of precautions;Decreased recall of recent events;Decreased short term memory;Decreased long term memory;Decreased recall of biographical Information  Safety Judgement: Decreased awareness of need for assistance;Decreased awareness of need for safety  Problem Solving: Assistance required to generate solutions;Assistance required to identify errors made;Assistance required to correct errors made;Assistance required to implement solutions;Decreased awareness of errors  Insights: Not aware of deficits  Initiation: Requires cues for all  Sequencing: Requires cues for all  Cognition Comment: Baseline significant dementia    Perception Status:  Perception  Overall Perceptual Status: WFL    Vision and Hearing Status:  Hearing  Hearing: Within functional limits   Vision - Basic Assessment  Prior Vision: No visual deficits  Visual History: No significant visual history  Patient Visual Report: No visual complaint reported. Visual Field Cut: No  Oculo Motor Control: WNL    GROSS ASSESSMENT AROM/PROM:  AROM: Within functional limits       ROM:   LUE AROM (degrees)  LUE AROM : WFL  LUE General AROM: Arthritic changes  Left Hand AROM (degrees)  Left Hand AROM: WFL  Left Hand General AROM: Arthritic changes  RUE AROM (degrees)  RUE AROM : WFL  RUE General AROM: Arthritic changes  Right Hand AROM (degrees)  Right Hand AROM: WFL  Right Hand General AROM: Arthritic changes    UE STRENGTH:  Strength: Generally decreased, functional    UE COORDINATION:  Coordination: Generally decreased, functional    UE TONE:  Tone: Normal    UE SENSATION:  Sensation: Intact    Hand Dominance:  Hand Dominance  Hand Dominance: Right    ADL Status:  ADL  Feeding: Minimal assistance  Grooming: Dependent/Total  UE Bathing: Dependent/Total  LE Bathing: Dependent/Total  UE Dressing: Dependent/Total  LE Dressing: Dependent/Total  Toileting: Dependent/Total  Additional Comments: Hand over hand assistance for all aspects of ADL, poor sequencing and safety, very anxious at [facility] Transfers  Toilet Transfer: Unable to assess  Toilet Transfers Comments: Anticipate dependent    Functional Mobility:    Transfers  Sit to stand: Dependent/Total, 2 Person assistance  Stand to sit: Dependent/Total, 2 Person assistance  Transfer Comments: Fearful and retropulsive throughout    Patient ambulated NT due to unable to safely take any steps      Bed Mobility  Bed mobility  Supine to Sit: Dependent/Total;2 Person assistance  Sit to Supine: 2 Person assistance;Dependent/Total  Bed Mobility Comments: Increased time and effort.  Pt unable to follow sequencing for effective completion requiring additional assist.    Seated and Standing Balance:  Balance  Sitting: Impaired  Sitting - Static: Fair (occasional)  Sitting - Dynamic: Fair (occasional)  Standing: Impaired  Standing - Static: Poor  Standing - Dynamic: Poor    Functional Endurance:  Activity Tolerance  Activity Tolerance: Patient limited by fatigue;Treatment limited secondary to decreased cognition    D/C Recommendations:  OT D/C RECOMMENDATIONS  REQUIRES OT FOLLOW-UP: Yes    Equipment Recommendations:  OT Equipment Recommendations  Other: Continue to assess    OT Education:   Patient Education  Education Given To: Patient  Education Provided: Plan of Care;Role of Therapy  Education Method: Verbal  Barriers to Learning: Cognition  Education Outcome: Continued education needed    OT Follow Up:   OT D/C RECOMMENDATIONS  REQUIRES OT FOLLOW-UP: Yes       Assessment/Discharge Disposition:  Assessment: Pt is an 80year old woman from [facility] who [facility] to [facility] s/p fall with L hip fx seen s/p hemiarthroplasty. Pt. demonstrates the above deficits which impact her ability to perform ADLs and IADLs. Pt. limited d/t fatigue, weakness and cognitive deficits. Pt would benefit from continued OT to maximize independence and safety with ADL tasks.   Performance deficits / Impairments: Decreased functional mobility , Decreased ADL status, Decreased strength, Decreased endurance, Decreased balance, Decreased safe awareness, Decreased cognition  Prognosis: Good  Discharge Recommendations: Continue to assess pending progress  Decision Making: Medium Complexity  History: Pt's medical history is moderately complex  Exam: Pt. has 7 performance deficits  Assistance / Modification: Pt requires total A    AMPAC (Six Click) Self care Score   How much help is needed for putting on and taking off regular lower body clothing?: Total  How much help is needed for bathing (which includes washing, rinsing, drying)?: Total  How much help is needed for toileting (which includes using toilet, bedpan, or urinal)?: Total  How much help is needed for putting on and taking off regular upper body clothing?: Total  How much help is needed for taking care of personal grooming?: Total  How much help for eating meals?: Total  AM-PAC Inpatient Daily Activity Raw Score: 6  AM-PAC Inpatient ADL T-Scale Score : 17.07  ADL Inpatient CMS 0-100% Score: 100    Therapy key for assistance levels -   Independent/Mod I = Pt. is able to perform task with no assistance but may require a device   Stand by assistance = Pt. does not perform task at an independent level but does not need physical assistance, requires verbal cues  Minimal, Moderate, Maximal Assistance = Pt. requires physical assistance (25%, 50%, 75% assist from helper) for task but is able to actively participate in task   Dependent = Pt. requires total assistance with task and is not able to actively participate with task completion     Plan:  Occupational Therapy Plan  Times Per Week: 1-4x  Therapy Duration:  (Length of acute stay)  Current Treatment Recommendations: Strengthening, Balance training, Functional mobility training, Pain management, Safety education & training, Patient/Caregiver education & training, Equipment evaluation, education, & procurement, Self-Care / ADL, Cognitive/Perceptual training, Endurance training    Goals:   Patient will:    - Improve functional endurance to tolerate/complete 30 mins of ADL's  - Be Min A in UB ADLs   - Be Min A in LB ADLs  - Be Min A in ADL transfers without LOB  - Be Min A in toileting tasks  - Improve B hand fine motor coordination to [facility] in order to manage clothing fasteners/self-care containers in a timely manner  - Improve B UE strength and endurance to 3+/5 in order to participate in self-care activities as projected. - Access appropriate D/C site with as few architectural barriers as possible.   - Sequence self-care tasks with no verbal cues for safety    Patient Goal: Patient goals : Pt unable to state a goal- family goal is for pt to return to least restrictive environment     Discussed and agreed upon: Yes Comments:       Therapy Time:   Individual   Time In 0844   Time Out 0901   Minutes 17     Eval: 17 minutes     Electronically signed by:     QUINCY Vega/L,   2/13/2023, 2:24 PM

## 2023-02-13 NOTE — PLAN OF CARE
See OT evaluation for all goals and OT POC.  Electronically signed by QUINCY Booker/L on 2/13/2023 at 2:26 PM

## 2023-02-13 NOTE — PROGRESS NOTES
Hip surgery    Progress Note    Subjective:     Post-Operative Day: 1 Status Post left hip hemiarthroplasty  Systemic or Specific Complaints:No Complaints    Objective:     CURRENT VITALS:  BP (!) 109/47   Pulse 95   Temp 97.6 °F (36.4 °C) (Oral)   Resp 16   Ht 5' 2\" (1.575 m)   Wt 130 lb (59 kg)   SpO2 95%   BMI 23.78 kg/m²     General: Alter to person, appears stated age, cooperative   Wound: No Erythema, No Edema, No Drainage, and dressing CDI   Motion: Painful range of Motion   DVT Exam: No evidence of DVT seen on physical exam.  Negative Hernandez's sign. No significant calf/ankle edema. NVI in lower extremity. Thigh swollen but soft. Moving foot and ankle. Data Review  Recent Labs     02/11/23  1800   WBC 5.7   RBC 4.38   HGB 12.1   HCT 36.8*   MCV 84.1   MCH 27.7   MCHC 32.9*   RDW 15.5*          Assessment:     Status Post left Hip hemiArthroplasty. Doing well postoperatively. Patient does not report any pain at this time. She does mention some pain with movement. Plan:      1: Continues current post-op course :  2:  Continue Deep venous thrombosis prophylaxis:Lovenox 30 mg SQ BID for 35 days   3:  Continue physical therapy: WBAT with posterior hip precautions   4:  Continue Pain Control  5. Discharge planning: patient is a resident at [facility] memory unit. Patient's daughter whom is at bedside would like for the patient to return there at discharge.

## 2023-02-13 NOTE — DISCHARGE INSTRUCTIONS
Hip surgery  Discharge Instructions    To prevent Clot formation, you have been placed on the following medication:  Lovenox for 21 days started on  Surgical Site Care:  Dressing change every days and PRN (as needed) with 4 x 4 and porous tape. No betadine. If glue is present, leave open to air  Aquacel Ag/ mepilex (rubber) dressing is present, do not remove dressing for 7 days, unless heavily saturated. If heavily saturated, remove dressing and start daily dressing changes as described above   if Staples  will be removed on post-operative day 14 and steri-strips applied  Showering is permitted starting POD1 if waterproof aquacell dressing is present or when incision is covered with waterproof dressing, such as 4 x 4 and tegaderm  Leave dressing in place until follow up with surgeon   Physical Therapy:  Weight Bearing Status:  WBAT (Weight bearing as tolerated)   Hip Precautions  Per Physical Therapy handout, posterior hip precautions   Pain Medications  You were given - pt is very sensitive to narcotics- please use lowest doses possible  Wean off pain medications as you deem appropriate as long as pain is under control  Cold packs/Ice packs/Machine  May be used 3 times daily for 15-30 minutes as necessary  Be sure to have a barrier (cloth, clothing, towel) between the site and the ice pack to prevent frostbite  Contact  Orthopedic office if  Increased redness, swelling, drainage of any kind, and/or pain to surgery site. As well as new onset fevers and or chills. These could signify an infection. Calf or thigh tenderness to touch as well as increased swelling or redness. This could signify a clot formation. Numbness or tingling to an area around the incision site or below the incision site (toes). Any rash appears, increased  or new onset nausea/vomiting occur. This may indicate a reaction to a medication.    Phone # [phone]  Follow up with Surgeon,   I acknowledge that I have received fidelina hose and understand the instructions on how and when to wear them (on during the day off at night)   Discharging RN who has gone over instructions and acknowledges fidelina hose have been received

## 2023-02-13 NOTE — DISCHARGE INSTR - COC
Continuity of Care Form    Patient Name: John Gutierrez   :  1940  MRN:  [id number]    Admit date:  2023  Discharge date:  2/15/23    Code Status Order: [facility]   Advance Directives:   [facility] Documentation       Date/Time Healthcare Directive Type of Healthcare Directive Copy in [address] Agent's Name Healthcare Agent's Phone Number    [phone] Yes, patient has an advance directive for healthcare treatment Durable power of  for health care;Living will Yes, copy in chart Healthcare power of  Niyamini Magdaleno --            Admitting Physician:  Danny Palacios MD  PCP: Ariella Huang MD    Discharging Nurse: [address] [place] [address] Unit/Room#: M833/F828-34  Discharging Unit Phone Number: [phone]    Emergency Contact:   Extended Emergency Contact Information  Primary Emergency Contact: [address] [facility] Phone: [phone]  Relation: Niece/Nephew    Past Surgical History:  Past Surgical History:   Procedure Laterality Date    HIP FRACTURE SURGERY         Immunization History:   Immunization History   Administered Date(s) Administered    COVID-19, PFIZER GRAY top, DO NOT Dilute, (age 15 y+), IM, 30 mcg/0.3 mL 2022    COVID-19, PFIZER PURPLE top, DILUTE for use, (age 15 y+), 30mcg/0.3mL 2021, 2021, 2021       Active Problems:  Patient Active Problem List   Diagnosis Code    Closed fracture of sacrum, initial encounter ([facility] [address] S32.10XA    Lumbar transverse process fracture, closed, initial encounter ([facility] [address] S32.009A    History of COVID-19 Z86.16    Late onset Alzheimer's dementia without behavioral disturbance ([facility] [address] G30.1, F02.80    Vitamin D deficiency E55.9    Closed left hip fracture, initial encounter ([facility] [address] S72.002A    Osteopenia of left thigh M85.852    Fall W19. Jocelyn Estrada       Isolation/Infection:   Isolation            No Isolation          Patient Infection Status       None to display            Nurse Assessment:  Last Vital Signs: BP (!) 109/47   Pulse 95   Temp 97.6 °F (36.4 °C) (Oral)   Resp 16   Ht 5' 2\" (1.575 m)   Wt 130 lb (59 kg)   SpO2 95%   BMI 23.78 kg/m²     Last documented pain score (0-10 scale): Pain Level: 7  Last Weight:   Wt Readings from Last 1 Encounters:   02/11/23 130 lb (59 kg)     Mental Status:  disoriented and alert    IV Access:  - None    Nursing Mobility/ADLs:  Walking   Assisted  Transfer  Assisted  Bathing  Assisted  Dressing  Assisted  Toileting  Assisted  Feeding  Independent  Med Admin  Assisted  Med Delivery   crushed and prefers mixed with Applesauce    Wound Care Documentation and Therapy:  Incision 02/12/23 Hip Left;Posterior (Active)   Dressing Status Clean;Dry; Intact 02/12/23 2200   Drainage Amount None 02/12/23 1045   Number of days: 1        Elimination:  Continence: Bowel: No  Bladder: No  Urinary Catheter: None   Colostomy/Ileostomy/Ileal Conduit: No       Date of Last BM: 2/15/23    Intake/Output Summary (Last 24 hours) at 2/13/2023 7089  Last data filed at 2/12/2023 2200  Gross per 24 hour   Intake 2000 ml   Output --   Net 2000 ml     I/O last 3 completed shifts: In: 3000 [I.V.:2000; IV Piggyback:1000]  Out: -     Safety Concerns:     History of Falls (last 30 days) and At Risk for Falls    Impairments/Disabilities:      Left hip repair    Nutrition Therapy:  Current Nutrition Therapy:   - Oral Diet:  General    Routes of Feeding: Oral  Liquids: Thin Liquids  Daily Fluid Restriction: no  Last Modified Barium Swallow with Video (Video Swallowing Test): not done    Treatments at the Time of Hospital Discharge:   Respiratory Treatments: duoneb tx  Oxygen Therapy:  is not on home oxygen therapy.   Ventilator:    - No ventilator support    Rehab Therapies: Physical Therapy and Occupational Therapy  Weight Bearing Status/Restrictions: No weight bearing restrictions  Other Medical Equipment (for information only, NOT a DME order):  walker  Other Treatments: none    Patient's personal belongings (please select all that are sent with patient):  Dentures upper and lower    RN SIGNATURE:  Electronically signed by Nash Wu RN on 2/15/23 at 12:18 PM EST    CASE MANAGEMENT/SOCIAL WORK SECTION    Inpatient Status Date: ***    Readmission Risk Assessment Score:  Readmission Risk              Risk of Unplanned Readmission:  10           Discharging to Facility/ Agency   Name:   Address:  Phone:  Fax:    Dialysis Facility (if applicable)   Name:  Address:  Dialysis Schedule:  Phone:  Fax:    / signature: {Esignature:[id number]}    PHYSICIAN SECTION    Prognosis: {Prognosis:7606185599}    Condition at Discharge: Ghulam Duke Patient Condition:[id number]}    Rehab Potential (if transferring to Rehab): {Prognosis:7465816950}    Recommended Labs or Other Treatments After Discharge:   WBAT with posterior hip precautions to operative extremity with use of walker for assistance with ambulation   Leave dressing in place until follow up and surgeon will remove it on this day  Lovenox 30 mg SQ BID for 28 days for DVT prophylaxis  Follow up appointment March 1, 2023 with Dr. Renay Aase     Physician Certification: I certify the above information and transfer of John Gutierrez  is necessary for the continuing treatment of the diagnosis listed and that she requires {Admit to Appropriate Level of Care:54088} for {GREATER/LESS:405510947} 30 days.      Update Admission H&P: {CHP DME Changes in HUQQK:034731275}    PHYSICIAN SIGNATURE:  {Esignature:[id number]}

## 2023-02-13 NOTE — PROGRESS NOTES
TRAUMA DAILY PROGRESS NOTE      Patient Name: Rosario Beasley  Admission Date 2023    Hospital Day: 2  Patient seen and examined on 2023    INTERVAL HISTORY/EVENTS     Background: Rosario Beasley is a 80 y.o. female with past medical history of dementia presents as a CAT2 trauma s/p being found down at the nursing home (?)headstrike, (?)LOC, (-)AC,AP. Trauma work up reveals a left femoral neck fracture. Patient was admitted to the [facility] for OR fixation, PT/OT and pain control. Hospital Course:  2023: S/P being found down at nursing home and endorsing left hip pain. Found to have left femoral neck fracture  2023: OR with orthopedics. Returned to [facility] post-op. 24 Hour Events: This is my first time meeting this patient. No acute events overnight per patient or nursing. Pt is endorsing pain to her left hip, worse with movement. Tolerating PO intake without nausea or vomiting. Voiding spontaneously. No bowel movement since admission. Vital signs reviewed. Afebrile, not tachycardic, normotensive, sating appropriately on RA. Lab work reviewed. No leukocytosis. Down trend in H&H to 10.2/31.1 (21.1/36.8). Platelets normal.     UOP: Not recorded  BM x 0 since admit ()      PHYSICAL EXAM     Vitals Average, Min and Max for last 24 hours:  Temp: Temp: 97.6 °F (36.4 °C); Temp  Av.6 °F (36.4 °C)  Min: 96.9 °F (36.1 °C)  Max: 98.4 °F (36.9 °C)  Respirations: Resp  Av.5  Min: 10  Max: 23  Pulse: Pulse  Av  Min: 81  Max: 110  Blood Pressure: Systolic (72NEK), RVO:013 , Min:109 , ZEP:351   ; Diastolic (39IJP), PDL:89, Min:47, Max:80    SpO2: SpO2  Av.2 %  Min: 82 %  Max: 100 %    Vitals: BP (!) 109/47   Pulse 95   Temp 97.6 °F (36.4 °C) (Oral)   Resp 16   Ht 5' 2\" (1.575 m)   Wt 130 lb (59 kg)   SpO2 95%   BMI 23.78 kg/m²     Physical Exam:  Constitutional: Lying in bed, head of bed elevated. Family at bedside. HEENT: Atraumatic normocephalic.    Cardiovascular: Regular rhythm, tachycardic. Bilateral radial and DP pulses intact   Pulmonary: Clear to ausculation bilaterally. No wheezing, rhonchi or rales. Breathing comfortably on RA.   Abdominal: Soft. Non-distended. Non-tender.   Musculoskeletal: Good ROM in all extremities. Surgical dressing in place to left hip with minimal strike through. Thigh is edematous but compartments compressible  Neurological: A&O to self Motor and sensory grossly intact. No focal deficits.   Skin: Warm, dry     LABORATORY RESULTS (LAST 24 HOURS)     CBC with Differential:    Lab Results   Component Value Date/Time    WBC 5.7 02/11/2023 06:00 PM    RBC 4.38 02/11/2023 06:00 PM    HGB 12.1 02/11/2023 06:00 PM    HCT 36.8 02/11/2023 06:00 PM     02/11/2023 06:00 PM    MCV 84.1 02/11/2023 06:00 PM    MCH 27.7 02/11/2023 06:00 PM    MCHC 32.9 02/11/2023 06:00 PM    RDW 15.5 02/11/2023 06:00 PM    LYMPHOPCT 24.8 02/11/2023 06:00 PM    MONOPCT 12.8 02/11/2023 06:00 PM    EOSPCT 0.0 01/13/2021 12:00 AM    BASOPCT 0.7 02/11/2023 06:00 PM    MONOSABS 0.7 02/11/2023 06:00 PM    LYMPHSABS 1.4 02/11/2023 06:00 PM    EOSABS 0.2 02/11/2023 06:00 PM    BASOSABS 0.0 02/11/2023 06:00 PM     CMP:    Lab Results   Component Value Date/Time     02/11/2023 06:00 PM    K 4.1 02/11/2023 06:00 PM    K 3.9 01/22/2022 06:37 AM     02/11/2023 06:00 PM    CO2 24 02/11/2023 06:00 PM    BUN 9 02/11/2023 06:00 PM    CREATININE 0.77 02/11/2023 06:00 PM    GFRAA >60.0 01/22/2022 06:37 AM    LABGLOM >60.0 02/11/2023 06:00 PM    GLUCOSE 119 02/11/2023 06:00 PM    PROT 6.3 02/11/2023 06:00 PM    LABALBU 3.4 02/11/2023 06:00 PM    CALCIUM 8.6 02/11/2023 06:00 PM    BILITOT 0.3 02/11/2023 06:00 PM    ALKPHOS 93 02/11/2023 06:00 PM    AST 20 02/11/2023 06:00 PM    ALT 15 02/11/2023 06:00 PM     Magnesium:  No results found for: MG  PT/INR:    Lab Results   Component Value Date/Time    PROTIME 13.1 02/11/2023 06:09 PM    INR 1.0 02/11/2023 06:09 PM     PTT:    Lab  Results   Component Value Date/Time    APTT 30.6 02/11/2023 06:09 PM       IMAGING RESULTS (PERSONALLY REVIEWED)     No new imaging to review. ASSESSMENT & PLAN     Diagnoses:  Found down with presumed fall  Left femoral neck fracture (ortho, s/p L hemiarthroplasty 2/12)  Acute pain due to trauma  Acute post-op pain   Acute blood loss anemia  Tachycardia    PMHx: Dementia     Incidental Findings: None, reviewed by [facility] 2/13/23      ASSESSMENT/PLAN:  Neurological: Acute pain due to trauma, acute post-op pain. Hx dementia  - Tylenol 650mg q6  - Oxycodone 2.5/5mg q4 PRN mod/severe pain  - Continue home donepezil 5mg daily  - Continue home seroquel 12.5mg daily     Cardiovascular: Tachycardic this AM with seemingly regular rhythm. Per family, no hx of afib  - Vital signs per unit protocol  - Obtain a stat EKG  - Telemetry  - Give 500mL bolus of LR     Respiratory: Sating appropriately on RA  - Maintain O2 sats > 92%  - Encourage IS 10x hourly     GI/Diet: Tolerating PO intake, No bowel movement since admission   - Regular diet  - Bowel regimen: senokot BID, miralax daily     Renal/Electrolytes: No acute electrolyte abnormalities, BUN/Cr within normal limits on last BMP  - HLIV. Will give 500mL bolus of LR today d/t tachycardia with presumed hypovolemia   - AM BMP     ID: No leukocytosis, afebrile  - Liliana-op abx only     Heme: Down trend in H&H  - No indication for transfusion  - AM CBC     Endocrine: No acute or chronic concerns       MSK: Left femoral neck fracture s/p left hemiarthroplasty   - Spines: Clear  - Weight Bearing Restrictions: WBAT  - Activity: Ambulatory with assistance  - PT/OT: Consulted, rec return to SNF    Prophylaxis:   - SCDs and Lovenox 30mg BID for VTE PPx    Lines/Tubes/Drains:  - Maintain PIVs  - No indication for harris catheter, monitor for urinary retention    Dispo: Remain on RNF for monitoring of tachycardia and trending of H&H. Will return to her nursing home on discharge.    Accom Status: General Status      - Follow up with Orthopedics [facility] in 2 weeks for post-op check  - Follow up with PCP for chronic medical conditions  - No trauma follow up indicated       Please call for any questions or concerns.   Liza Chavez Treatment Team Sticky Note for contact information]      Chester Bower PA-C  Trauma/Critical Care  [see Treatment Team Sticky Note for contact information]     This patient's plan of care was discussed and made in collaboration with Trauma Attending physician, Noemí Manning MD.

## 2023-02-13 NOTE — CARE COORDINATION
FELISHA met with the pt and her Niece to discuss the DC plan. Due to the pt's dementia they would like her to return to her secure unit at [facility] assisted living if possible. AZARW called [facility] to discuss if they could provide for the pt's care needs. They can do a 2 person transfer to a wheelchair and they can provide 5 days per week for out patient therapy. They are going to do an onsite with the pt tomorrow to make sure that they are prepared to take care of all of her needs.

## 2023-02-13 NOTE — PROGRESS NOTES
Physical Therapy Med Surg Initial Assessment  Facility/Department: [facility]  Room: I939/Z210-90       NAME: Tiana Chew  : 1940 (16 y.o.)  MRN: [id number]  CODE STATUS: DNR-CC    Date of Service: 2023    Patient Diagnosis(es): Closed fracture of left hip, initial encounter [facility])   Fall, initial encounter [W19. XXXA]  Closed left hip fracture, initial encounter [facility])    Chief Complaint   Patient presents with    Fall    Hip Pain     Left hip     Patient Active Problem List    Diagnosis Date Noted    Osteopenia of left thigh 2023    Fall 2023    Closed left hip fracture, initial encounter ([facility] [address] 2023    Vitamin D deficiency 2022    History of COVID-19 2022    Late onset Alzheimer's dementia without behavioral disturbance ([facility] [address] 2022    Lumbar transverse process fracture, closed, initial encounter ([facility] [address]     Closed fracture of sacrum, initial encounter ([facility] [address] 2022        Past Medical History:   Diagnosis Date    Asymptomatic varicose veins of both lower extremities     Dementia with behavioral disturbance     Elevated blood pressure reading     Full dentures     History of hip fracture     Small vessel disease, cerebrovascular      Past Surgical History:   Procedure Laterality Date    HIP FRACTURE SURGERY         Chart Reviewed: Yes  Patient assessed for rehabilitation services?: Yes  Family / Caregiver Present: Yes (maykel)  Diagnosis: Closed left hip fracture, initial encounter [facility]) s/p hemiarthroplasy 23  General Comment  Comments: Pt resting in bed - agreeable to PT evaluation    Restrictions:  Restrictions/Precautions: Fall Risk, Up as Tolerated, Weight Bearing  Lower Extremity Weight Bearing Restrictions  Left Lower Extremity Weight Bearing: Weight Bearing As Tolerated  Position Activity Restriction  Hip Precautions: Posterior hip precautions     SUBJECTIVE:   Subjective: \"I'd like that. \"    Pain  Pain: Denies pre and post session pain. Does wince during active mobility, however improves with rest. RN notified. Prior Level of Function:  Social/Functional History  Lives With: Alone  Type of Home: Assisted living [facility])  Home Access: Level entry  Bathroom Accessibility: Accessible  Home Equipment: Walker, rolling, Wheelchair-manual  ADL Assistance:  (assist for bathing/dressing. Was toileting herself and feeding herself)  Ambulation Assistance:  (propelling in [address] using B LEs, ambulates with hand held assist in halls)  Transfer Assistance: Independent  Additional Comments: Has had functional decline over past few months. Has been working with therapy to walk with walker. Multiple falls. OBJECTIVE:   Vision  Vision: Within Functional Limits  Hearing: Within functional limits    Cognition:  Orientation Level: Disoriented X4  Follows Commands: Within Functional Limits  Overall Cognitive Status: Exceptions    Observation/Palpation  Observation: Pt alert and attentive, agreeable with encouragement, pleasantly confused. Hip abductor pillow in place at start and end of tx. ROM:  RLE PROM: WFL  LLE PROM: WFL  LLE General PROM: noted significant valgus deforminty at knee    Strength:  Strength RLE  Comment: Grossly 2/4  Strength LLE  Comment: Grossly 2-/5  Strength Other  Other: Unable to follow formal MMT    Neuro:  Balance  Sitting - Static: Fair  Sitting - Dynamic: Poor  Standing - Static: Poor  Comments: Pt biases in trunk L side flexion. Requires assist to reposition in seated at in supine. Tone: Normal    Sensation: Intact    Bed mobility  Supine to Sit: Dependent/Total;2 Person assistance  Sit to Supine: 2 Person assistance;Dependent/Total  Bed Mobility Comments: Increased time and effort.  Pt unable to follow sequencing for effective completion requiring additional assist.    Transfers  Sit to Stand: Dependent/Total;2 Person Assistance  Stand to Sit: Dependent/Total;2 Person Assistance  Comment: Pt retropulsive throughout. Fearful of falling. Ambulation  Comments: Standing only X 5sec with Dep +2 assist                   Activity Tolerance  Activity Tolerance: Treatment limited secondary to decreased cognition    Patient Education  Education Given To: Patient  Education Provided: Role of Therapy;Plan of Care  Education Method: Verbal  Barriers to Learning: Cognition  Education Outcome: Verbalized understanding;Continued education needed       ASSESSMENT:   Body Structures, Functions, Activity Limitations Requiring Skilled Therapeutic Intervention: Decreased functional mobility ; Decreased ADL status; Decreased ROM; Decreased body mechanics; Decreased strength;Decreased safe awareness;Decreased cognition;Decreased endurance;Decreased balance;Decreased coordination; Increased pain  Decision Making: Medium Complexity  History: High  Exam: Med  Clinical Presentation: Med    Therapy Prognosis: Good  Barriers to Learning: confusion     Repositioned BTB with ABD in place. DISCHARGE RECOMMENDATIONS:  Discharge Recommendations: Continue to assess pending progress, Patient would benefit from continued therapy after discharge    Assessment: Continued PT indicated to progress mobility and facilitate DC at highest level of indep and safety. Rec therapy stay prior to DC home. Rec 24/7 assist for all mobility. Requires PT Follow-Up: Yes       PLAN OF CARE:  Physcial Therapy Plan  General Plan: 1 time a day 3-6 times a week  Current Treatment Recommendations: Strengthening, ROM, Balance training, Functional mobility training, Transfer training, ADL/Self-care training, Cognitive/Perceptual training, Gait training, Neuromuscular re-education, Manual, Home exercise program, Safety education & training, Patient/Caregiver education & training, Equipment evaluation, education, & procurement, Modalities, Positioning    Safety Devices  Type of Devices:  All fall risk precautions in place    Goals:  [facility] Term Goal 1: Pt to complete bed mobility with Zina  Long Term Goal 2: Pt to complete transfers with Zina  Long Term Goal 3: Pt to stand at [employer] X 3min with Zina  Long Term Goal 4: Pt to maintain unsupported sitting X 5min with supervision    [facility] (6 CLICK) [place] Raw Score : 6     Therapy Time:   Individual   Time In 0844   Time Out 0901   Minutes [address] [place], 02/13/23 at 9:57 AM         Definitions for assistance levels  Independent = pt does not require any physical supervision or assistance from another person for activity completion. Device may be needed.   Stand by assistance = pt requires verbal cues or instructions from another person, close to but not touching, to perform the activity  Minimal assistance= pt performs 75% or more of the activity; assistance is required to complete the activity  Moderate assistance= pt performs 50% of the activity; assistance is required to complete the activity  Maximal assistance = pt performs 25% of the activity; assistance is required to complete the activity  Dependent = pt requires total physical assistance to accomplish the task

## 2023-02-13 NOTE — FLOWSHEET NOTE
EKG attempted per 2 PCA's, continues to refuse to cooperate. Telemetry applied successfully. Sinus Tach.

## 2023-02-13 NOTE — PLAN OF CARE
Problem: Discharge Planning  Goal: Discharge to home or other facility with appropriate resources  Outcome: Progressing     Problem: Pain  Goal: Verbalizes/displays adequate comfort level or baseline comfort level  Outcome: Progressing     Problem: Neurosensory - Adult  Goal: Achieves maximal functionality and self care  Outcome: Progressing     Problem: Respiratory - Adult  Goal: Achieves optimal ventilation and oxygenation  Outcome: Progressing     Problem: Cardiovascular - Adult  Goal: Maintains optimal cardiac output and hemodynamic stability  Outcome: Progressing  Goal: Absence of cardiac dysrhythmias or at baseline  Outcome: Progressing     Problem: Skin/Tissue Integrity - Adult  Goal: Skin integrity remains intact  Outcome: Progressing  Goal: Incisions, wounds, or drain sites healing without S/S of infection  Outcome: Progressing  Goal: Oral mucous membranes remain intact  Outcome: Progressing     Problem: Musculoskeletal - Adult  Goal: Return mobility to safest level of function  Outcome: Progressing  Goal: Maintain proper alignment of affected body part  Outcome: Progressing  Goal: Return ADL status to a safe level of function  Outcome: Progressing     Problem: Infection - Adult  Goal: Absence of infection at discharge  Outcome: Progressing  Goal: Absence of infection during hospitalization  Outcome: Progressing  Goal: Absence of fever/infection during anticipated neutropenic period  Outcome: Progressing     Problem: Metabolic/Fluid and Electrolytes - Adult  Goal: Electrolytes maintained within normal limits  Outcome: Progressing  Goal: Hemodynamic stability and optimal renal function maintained  Outcome: Progressing  Goal: Glucose maintained within prescribed range  Outcome: Progressing

## 2023-02-13 NOTE — FLOWSHEET NOTE
Attempted to start IV, patient yelling out and refusing care. States \"go away and leave me alone. \" Niece at bedside.

## 2023-02-13 NOTE — OP NOTE
Operative Note      Patient: Kay Saleh  YOB: 1940  MRN: [id number]    Date of Procedure: 2/12/2023    Pre-Op Diagnosis: Displaced left femoral neck fracture, history of fall, osteopenia, hypoalbuminemia suggesting malnourished state    Post-Op Diagnosis: Same       Procedure(s):  1. LEFT HIP HEMIARTHROPLASTY FOR TREATMENT OF FEMORAL NECK FRACTURE  2. PROPHYLACTIC STABILIZATION OF LEFT FEMUR    Surgeon(s): Lorena Tejeda MD    Assistant:   First Assistant: Lynnann Ormond    Anesthesia: MAC, regional    Estimated Blood Loss (mL): 562    Complications: None    Specimens:   * No specimens in log *    Implants:  Implant Name Type Inv. Item Serial No.  Lot No. LRB No. Used Action   CABLE SURG DIA1. 7MM S STL HA CERCLAGE W/ CRMP [id number]] DEPUY SYNTHES [facility]] - [id number]  CABLE SURG DIA1. 7MM S STL HA CERCLAGE W/ CRMP [id number]] DEPUY [employer] [facility]]  DEPUY SYNTHES [place] [id number] Left 1 Implanted   CEMENT BNE 40 GM RADIOPAQUE BA SIMPLEX P - [id number]  CEMENT BNE 40 GM RADIOPAQUE BA SIMPLEX P  [employer] [facility] [id number] Left 2 Implanted   HEAD FEM OD45MM ID26MM HIP CO CHROM POLYETH BPLR CEMENTLESS - [id number]  HEAD FEM OD45MM ID26MM HIP CO CHROM POLYETH BPLR CEMENTLESS  [employer] [facility] [id number] Left 1 Implanted   HEAD FEM [id number] +4MM OFFSET HIP VIT POLYETH NK V40 TAPR - [id number]  HEAD FEM [id number] +4MM OFFSET HIP VIT POLYETH NK V40 TAPR  [employer] [facility] [id number] Left 1 Implanted   STEM FEM SZ 7 L158MM NK L40MM 52MM OFFSET 127DEG PROX DST - [id number]  STEM FEM SZ 7 L158MM NK L40MM 52MM OFFSET 127DEG PROX DST  [employer] [facility] [id number] Left 1 Implanted   SPACER FEM L [id number] DST HIP CO CHROM RNG STYL FOR SZ 6 AND - [id number]  SPACER FEM L [id number] DST HIP CO CHROM RNG STYL FOR SZ 6 AND  [employer] [facility] [id number] Left 1 Implanted         Drains: none    Findings: displaced left femoral neck fracture, osteopenia    Detailed Description of Procedure:     IMPLANTS:  Femoral component: Accolade C 127 degree size #[id number]  Femoral head: Universal head bipolar component outer diameter 45 mm, inner diameter 26 mm, V 40 femoral head outer diameter 26 mm +4 offset  Accolade C distal spacer size large  Simplex P bone cement  Synthes 1.7 mm cerclage cable     SURGICAL DETAILS:  1) Incision type: Posterolateral  2) Incision length: Approximately 20 cm  3) Muscle repair:  External rotators  4) Case duration: Approximately 1 and half hours  5) Estimated blood loss: 150 cc  6) Crystalloid replacement: 1L  7) Anesthesia type: MAC with regional block  8) Specimens removed: Femoral head with normal gross appearance therefore not sent for pathologic review  9) Preoperative antibiotics: Cefazolin  10) TXA: perioperative txa was given    INDICATIONS FOR PROCEDURE:  This patient presents after unwitnessed fall having presented to the emergency department yesterday. She was found to have a displaced left femoral neck fracture. The patient has severe dementia and lives in a care facility. I had a lengthy discussion with her niece who is her POA. I discussed the nature of her injuries. We discussed the options for treatment including the option for doing nothing at all, palliative care, non-operative treatment, hip hemiarthroplasty versus total hip replacement. After shared decision-making her niece wished to proceed with left hip hemiarthroplasty possible total hip replacement. Risks, complications, and benefits of the procedure have been discussed preoperatively to include but not limited to infection, bleeding, anesthesia risks, sciatic nerve palsy, instability, limb length discrepancy, aseptic loosening, osteolysis, DVT, continued pain, iatrogenic fracture, MI, stroke, and death. We also discussed the risks of cement implantation syndrome given that my plan was to cement the femoral component to prevent the risk of subsequent periprosthetic femur fracture.   The patient completed preoperative medical and cardiology evaluation. She was deemed an acceptable candidate for surgery. PROCEDURE:  The patient was seen in the preoperative holding area. The operative site was confirmed and marked. SCD was placed on the nonoperative leg. The risks, benefits, and alternatives to surgery were again confirmed with the patient/her POA and and they wished to proceed. The patient was brought to the operating room and placed on the operating room table in the supine position. After anesthesia was established, the patient was positioned in the lateral decubitus position with the operative side up. All bony prominences were padded. A surgical time out was taken to confirm the operative side and planned procedure. The patient was given prophylactic antibiotics with cefazolin. The hip was prepped and draped in the usual sterile fashion. A posterior approach to the hip was performed. The incision was carried through the subcutaneous tissue to the underlying fascia dl and gluteus imtiaz fascia, which were incised. The Charnley retractor was placed after carefully palpating the sciatic nerve which was protected throughout the case. The piriformis was released. The posterior capsule with the external rotators was incised maintaining a rectangular flap. Attention was turned to prophylactically stabilizing the femoral shaft to help prevent iatrogenic femoral shaft fracture as well as prevent subsequent periprosthetic fracture after discharge. The vastus lateralis was elevated with a subvastus approach to the femur and blunt Hohmann retractors were placed to allow for the subsequent careful passage of a cerclage cable around the femoral shaft. I confirmed that a cable was placed directly on bone and that there was no entrapment of the surrounding neurovascular structures. The cerclage cable was then tensioned to the manufactures recommended tension.   The remainder of the cable was cut. This completed our prophylactic stabilization of the femoral shaft. The femoral neck fracture was then explored and the femoral head was removed using a corkscrew. The femoral neck cut was freshened and made according to the preoperative plan. The acetabulum was exposed to remove the pulvinar. I preserved the intact labrum. There was no significant osteoarthritic change. I sized the femoral head by measuring it on the back table and used a head trial and was satisfied with the 45 mm head size. Attention was then turned to preparing the femoral stem. Femoral preparation was started with a box osteotome and Charnley awl. The canal was sequentially broached to a stable fit. The final broach was impacted into position in approximately 20 degrees of anteversion. Calcar planner was used. The trial head was placed and the hip reduced. Leg length measurements on the table demonstrated good reconstruction of the limb lengths according to our preoperative plan. The hip stability was assessed. The hip was stable in extension and external rotation as well as in flexion, adduction of 20 degrees and internal rotation to 80 degrees. The hip was dislocated, the trial broach was removed, the canal irrigated with pulsatile lavage. A cement restrictor was placed and then the canal was irrigated again after using the canal brush. The canal was subsequently dried using a tampon and suction, and after final stem was then cemented in routine fashion and held in place in the correct version until the cement had hardened. Again, leg length assessment and stability assessment of the hip were performed to confirm optimal component selection. The wound was irrigated with saline and hemostasis obtained. Vancomycin powder and gentamycin powder were placed into the hip incision. The posterior capsule and external rotators were repaired utilizing the #5 Fiberwire.  The piriformis was repaired with #5 Fiberwire through the gluteus medius insertion into the greater trochanter. The fascia dl/IT band and subcutaneous tissue was closed in layers with #5 fiberwire, #1 vicryl, then 2-0 vicryl followed by 3-0 Stratafix skin closure using a running subcuticular. Dermabond Prineo dressing was then placed allowed to dry and then followed by placement of the Aquacel dressing. Given the patients advanced dementia I reinforced the dressing with Ioban dressing to prevent her from pulling off the dressing during the post operative period. Abduction pillow was placed and a SCD to the operative limb. The patient tolerated the procedure well and was taken to the recovery room in good condition. Complications: None. Sponge, instrument, and needle counts were correct x 2. I was present for the entire procedure. I checked her leg lengths and I was happy with how they matched up clinically before leaving the OR. I spoke to her niece/POA postoperatively in person and updated them on her surgery as well as postoperative expectations. Postoperative plan    She will be weightbearing as tolerated on the left lower extremity  She will have posterior hip precautions and will use an abduction pillow while in bed  I would expect that she would be able to work with physical therapy and Occupational Therapy   She has a dressing that will remain on until I see her back in the office on 3/1/23 for routine follow-up. It does not need to be changed unless there is significant drainage. For DVT prophylaxis I recommend Lovenox for 35 days.     Electronically signed by Devin Guy MD on 2/13/2023 at 1:43 PM

## 2023-02-14 VITALS
RESPIRATION RATE: 16 BRPM | DIASTOLIC BLOOD PRESSURE: 48 MMHG | HEIGHT: 62 IN | WEIGHT: 130 LBS | TEMPERATURE: 98.4 F | HEART RATE: 81 BPM | BODY MASS INDEX: 23.92 KG/M2 | SYSTOLIC BLOOD PRESSURE: 115 MMHG | OXYGEN SATURATION: 100 %

## 2023-02-14 LAB
ANION GAP SERPL CALCULATED.3IONS-SCNC: 10 MEQ/L (ref 9–15)
BUN BLDV-MCNC: 9 MG/DL (ref 8–23)
CALCIUM SERPL-MCNC: 8.5 MG/DL (ref 8.5–9.9)
CHLORIDE BLD-SCNC: 105 MEQ/L (ref 95–107)
CO2: 25 MEQ/L (ref 20–31)
CREAT SERPL-MCNC: 0.74 MG/DL (ref 0.5–0.9)
GFR SERPL CREATININE-BSD FRML MDRD: >60 ML/MIN/{1.73_M2}
GLUCOSE BLD-MCNC: 100 MG/DL (ref 70–99)
HCT VFR BLD CALC: 30.1 % (ref 37–47)
HEMOGLOBIN: 10 G/DL (ref 12–16)
MCH RBC QN AUTO: 27.6 PG (ref 27–31.3)
MCHC RBC AUTO-ENTMCNC: 33.2 % (ref 33–37)
MCV RBC AUTO: 83.1 FL (ref 79.4–94.8)
PDW BLD-RTO: 15.7 % (ref 11.5–14.5)
PLATELET # BLD: 190 K/UL (ref 130–400)
POTASSIUM SERPL-SCNC: 3.6 MEQ/L (ref 3.4–4.9)
RBC # BLD: 3.63 M/UL (ref 4.2–5.4)
SODIUM BLD-SCNC: 140 MEQ/L (ref 135–144)
WBC # BLD: 8.8 K/UL (ref 4.8–10.8)

## 2023-02-14 PROCEDURE — 85027 COMPLETE CBC AUTOMATED: CPT

## 2023-02-14 PROCEDURE — 80048 BASIC METABOLIC PNL TOTAL CA: CPT

## 2023-02-14 PROCEDURE — 97535 SELF CARE MNGMENT TRAINING: CPT

## 2023-02-14 PROCEDURE — 6360000002 HC RX W HCPCS: Performed by: STUDENT IN AN ORGANIZED HEALTH CARE EDUCATION/TRAINING PROGRAM

## 2023-02-14 PROCEDURE — 93005 ELECTROCARDIOGRAM TRACING: CPT | Performed by: STUDENT IN AN ORGANIZED HEALTH CARE EDUCATION/TRAINING PROGRAM

## 2023-02-14 PROCEDURE — 1210000000 HC MED SURG R&B

## 2023-02-14 PROCEDURE — 2580000003 HC RX 258: Performed by: STUDENT IN AN ORGANIZED HEALTH CARE EDUCATION/TRAINING PROGRAM

## 2023-02-14 PROCEDURE — 6370000000 HC RX 637 (ALT 250 FOR IP): Performed by: STUDENT IN AN ORGANIZED HEALTH CARE EDUCATION/TRAINING PROGRAM

## 2023-02-14 PROCEDURE — 99232 SBSQ HOSP IP/OBS MODERATE 35: CPT | Performed by: PHYSICIAN ASSISTANT

## 2023-02-14 PROCEDURE — 36415 COLL VENOUS BLD VENIPUNCTURE: CPT

## 2023-02-14 RX ADMIN — Medication 1 TABLET: at 21:07

## 2023-02-14 RX ADMIN — DONEPEZIL HYDROCHLORIDE 5 MG: 5 TABLET, FILM COATED ORAL at 10:12

## 2023-02-14 RX ADMIN — SENNOSIDES AND DOCUSATE SODIUM 2 TABLET: 50; 8.6 TABLET ORAL at 10:10

## 2023-02-14 RX ADMIN — ENOXAPARIN SODIUM 30 MG: 100 INJECTION SUBCUTANEOUS at 10:10

## 2023-02-14 RX ADMIN — Medication 1 TABLET: at 10:12

## 2023-02-14 RX ADMIN — SODIUM CHLORIDE, PRESERVATIVE FREE 10 ML: 5 INJECTION INTRAVENOUS at 21:11

## 2023-02-14 RX ADMIN — POLYETHYLENE GLYCOL 3350 17 G: 17 POWDER, FOR SOLUTION ORAL at 10:10

## 2023-02-14 RX ADMIN — ACETAMINOPHEN 650 MG: 325 TABLET ORAL at 04:09

## 2023-02-14 RX ADMIN — QUETIAPINE FUMARATE 12.5 MG: 25 TABLET ORAL at 10:12

## 2023-02-14 RX ADMIN — ACETAMINOPHEN 650 MG: 325 TABLET ORAL at 21:10

## 2023-02-14 RX ADMIN — KETOCONAZOLE: 20 CREAM TOPICAL at 23:13

## 2023-02-14 RX ADMIN — ACETAMINOPHEN 650 MG: 325 TABLET ORAL at 10:10

## 2023-02-14 RX ADMIN — ENOXAPARIN SODIUM 30 MG: 100 INJECTION SUBCUTANEOUS at 21:07

## 2023-02-14 ASSESSMENT — PAIN DESCRIPTION - LOCATION
LOCATION: LEG
LOCATION: HIP

## 2023-02-14 ASSESSMENT — PAIN DESCRIPTION - DESCRIPTORS
DESCRIPTORS: ACHING;DISCOMFORT
DESCRIPTORS: ACHING

## 2023-02-14 ASSESSMENT — PAIN DESCRIPTION - ORIENTATION
ORIENTATION: LEFT
ORIENTATION: LEFT

## 2023-02-14 ASSESSMENT — PAIN SCALES - GENERAL
PAINLEVEL_OUTOF10: 5
PAINLEVEL_OUTOF10: 5

## 2023-02-14 NOTE — PROGRESS NOTES
TRAUMA DAILY PROGRESS NOTE      Patient Name: Audra Jean  Admission Date 2023    Hospital Day: 3  Patient seen and examined on 2023    INTERVAL HISTORY/EVENTS     Background: Audra Jean is a 80 y.o. female with past medical history of dementia presents as a CAT2 trauma s/p being found down at the nursing home (?)headstrike, (?)LOC, (-)AC,AP. Trauma work up reveals a left femoral neck fracture. Patient was admitted to the [facility] for OR fixation, PT/OT and pain control. Hospital Course:  2023: S/P being found down at nursing home and endorsing left hip pain. Found to have left femoral neck fracture  2023: OR with orthopedics. Returned to [facility] post-op. 2023: Tachycardic. Placed on tele. Pt refusing IVF and EKG. 24 Hour Events:  Tachycardic overnight, refusing EKG. Tolerating PO intake without nausea or vomiting. Voiding spontaneously. No bowel movement since admission. Vital signs reviewed: Afebrile, Tachycardic (-115), DBP in 50's, sating appropriately on RA. Lab work reviewed. No leukocytosis. Stabilizing H&H to 10.2/31.1-> 10.0/30. 1. Platelets normal.     UOP: Not recorded  BM x 0 since admit ()      PHYSICAL EXAM     Vitals Average, Min and Max for last 24 hours:  Temp: Temp: 99 °F (37.2 °C); Temp  Av.2 °F (37.3 °C)  Min: 99 °F (37.2 °C)  Max: 99.7 °F (37.6 °C)  Respirations: Resp  Av.7  Min: 16  Max: 18  Pulse: Pulse  Av.8  Min: 92  Max: 115  Blood Pressure: Systolic (29WKB), TEQ:689 , Min:126 , OCX:483   ; Diastolic (14VJG), ULV:82, Min:52, Max:53  SpO2: SpO2  Av %  Min: 97 %  Max: 99 %    Vitals: BP (!) 127/53   Pulse 92   Temp 99 °F (37.2 °C) (Oral)   Resp 16   Ht 5' 2\" (1.575 m)   Wt 130 lb (59 kg)   SpO2 99%   BMI 23.78 kg/m²     Physical Exam:  Constitutional: Sitting upright in bedside chair eating lunch. Daughter at bedside. HEENT: Atraumatic normocephalic. Cardiovascular: Regular rhythm, tachycardic.  Bilateral radial and DP pulses intact   Pulmonary: Breathing comfortably on RA. Abdominal: Soft. Non-distended. Non-tender. Musculoskeletal: Good ROM in all extremities. Surgical dressing in place to left hip with minimal strike through. Thigh is edematous but compartments compressible  Neurological: A&O to self Motor and sensory grossly intact. No focal deficits.    Skin: Warm, dry     LABORATORY RESULTS (LAST 24 HOURS)     CBC with Differential:    Lab Results   Component Value Date/Time    WBC 8.8 02/14/2023 05:27 AM    RBC 3.63 02/14/2023 05:27 AM    HGB 10.0 02/14/2023 05:27 AM    HCT 30.1 02/14/2023 05:27 AM     02/14/2023 05:27 AM    MCV 83.1 02/14/2023 05:27 AM    MCH 27.6 02/14/2023 05:27 AM    MCHC 33.2 02/14/2023 05:27 AM    RDW 15.7 02/14/2023 05:27 AM    LYMPHOPCT 24.8 02/11/2023 06:00 PM    MONOPCT 12.8 02/11/2023 06:00 PM    EOSPCT 0.0 01/13/2021 12:00 AM    BASOPCT 0.7 02/11/2023 06:00 PM    MONOSABS 0.7 02/11/2023 06:00 PM    LYMPHSABS 1.4 02/11/2023 06:00 PM    EOSABS 0.2 02/11/2023 06:00 PM    BASOSABS 0.0 02/11/2023 06:00 PM     CMP:    Lab Results   Component Value Date/Time     02/14/2023 05:27 AM    K 3.6 02/14/2023 05:27 AM    K 3.9 01/22/2022 06:37 AM     02/14/2023 05:27 AM    CO2 25 02/14/2023 05:27 AM    BUN 9 02/14/2023 05:27 AM    CREATININE 0.74 02/14/2023 05:27 AM    GFRAA >60.0 01/22/2022 06:37 AM    LABGLOM >60.0 02/14/2023 05:27 AM    GLUCOSE 100 02/14/2023 05:27 AM    PROT 6.3 02/11/2023 06:00 PM    LABALBU 3.4 02/11/2023 06:00 PM    CALCIUM 8.5 02/14/2023 05:27 AM    BILITOT 0.3 02/11/2023 06:00 PM    ALKPHOS 93 02/11/2023 06:00 PM    AST 20 02/11/2023 06:00 PM    ALT 15 02/11/2023 06:00 PM     Magnesium:  No results found for: MG  PT/INR:    Lab Results   Component Value Date/Time    PROTIME 13.1 02/11/2023 06:09 PM    INR 1.0 02/11/2023 06:09 PM     PTT:    Lab Results   Component Value Date/Time    APTT 30.6 02/11/2023 06:09 PM       IMAGING RESULTS (PERSONALLY REVIEWED)     No new imaging to review. ASSESSMENT & PLAN     Diagnoses:  Found down with presumed fall  Left femoral neck fracture (ortho, s/p L hemiarthroplasty 2/12)  Acute pain due to trauma  Acute post-op pain   Acute blood loss anemia  Tachycardia    PMHx: Dementia     Incidental Findings: None, reviewed by [facility] 2/13/23      ASSESSMENT/PLAN:  Neurological: Acute pain due to trauma, acute post-op pain. Hx dementia  - Continue Tylenol 650mg q6hr scheduled  - Continue Oxycodone 2.5/5mg q4hr prn mod/severe pain  - Continue home donepezil 5mg daily  - Continue home seroquel 12.5mg daily     Cardiovascular: Tachycardic this AM with seemingly regular rhythm. Per family, no hx of afib  - Vital signs per unit protocol  - Obtain a EKG for intermittent tachycardia. - Telemetry     Respiratory: Sating appropriately on RA  - Maintain O2 sats > 92%  - Encourage IS 10x hourly     GI/Diet: Tolerating PO intake, No bowel movement since admission   - Regular diet  - Bowel regimen: senokot BID, miralax daily     Renal/Electrolytes: No acute electrolyte abnormalities, BUN/Cr within normal limits on last BMP  - HLIV.  - AM BMP     ID: No leukocytosis, afebrile  - Liliana-op abx only     Heme: Down trend in H&H  - No indication for transfusion  - AM CBC     Endocrine: No acute or chronic concerns       MSK: Left femoral neck fracture s/p left hemiarthroplasty   - Spines: Clear  - Weight Bearing Restrictions: WBAT  - Activity: Ambulatory with assistance  - PT/OT: Consulted, rec return to SNF    Prophylaxis:   - SCDs and Lovenox 30mg BID for VTE PPx    Lines/Tubes/Drains:  - Maintain PIVs  - No indication for harris catheter, monitor for urinary retention    Dispo: Remain on RNF for monitoring of tachycardia and trending of H&H. Anticipate return to assisted living tomorrow.   Accom Status: General Status      - Follow up with Orthopedics [facility] in 2 weeks for post-op check  - Follow up with PCP for chronic medical conditions  - No trauma follow up indicated       Please call for any questions or concerns.   Gopal Lieberman Treatment Team Sticky Note for contact information]      Meredith Cordova PA-C  Trauma/Critical Care  [see Treatment Team Sticky Note for contact information]     This patient's plan of care was discussed and made in collaboration with Trauma Attending physician, Liz Rangel MD.

## 2023-02-14 NOTE — CARE COORDINATION
FELISHA met with the staff from [facility] and they completed an on site evaluation and feel that they will be able to meet the needs of the pt in her familiar environment. Updated notes were provided to staff and they are prepared for the pt to return to them today if medically cleared. They ask that Lovenox be given prior to transfer back to them as it will take 24 hours to get any new meds. Nursing updated.

## 2023-02-14 NOTE — PROGRESS NOTES
Physical Therapy Med Surg Daily Treatment Note  Facility/Department: [facility]  Room: 30/A772-38       NAME: Marek Natarajan  : 1940 (54 y.o.)  MRN: [id number]  CODE STATUS: DNR-CC    Date of Service: 2023    Patient Diagnosis(es): Closed fracture of left hip, initial encounter [facility]  Fall, initial encounter [W19. XXXA]  Closed left hip fracture, initial encounter [facility]   Chief Complaint   Patient presents with    Fall    Hip Pain     Left hip     Patient Active Problem List    Diagnosis Date Noted    Acute pain due to trauma 2023    Acute post-operative pain 2023    Acute blood loss anemia 2023    Tachycardia 2023    Osteopenia of left thigh 2023    Fall 2023    Closed left hip fracture, initial encounter ([facility] [address] 2023    Vitamin D deficiency 2022    History of COVID-19 2022    Late onset Alzheimer's dementia without behavioral disturbance ([facility] [address] 2022    Lumbar transverse process fracture, closed, initial encounter ([facility] [address]     Closed fracture of sacrum, initial encounter ([facility] [address] 2022        Past Medical History:   Diagnosis Date    Asymptomatic varicose veins of both lower extremities     Dementia with behavioral disturbance     Elevated blood pressure reading     Full dentures     History of hip fracture     Small vessel disease, cerebrovascular      Past Surgical History:   Procedure Laterality Date    HIP FRACTURE SURGERY         Chart Reviewed: Yes  Family / Caregiver Present: Yes  Diagnosis: Closed left hip fracture, initial encounter [facility]) s/p hemiarthroplasy 23  General Comment  Comments: POD #2 L hip makrie; WBAT; Hip precautions    Restrictions:  Restrictions/Precautions: Fall Risk;Up as Tolerated;Weight Bearing  Lower Extremity Weight Bearing Restrictions  Left Lower Extremity Weight Bearing: Weight Bearing As Tolerated  Position Activity Restriction  Hip Precautions: Posterior hip precautions    SUBJECTIVE:   Subjective: OK, I'll get back to bed. Pain  Pain: Denies pre and post session pain. Does wince during active mobility, however improves with rest. RN notified. OBJECTIVE:   Orientation  Orientation Level: Oriented to person  Cognition  Following Commands: Inconsistently follows commands  Attention Span: Difficulty attending to directions  Memory: Decreased recall of recent events;Decreased recall of precautions  Initiation: Requires cues for all  Sequencing: Requires cues for all    Bed mobility  Sit to Supine: Maximum assistance;2 Person assistance  Scooting: Dependent/Total  Bed Mobility Comments: bed flat with no use of hand rails; Increased cues for attention to task. Step by step sequencing for all mobility    Transfers  Sit to Stand: Moderate Assistance;2 Person Assistance  Stand to Sit: 2 Person Assistance; Moderate Assistance  Bed to Chair: Maximum assistance;2 Person Assistance  Comment: vc's for technique and hand placement; Pt stood with less lifting assist however required increased cuing to complete transfer to bed. Step by step cuing for sequencing with poor follow through. Assist required for weight shifting. Pt fearful    Ambulation  Comments: transfer to bed only. Activity Tolerance  Activity Tolerance: Patient limited by pain;Treatment limited secondary to decreased cognition          ASSESSMENT   Assessment: pt limited by cognition but able to progress to transfers however required constant cues for sequencing.      Discharge Recommendations:  Continue to assess pending progress, Patient would benefit from continued therapy after discharge         Goals  Long Term Goals  Long Term Goal 1: Pt to complete bed mobility with Zina  Long Term Goal 2: Pt to complete transfers with Zina  Long Term Goal 3: Pt to stand at [employer] X 3min with Zina  Long Term Goal 4: Pt to maintain unsupported sitting X 5min with supervision    PLAN    General Plan: 1 time a day 3-6 times a week  Safety Devices  Type of Devices: Call light within reach, All fall risk precautions in place, Bed alarm in place, Left in bed     AMPAC (6 CLICK) BASIC MOBILITY  AM-PAC Inpatient Mobility Raw Score : 9      Therapy Time   Individual   Time In 1415   Time Out 1427   Minutes 12      Bm/Trsf - 12 mins       April Valdes PTA, 02/14/23 at 2:37 PM         Definitions for assistance levels  Independent = pt does not require any physical supervision or assistance from another person for activity completion. Device may be needed.   Stand by assistance = pt requires verbal cues or instructions from another person, close to but not touching, to perform the activity  Minimal assistance= pt performs 75% or more of the activity; assistance is required to complete the activity  Moderate assistance= pt performs 50% of the activity; assistance is required to complete the activity  Maximal assistance = pt performs 25% of the activity; assistance is required to complete the activity  Dependent = pt requires total physical assistance to accomplish the task

## 2023-02-14 NOTE — PROGRESS NOTES
Physical Therapy Med Surg Daily Treatment Note  Facility/Department: [address] ORTHO TELE  [facility]       NAME: Mary Bran  : 1940 (82 y.o.)  MRN: [id number]  CODE STATUS: DNR-CC    Date of Service: 2023    Patient Diagnosis(es): Closed fracture of left hip, initial encounter ([facility]) [S72.002A]  Fall, initial encounter [W19.XXXA]  Closed left hip fracture, initial encounter ([facility]) [S72.002A]   Chief Complaint   Patient presents with    Fall    Hip Pain     Left hip     Patient Active Problem List    Diagnosis Date Noted    Acute pain due to trauma 2023    Acute post-operative pain 2023    Acute blood loss anemia 2023    Tachycardia 2023    Osteopenia of left thigh 2023    Fall 2023    Closed left hip fracture, initial encounter ([facility]) 2023    Vitamin D deficiency 2022    History of COVID-19 2022    Late onset Alzheimer's dementia without behavioral disturbance ([facility]) 2022    Lumbar transverse process fracture, closed, initial encounter ([facility])     Closed fracture of sacrum, initial encounter ([facility]) 2022        Past Medical History:   Diagnosis Date    Asymptomatic varicose veins of both lower extremities     Dementia with behavioral disturbance     Elevated blood pressure reading     Full dentures     History of hip fracture     Small vessel disease, cerebrovascular      Past Surgical History:   Procedure Laterality Date    HIP FRACTURE SURGERY         Chart Reviewed: Yes  Family / Caregiver Present: Yes  Diagnosis: Closed left hip fracture, initial encounter ([facility]) s/p hemiarthroplasy 23    Restrictions:  Restrictions/Precautions: Fall Risk;Up as Tolerated;Weight Bearing  Lower Extremity Weight Bearing Restrictions  Left Lower Extremity Weight Bearing: Weight Bearing As Tolerated  Position Activity Restriction  Hip Precautions: Posterior hip precautions    SUBJECTIVE:   Subjective: \"I will try.\"    Pain  Pain: Denies pre and  post session pain. Does wince during active mobility, however improves with rest. RN notified. OBJECTIVE:        Bed mobility  Supine to Sit: Maximum assistance;2 Person assistance (max cues for sequencing and maintaining Posterior hip precautions.)  Sit to Supine:  (DNT pt into bedside chair to promote OOB activity.)  Bed Mobility Comments: Increased time and effort. Pt with difficulty following sequencing for effective completion requiring additional assist.    Transfers  Sit to Stand: Maximum Assistance;2 Person Assistance  Stand to Sit: Maximum Assistance;2 Person Assistance  Bed to Chair: Maximum assistance;2 Person Assistance  Comment: with [employer], assistance needed to kick LLE out during STS to maintain hip precautions, Max time and effort to complete transfer to chair, pt becomes anxious and fearful limiting ability to follow cues and complete transfer efficicently. Ambulation  Comments: transfer to bedside chair only. PT Exercises  A/AROM Exercises: supine AP/HS x10, pt unable to follow cues for QS. Activity Tolerance  Activity Tolerance: Treatment limited secondary to decreased cognition          ASSESSMENT   Assessment: pt limited by cognition but able to progress to transfers into bedside chair. pt's daughter present during tx and reports pt was ambulating at AL prior to arrival here.      Discharge Recommendations:  Continue to assess pending progress, Patient would benefit from continued therapy after discharge         Goals  Long Term Goals  Long Term Goal 1: Pt to complete bed mobility with Zina  Long Term Goal 2: Pt to complete transfers with Zina  Long Term Goal 3: Pt to stand at [employer] X 3min with Zina  Long Term Goal 4: Pt to maintain unsupported sitting X 5min with supervision    PLAN    General Plan: 1 time a day 3-6 times a week  Safety Devices  Type of Devices: Call light within reach, Chair alarm in place, Left in chair     [facility] (6 CLICK) [facility] Inpatient Mobility Raw Score : 9      Therapy Time   Individual   Time In 1100   Time Out 1129   Minutes 29      BM/Trsf: 25  Therex: 4        Monika Pabon HIRA, 02/14/23 at 11:53 AM         Definitions for assistance levels  Independent = pt does not require any physical supervision or assistance from another person for activity completion. Device may be needed.   Stand by assistance = pt requires verbal cues or instructions from another person, close to but not touching, to perform the activity  Minimal assistance= pt performs 75% or more of the activity; assistance is required to complete the activity  Moderate assistance= pt performs 50% of the activity; assistance is required to complete the activity  Maximal assistance = pt performs 25% of the activity; assistance is required to complete the activity  Dependent = pt requires total physical assistance to accomplish the task

## 2023-02-15 PROBLEM — S72.002A CLOSED LEFT HIP FRACTURE, INITIAL ENCOUNTER (HCC): Status: RESOLVED | Noted: 2023-02-11 | Resolved: 2023-02-15
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
## 2023-02-15 PROBLEM — R00.0 TACHYCARDIA: Status: RESOLVED | Noted: 2023-02-13 | Resolved: 2023-02-15

## 2023-02-15 PROBLEM — D62 ACUTE BLOOD LOSS ANEMIA: Status: RESOLVED | Noted: 2023-02-13 | Resolved: 2023-02-15

## 2023-02-15 LAB
ANION GAP SERPL CALCULATED.3IONS-SCNC: 14 MEQ/L (ref 9–15)
BUN BLDV-MCNC: 9 MG/DL (ref 8–23)
CALCIUM SERPL-MCNC: 8.6 MG/DL (ref 8.5–9.9)
CHLORIDE BLD-SCNC: 106 MEQ/L (ref 95–107)
CO2: 24 MEQ/L (ref 20–31)
CREAT SERPL-MCNC: 0.61 MG/DL (ref 0.5–0.9)
EKG ATRIAL RATE: 144 BPM
EKG Q-T INTERVAL: 336 MS
EKG QRS DURATION: 70 MS
EKG QTC CALCULATION (BAZETT): 525 MS
EKG R AXIS: 77 DEGREES
EKG T AXIS: 53 DEGREES
EKG VENTRICULAR RATE: 147 BPM
GFR SERPL CREATININE-BSD FRML MDRD: >60 ML/MIN/{1.73_M2}
GLUCOSE BLD-MCNC: 96 MG/DL (ref 70–99)
HCT VFR BLD CALC: 31.2 % (ref 37–47)
HEMOGLOBIN: 10.4 G/DL (ref 12–16)
MCH RBC QN AUTO: 27.6 PG (ref 27–31.3)
MCHC RBC AUTO-ENTMCNC: 33.2 % (ref 33–37)
MCV RBC AUTO: 83.2 FL (ref 79.4–94.8)
PDW BLD-RTO: 15.7 % (ref 11.5–14.5)
PLATELET # BLD: 250 K/UL (ref 130–400)
POTASSIUM SERPL-SCNC: 3.2 MEQ/L (ref 3.4–4.9)
RBC # BLD: 3.75 M/UL (ref 4.2–5.4)
SARS-COV-2, NAAT: NOT DETECTED
SODIUM BLD-SCNC: 144 MEQ/L (ref 135–144)
WBC # BLD: 8.2 K/UL (ref 4.8–10.8)

## 2023-02-15 PROCEDURE — 99239 HOSP IP/OBS DSCHRG MGMT >30: CPT | Performed by: PHYSICIAN ASSISTANT

## 2023-02-15 PROCEDURE — 6370000000 HC RX 637 (ALT 250 FOR IP): Performed by: STUDENT IN AN ORGANIZED HEALTH CARE EDUCATION/TRAINING PROGRAM

## 2023-02-15 PROCEDURE — 87635 SARS-COV-2 COVID-19 AMP PRB: CPT

## 2023-02-15 PROCEDURE — 85027 COMPLETE CBC AUTOMATED: CPT

## 2023-02-15 PROCEDURE — 97535 SELF CARE MNGMENT TRAINING: CPT

## 2023-02-15 PROCEDURE — 6370000000 HC RX 637 (ALT 250 FOR IP): Performed by: PHYSICIAN ASSISTANT

## 2023-02-15 PROCEDURE — 36415 COLL VENOUS BLD VENIPUNCTURE: CPT

## 2023-02-15 PROCEDURE — 80048 BASIC METABOLIC PNL TOTAL CA: CPT

## 2023-02-15 PROCEDURE — 97110 THERAPEUTIC EXERCISES: CPT

## 2023-02-15 PROCEDURE — 6360000002 HC RX W HCPCS: Performed by: STUDENT IN AN ORGANIZED HEALTH CARE EDUCATION/TRAINING PROGRAM

## 2023-02-15 PROCEDURE — 2580000003 HC RX 258: Performed by: STUDENT IN AN ORGANIZED HEALTH CARE EDUCATION/TRAINING PROGRAM

## 2023-02-15 RX ORDER — ENOXAPARIN SODIUM 100 MG/ML
30 INJECTION SUBCUTANEOUS 2 TIMES DAILY
Qty: 16.8 ML | Refills: 0 | Status: SHIPPED | OUTPATIENT
Start: 2023-02-15 | End: 2023-03-15

## 2023-02-15 RX ORDER — OXYCODONE HYDROCHLORIDE 5 MG/1
5 TABLET ORAL EVERY 6 HOURS PRN
Qty: 20 TABLET | Refills: 0 | Status: SHIPPED | OUTPATIENT
Start: 2023-02-15 | End: 2023-02-15 | Stop reason: SDUPTHER

## 2023-02-15 RX ORDER — SENNA AND DOCUSATE SODIUM 50; 8.6 MG/1; MG/1
2 TABLET, FILM COATED ORAL 2 TIMES DAILY
Qty: 56 TABLET | Refills: 0 | Status: SHIPPED | OUTPATIENT
Start: 2023-02-15 | End: 2023-03-01

## 2023-02-15 RX ORDER — OXYCODONE HYDROCHLORIDE 5 MG/1
5 TABLET ORAL EVERY 6 HOURS PRN
Qty: 20 TABLET | Refills: 0 | Status: SHIPPED | OUTPATIENT
Start: 2023-02-15 | End: 2023-02-20

## 2023-02-15 RX ORDER — POTASSIUM CHLORIDE 20 MEQ/1
40 TABLET, EXTENDED RELEASE ORAL ONCE
Status: COMPLETED | OUTPATIENT
Start: 2023-02-15 | End: 2023-02-15

## 2023-02-15 RX ADMIN — Medication 1 TABLET: at 09:11

## 2023-02-15 RX ADMIN — DONEPEZIL HYDROCHLORIDE 5 MG: 5 TABLET, FILM COATED ORAL at 09:11

## 2023-02-15 RX ADMIN — SODIUM CHLORIDE, PRESERVATIVE FREE 10 ML: 5 INJECTION INTRAVENOUS at 09:12

## 2023-02-15 RX ADMIN — ENOXAPARIN SODIUM 30 MG: 100 INJECTION SUBCUTANEOUS at 09:11

## 2023-02-15 RX ADMIN — ACETAMINOPHEN 650 MG: 325 TABLET ORAL at 11:47

## 2023-02-15 RX ADMIN — Medication 1000 UNITS: at 09:12

## 2023-02-15 RX ADMIN — OXYCODONE 5 MG: 5 TABLET ORAL at 09:13

## 2023-02-15 RX ADMIN — QUETIAPINE FUMARATE 12.5 MG: 25 TABLET ORAL at 09:12

## 2023-02-15 RX ADMIN — KETOCONAZOLE: 20 CREAM TOPICAL at 09:19

## 2023-02-15 RX ADMIN — POTASSIUM CHLORIDE 40 MEQ: 1500 TABLET, EXTENDED RELEASE ORAL at 09:11

## 2023-02-15 ASSESSMENT — PAIN SCALES - GENERAL
PAINLEVEL_OUTOF10: 0
PAINLEVEL_OUTOF10: 7

## 2023-02-15 ASSESSMENT — PAIN DESCRIPTION - LOCATION: LOCATION: INCISION

## 2023-02-15 NOTE — DISCHARGE SUMMARY
[address]  [address]  [place], [address] [facility]  MRN: [id number]  YOB: 1940  80 y. o.female      Attending  Bianca Shaw MD ?   Date of Admission  2/11/2023 Date of Discharge  2/15/2023      ? DIAGNOSES:  Principal Problem (Resolved):    Closed left hip fracture, initial encounter [facility])  Active Problems:    Osteopenia of left thigh    Acute pain due to trauma    Acute post-operative pain  Resolved Problems:    Fall    Acute blood loss anemia    Tachycardia       PROCEDURES:  2/12/2023: Orthopedic Surgery, by Finn Thompson MD  LEFT HIP HEMIARTHROPLASTY FOR TREATMENT OF FEMORAL NECK FRACTURE  2.   PROPHYLACTIC STABILIZATION OF LEFT FEMUR   ? DISCHARGE MEDICATIONS:  Current Discharge Medication List             Details   enoxaparin Sodium (LOVENOX) 30 MG/0.3ML injection Inject 0.3 mLs into the skin 2 times daily for 28 days  Qty: 16.8 mL, Refills: 0      sennosides-docusate sodium (SENOKOT-S) 8.6-50 MG tablet Take 2 tablets by mouth in the morning and at bedtime for 14 days  Qty: 56 tablet, Refills: 0      oxyCODONE (ROXICODONE) 5 MG immediate release tablet Take 1 tablet by mouth every 6 hours as needed (for severe pain only. Pain rated 8-10 out of 10.) for up to 5 days. Max Daily Amount: 20 mg  Qty: 20 tablet, Refills: 0    Comments: Reduce doses taken as pain becomes manageable  Associated Diagnoses: [facility]  Acute post-operative pain                Details   acetaminophen (TYLENOL) 325 MG tablet Take 2 tablets by mouth every 4 hours as needed for Pain  Qty: 120 tablet, Refills: 0      Lactobacillus (ACIDOPHILUS) CAPS capsule Take 1 capsule by mouth 2 times daily      vitamin D3 (CHOLECALCIFEROL) 25 MCG (1000 UT) TABS tablet Take 1,000 Units by mouth daily      ketoconazole (NIZORAL) 2 % cream Apply topically 2 times daily Apply topically daily L foot      donepezil (ARICEPT) 5 MG tablet Take 5 mg by mouth daily       QUEtiapine (SEROQUEL) 25 MG tablet Take 0.5 tablets by mouth daily  Qty: 30 tablet, Refills: 0               Current Facility-Administered Medications:     enoxaparin Sodium (LOVENOX) injection 30 mg, 30 mg, SubCUTAneous, BID, Junior Grewal PA-C, 30 mg at 02/15/23 0911    lactated ringers bolus, 500 mL, IntraVENous, Once, Junior Grewal PA-C    acetaminophen (TYLENOL) tablet 650 mg, 650 mg, Oral, Q6H, Junior Grewal PA-C, 650 mg at 02/15/23 1147    oxyCODONE (ROXICODONE) immediate release tablet 2.5 mg, 2.5 mg, Oral, Q4H PRN **OR** oxyCODONE (ROXICODONE) immediate release tablet 5 mg, 5 mg, Oral, Q4H PRN, Junior Grewal PA-C, 5 mg at 02/15/23 0913    sennosides-docusate sodium (SENOKOT-S) 8.6-50 MG tablet 2 tablet, 2 tablet, Oral, BID, Junior Grewal PA-C, 2 tablet at 02/14/23 1010    polyethylene glycol (GLYCOLAX) packet 17 g, 17 g, Oral, Daily, Junior Grewal PA-C, 17 g at 02/14/23 1010    donepezil (ARICEPT) tablet 5 mg, 5 mg, Oral, Daily, Junior Grewal PA-C, 5 mg at 02/15/23 0911    ketoconazole (NIZORAL) 2 % cream, , Topical, BID, Junior Grewal PA-C, Given at 02/15/23 0919    lactobacillus acidophilus [facility]) 1 tablet, 1 tablet, Oral, BID, Junior Grewal PA-C, 1 tablet at 02/15/23 0911    QUEtiapine (SEROQUEL) tablet 12.5 mg, 12.5 mg, Oral, Daily, Junior Grewal PA-C, 12.5 mg at 02/15/23 1466    Vitamin D (CHOLECALCIFEROL) tablet 1,000 Units, 1,000 Units, Oral, Daily, Junior Grewal PA-C, 1,000 Units at 02/15/23 0912    sodium chloride flush 0.9 % injection 5-40 mL, 5-40 mL, IntraVENous, 2 times per day, Junior Grewal PA-C, 10 mL at 02/15/23 0912    sodium chloride flush 0.9 % injection 5-40 mL, 5-40 mL, IntraVENous, PRN, Junior Grewal PA-C    0.9 % sodium chloride infusion, , IntraVENous, PRN, Junior Grewal PA-C, Last Rate: 100 mL/hr at 02/12/23 0755, New Bag at 02/12/23 0755    ondansetron (ZOFRAN-ODT) disintegrating tablet 4 mg, 4 mg, Oral, Q8H PRN **OR** ondansetron (ZOFRAN) injection 4 mg, 4 mg, IntraVENous, Q6H EARNESTN, Radha Biggs PA-C       REASON FOR HOSPITALIZATION:  Jenni Gilliland is a 80 y.o. female with past medical history of dementia presents as a CAT2 trauma s/p being found down at the nursing home (?)headstrike, (?)LOC, (-)AC,AP. Trauma work up reveals a left femoral neck fracture. Patient was admitted to the [facility] for OR fixation, PT/OT and pain control. SIGNIFICANT FINDINGS:  Catalog of Injuries:   Diagnoses:  Found down with presumed fall  Left femoral neck fracture (ortho, s/p L hemiarthroplasty 2/12)  Acute pain due to trauma  Acute post-op pain   Acute blood loss anemia  Tachycardia (resolved)     PMHx: Dementia      Incidental Findings: None, reviewed by [facility] 2/13/23    HOSPITAL COURSE:  2/11/2023: S/P being found down at nursing home and endorsing left hip pain. Found to have left femoral neck fracture  2/12/2023: OR with orthopedics. Returned to [facility] post-op. 2/13/2023: Tachycardic. Placed on tele. Pt refusing IVF and EKG.  2/14/2023: Remain on RNF for monitoring of tachycardia and trending of H&H. Anticipate return to assisted living tomorrow. 2/15/2023: Tachycardia improved. H&H stable. OK for discharge to assisted living. At time of discharge, Negro Escamilla was tolerating a regular diet, having bowel movements, and had adequate analgesia on oral pain medications. Pt's activity level was WBAT to LLE. The patient had no signs or symptoms of complications. Patient was determined stable for discharge to: Assisted Living    The patient was seen and examined on the day of discharge with the following findings:  Constitutional: Sitting upright in bedside chair eating lunch. Niece at bedside. HEENT: Atraumatic normocephalic. Cardiovascular: Regular rhythm, tachycardic. Bilateral radial and DP pulses intact   Pulmonary: Breathing comfortably on RA. Abdominal: Soft. Non-distended. Non-tender. Musculoskeletal: Good ROM in all extremities.  Surgical dressing in place to left hip with minimal strike through, reinforced with tape. Thigh is edematous but compartments compressible  Neurological: A&O to self Motor and sensory grossly intact. No focal deficits. Skin: Warm, dry            ANTICIPATED FOLLOW UP:  No future appointments. No discharge procedures on file. Other indicated follow up and instructions for scheduling:  - Follow up with Orthopedics Alexandra Byrnes) in 2 weeks for post-op check  - Follow up with PCP for chronic medical conditions  - No trauma follow up indicated      VTE RISK AT DISCHARGE:  Per trauma program protocol, patient DOES REQUIRE post-discharge VTE prophylaxis. Patient will require Lovenox 30mg BID for 4 weeks until 3/15/23. --  Herson Stover PA-C  Trauma/Critical Care/ Emergency General Surgery    [see treatment team sticky note for contact information]      >30 minutes were spent on the discharge of this patient including final examination of the patient, discussion of the hospital stay, instructions for continuing care to all relevant caregivers, preparation of discharge records, prescriptions and referral forms, and clear identification of reasons to return to clinic or to emergency room.

## 2023-02-15 NOTE — PROGRESS NOTES
Physical Therapy Med Surg Daily Treatment Note  Facility/Department: [facility]  Room: K914/B453-39       NAME: Teresa Duke  : 1940 (33 y.o.)  MRN: [id number]  CODE STATUS: DNR-CC    Date of Service: 2/15/2023    Patient Diagnosis(es): Closed fracture of left hip, initial encounter [facility]) [facility] Anna  Fall, initial encounter [W19. XXXA]  Closed left hip fracture, initial encounter [facility]) [facility] Dominga   Chief Complaint   Patient presents with    Fall    Hip Pain     Left hip     Patient Active Problem List    Diagnosis Date Noted    Acute pain due to trauma 2023    Acute post-operative pain 2023    Acute blood loss anemia 2023    Tachycardia 2023    Osteopenia of left thigh 2023    Fall 2023    Closed left hip fracture, initial encounter ([facility] 2023    Vitamin D deficiency 2022    History of COVID-19 2022    Late onset Alzheimer's dementia without behavioral disturbance ([facility] 2022    Lumbar transverse process fracture, closed, initial encounter ([facility]     Closed fracture of sacrum, initial encounter ([facility] 2022        Past Medical History:   Diagnosis Date    Asymptomatic varicose veins of both lower extremities     Dementia with behavioral disturbance     Elevated blood pressure reading     Full dentures     History of hip fracture     Small vessel disease, cerebrovascular      Past Surgical History:   Procedure Laterality Date    HIP FRACTURE SURGERY  2012    HIP SURGERY Left 2023    LEFT HIP HEMIARTHROPLASTY, PROPHYLACTIC STABILIZATION OF LEFT FEMUR performed by Molina Zaman MD at [facility]       Chart Reviewed: Yes  Family / Caregiver Present: Yes  Diagnosis: Closed left hip fracture, initial encounter [facility]) s/p hemiarthroplasy 23  General Comment  Comments: POD #3 L hip markie; WBAT; Hip precautions    Restrictions:  Restrictions/Precautions: Fall Risk;Up as Tolerated;Weight Bearing  Lower Extremity Weight Bearing Restrictions  Left Lower Extremity Weight Bearing: Weight Bearing As Tolerated  Position Activity Restriction  Hip Precautions: Posterior hip precautions    SUBJECTIVE:   Subjective: pt confused tangential, max encouragement needed for pt to participate. Pain  Pain: pt reports pain throughout but does not rate numerically. OBJECTIVE:        Bed mobility  Supine to Sit: Maximum assistance;2 Person assistance  Sit to Supine: Maximum assistance;2 Person assistance  Bed Mobility Comments: Increased cues for attention to task. Step by step sequencing for all mobility, pt resistive at times, reports increased pain with mobility. Transfers  Comment: max encouragement to attempt, pt adamantly declines repeatedly. PT Exercises  Dynamic Sitting Balance Exercises: sitting EOB perturbations, reaching OBOS and weight shifting all completed, pt demos good seated balance. Activity Tolerance  Activity Tolerance: Patient limited by pain;Treatment limited secondary to decreased cognition          ASSESSMENT   Assessment: pt limited by cognition unwilling to attempt transfers despite max encouragement from therapists and family member. pt with increased confusion this date.      Discharge Recommendations:  Continue to assess pending progress, Patient would benefit from continued therapy after discharge         Goals  Long Term Goals  Long Term Goal 1: Pt to complete bed mobility with Zina  Long Term Goal 2: Pt to complete transfers with Zina  Long Term Goal 3: Pt to stand at [employer] X 3min with Zina  Long Term Goal 4: Pt to maintain unsupported sitting X 5min with supervision    PLAN    General Plan: 1 time a day 3-6 times a week  Safety Devices  Type of Devices: Call light within reach, All fall risk precautions in place, Bed alarm in place, Left in bed     [facility] (6 CLICK) BASIC MOBILITY  AM-P[facility] Inpatient Mobility Raw Score : 9      Therapy Time   Individual   Time In 1013   Time Out 1036   Minutes 23      BM: 15  Therex: 324 Tyrone Davenport PTA, 02/15/23 at 11:01 AM         Definitions for assistance levels  Independent = pt does not require any physical supervision or assistance from another person for activity completion. Device may be needed.   Stand by assistance = pt requires verbal cues or instructions from another person, close to but not touching, to perform the activity  Minimal assistance= pt performs 75% or more of the activity; assistance is required to complete the activity  Moderate assistance= pt performs 50% of the activity; assistance is required to complete the activity  Maximal assistance = pt performs 25% of the activity; assistance is required to complete the activity  Dependent = pt requires total physical assistance to accomplish the task

## 2023-02-15 NOTE — DISCHARGE INSTR - DIET

## 2023-02-15 NOTE — FLOWSHEET NOTE
Anabella HAMMONDS was on the unit talking at the bedside with patients niece, states she may order metoprolol for sinus Tach.  Stef Hoang supervisor was able to start IV

## 2023-02-15 NOTE — CARE COORDINATION
LSW spoke with pt's niece regarding her DC back to [facility] today. Ambulance arranged for 1:00pm today. [facility] is prepared for pt return.       Requested updates were faxed to [facility] at [phone]

## 2023-02-16 NOTE — PROGRESS NOTES
Physical Therapy  Facility/Department: [facility] MED SURG F062/[id number]  Physical Therapy Discharge      NAME: Palmira De    : 1940 (31 y.o.)  MRN: [id number]    Account: [de-identified]  Gender: female      Patient has been discharged from acute care hospital. DC patient from current PT program.      Electronically signed by Danette Foss PT on 23 at 4:32 PM EST

## 2023-02-26 ENCOUNTER — HOSPITAL ENCOUNTER (INPATIENT)
Age: 83
LOS: 4 days | Discharge: NURSING, REHAB OR LONG-TERM CARE | DRG: 469 | End: 2023-03-02
Attending: SURGERY | Admitting: SURGERY
Payer: MEDICARE

## 2023-02-26 ENCOUNTER — APPOINTMENT (OUTPATIENT)
Dept: CT IMAGING | Age: 83
DRG: 469 | End: 2023-02-26
Payer: MEDICARE

## 2023-02-26 ENCOUNTER — APPOINTMENT (OUTPATIENT)
Dept: GENERAL RADIOLOGY | Age: 83
DRG: 469 | End: 2023-02-26
Payer: MEDICARE

## 2023-02-26 DIAGNOSIS — S73.015A POSTERIOR DISLOCATION OF LEFT HIP, INITIAL ENCOUNTER (HCC): Primary | ICD-10-CM

## 2023-02-26 DIAGNOSIS — S32.422A CLOSED DISPLACED FRACTURE OF POSTERIOR WALL OF LEFT ACETABULUM, INITIAL ENCOUNTER (HCC): ICD-10-CM

## 2023-02-26 DIAGNOSIS — G89.11 ACUTE PAIN DUE TO TRAUMA: ICD-10-CM

## 2023-02-26 DIAGNOSIS — G89.18 ACUTE POSTOPERATIVE PAIN: ICD-10-CM

## 2023-02-26 DIAGNOSIS — R29.6 FREQUENT FALLS: ICD-10-CM

## 2023-02-26 DIAGNOSIS — D62 ACUTE POSTOPERATIVE ANEMIA DUE TO EXPECTED BLOOD LOSS: ICD-10-CM

## 2023-02-26 PROBLEM — Z96.649 DISLOCATION OF HIP JOINT PROSTHESIS, INITIAL ENCOUNTER (HCC): Status: ACTIVE | Noted: 2023-02-26

## 2023-02-26 PROBLEM — T84.029A DISLOCATION OF HIP JOINT PROSTHESIS, INITIAL ENCOUNTER (HCC): Status: ACTIVE | Noted: 2023-02-26

## 2023-02-26 PROBLEM — F02.80 LATE ONSET ALZHEIMER'S DEMENTIA WITHOUT BEHAVIORAL DISTURBANCE (HCC): Chronic | Status: ACTIVE | Noted: 2022-02-21

## 2023-02-26 PROBLEM — G30.1 LATE ONSET ALZHEIMER'S DEMENTIA WITHOUT BEHAVIORAL DISTURBANCE (HCC): Chronic | Status: ACTIVE | Noted: 2022-02-21

## 2023-02-26 LAB
ABO/RH: NORMAL
ALBUMIN SERPL-MCNC: 3.3 G/DL (ref 3.5–4.6)
ALP BLD-CCNC: 104 U/L (ref 40–130)
ALT SERPL-CCNC: 27 U/L (ref 0–33)
ANION GAP SERPL CALCULATED.3IONS-SCNC: 13 MEQ/L (ref 9–15)
ANTIBODY SCREEN: NORMAL
APTT: 34.3 SEC (ref 24.4–36.8)
AST SERPL-CCNC: 26 U/L (ref 0–35)
BASOPHILS ABSOLUTE: 0 K/UL (ref 0–0.2)
BASOPHILS RELATIVE PERCENT: 0.5 %
BILIRUB SERPL-MCNC: 0.5 MG/DL (ref 0.2–0.7)
BUN BLDV-MCNC: 15 MG/DL (ref 8–23)
CALCIUM SERPL-MCNC: 9.6 MG/DL (ref 8.5–9.9)
CHLORIDE BLD-SCNC: 103 MEQ/L (ref 95–107)
CO2: 25 MEQ/L (ref 20–31)
CREAT SERPL-MCNC: 0.84 MG/DL (ref 0.5–0.9)
EOSINOPHILS ABSOLUTE: 0 K/UL (ref 0–0.7)
EOSINOPHILS RELATIVE PERCENT: 0.3 %
GFR SERPL CREATININE-BSD FRML MDRD: >60 ML/MIN/{1.73_M2}
GLOBULIN: 3.6 G/DL (ref 2.3–3.5)
GLUCOSE BLD-MCNC: 111 MG/DL (ref 70–99)
HCT VFR BLD CALC: 32.8 % (ref 37–47)
HEMOGLOBIN: 10.8 G/DL (ref 12–16)
INR BLD: 1.1
LYMPHOCYTES ABSOLUTE: 1 K/UL (ref 1–4.8)
LYMPHOCYTES RELATIVE PERCENT: 10.6 %
MCH RBC QN AUTO: 27.6 PG (ref 27–31.3)
MCHC RBC AUTO-ENTMCNC: 32.8 % (ref 33–37)
MCV RBC AUTO: 84.1 FL (ref 79.4–94.8)
MONOCYTES ABSOLUTE: 0.8 K/UL (ref 0.2–0.8)
MONOCYTES RELATIVE PERCENT: 9.4 %
NEUTROPHILS ABSOLUTE: 7.1 K/UL (ref 1.4–6.5)
NEUTROPHILS RELATIVE PERCENT: 79.2 %
PDW BLD-RTO: 16 % (ref 11.5–14.5)
PLATELET # BLD: 578 K/UL (ref 130–400)
POTASSIUM SERPL-SCNC: 4 MEQ/L (ref 3.4–4.9)
PROTHROMBIN TIME: 14.4 SEC (ref 12.3–14.9)
RBC # BLD: 3.9 M/UL (ref 4.2–5.4)
SODIUM BLD-SCNC: 141 MEQ/L (ref 135–144)
TOTAL PROTEIN: 6.9 G/DL (ref 6.3–8)
WBC # BLD: 9 K/UL (ref 4.8–10.8)

## 2023-02-26 PROCEDURE — 72192 CT PELVIS W/O DYE: CPT

## 2023-02-26 PROCEDURE — 80053 COMPREHEN METABOLIC PANEL: CPT

## 2023-02-26 PROCEDURE — 72170 X-RAY EXAM OF PELVIS: CPT

## 2023-02-26 PROCEDURE — 1210000000 HC MED SURG R&B

## 2023-02-26 PROCEDURE — 6370000000 HC RX 637 (ALT 250 FOR IP): Performed by: PHYSICIAN ASSISTANT

## 2023-02-26 PROCEDURE — 6360000002 HC RX W HCPCS: Performed by: PHYSICIAN ASSISTANT

## 2023-02-26 PROCEDURE — 72125 CT NECK SPINE W/O DYE: CPT

## 2023-02-26 PROCEDURE — 85610 PROTHROMBIN TIME: CPT

## 2023-02-26 PROCEDURE — 71045 X-RAY EXAM CHEST 1 VIEW: CPT

## 2023-02-26 PROCEDURE — 99223 1ST HOSP IP/OBS HIGH 75: CPT | Performed by: PHYSICIAN ASSISTANT

## 2023-02-26 PROCEDURE — 2580000003 HC RX 258: Performed by: PHYSICIAN ASSISTANT

## 2023-02-26 PROCEDURE — 86901 BLOOD TYPING SEROLOGIC RH(D): CPT

## 2023-02-26 PROCEDURE — 6830039000 HC L3 TRAUMA ALERT

## 2023-02-26 PROCEDURE — 86850 RBC ANTIBODY SCREEN: CPT

## 2023-02-26 PROCEDURE — 72131 CT LUMBAR SPINE W/O DYE: CPT

## 2023-02-26 PROCEDURE — 72128 CT CHEST SPINE W/O DYE: CPT

## 2023-02-26 PROCEDURE — 99285 EMERGENCY DEPT VISIT HI MDM: CPT

## 2023-02-26 PROCEDURE — 73552 X-RAY EXAM OF FEMUR 2/>: CPT

## 2023-02-26 PROCEDURE — 6360000002 HC RX W HCPCS

## 2023-02-26 PROCEDURE — 85730 THROMBOPLASTIN TIME PARTIAL: CPT

## 2023-02-26 PROCEDURE — 86900 BLOOD TYPING SEROLOGIC ABO: CPT

## 2023-02-26 PROCEDURE — 36415 COLL VENOUS BLD VENIPUNCTURE: CPT

## 2023-02-26 PROCEDURE — 85025 COMPLETE CBC W/AUTO DIFF WBC: CPT

## 2023-02-26 PROCEDURE — 2500000003 HC RX 250 WO HCPCS: Performed by: PHYSICIAN ASSISTANT

## 2023-02-26 PROCEDURE — 70450 CT HEAD/BRAIN W/O DYE: CPT

## 2023-02-26 PROCEDURE — 96374 THER/PROPH/DIAG INJ IV PUSH: CPT

## 2023-02-26 RX ORDER — BISACODYL 10 MG
10 SUPPOSITORY, RECTAL RECTAL DAILY PRN
Status: DISCONTINUED | OUTPATIENT
Start: 2023-02-26 | End: 2023-03-02 | Stop reason: HOSPADM

## 2023-02-26 RX ORDER — VITAMIN B COMPLEX
1000 TABLET ORAL DAILY
Status: DISCONTINUED | OUTPATIENT
Start: 2023-02-26 | End: 2023-03-02 | Stop reason: HOSPADM

## 2023-02-26 RX ORDER — FENTANYL CITRATE 50 UG/ML
INJECTION, SOLUTION INTRAMUSCULAR; INTRAVENOUS
Status: COMPLETED
Start: 2023-02-26 | End: 2023-02-26

## 2023-02-26 RX ORDER — SENNA AND DOCUSATE SODIUM 50; 8.6 MG/1; MG/1
1 TABLET, FILM COATED ORAL 2 TIMES DAILY
Status: DISCONTINUED | OUTPATIENT
Start: 2023-02-26 | End: 2023-03-02 | Stop reason: HOSPADM

## 2023-02-26 RX ORDER — ONDANSETRON 4 MG/1
4 TABLET, ORALLY DISINTEGRATING ORAL EVERY 8 HOURS PRN
Status: DISCONTINUED | OUTPATIENT
Start: 2023-02-26 | End: 2023-02-27

## 2023-02-26 RX ORDER — PROPOFOL 10 MG/ML
40 INJECTION, EMULSION INTRAVENOUS ONCE
Status: COMPLETED | OUTPATIENT
Start: 2023-02-26 | End: 2023-02-26

## 2023-02-26 RX ORDER — 0.9 % SODIUM CHLORIDE 0.9 %
1000 INTRAVENOUS SOLUTION INTRAVENOUS ONCE
Status: COMPLETED | OUTPATIENT
Start: 2023-02-26 | End: 2023-02-26

## 2023-02-26 RX ORDER — QUETIAPINE FUMARATE 25 MG/1
12.5 TABLET, FILM COATED ORAL NIGHTLY
Status: DISCONTINUED | OUTPATIENT
Start: 2023-02-26 | End: 2023-03-02 | Stop reason: HOSPADM

## 2023-02-26 RX ORDER — PROPOFOL 10 MG/ML
INJECTION, EMULSION INTRAVENOUS
Status: COMPLETED
Start: 2023-02-26 | End: 2023-02-26

## 2023-02-26 RX ORDER — SODIUM CHLORIDE 9 MG/ML
INJECTION, SOLUTION INTRAVENOUS PRN
Status: DISCONTINUED | OUTPATIENT
Start: 2023-02-26 | End: 2023-03-02 | Stop reason: HOSPADM

## 2023-02-26 RX ORDER — DONEPEZIL HYDROCHLORIDE 5 MG/1
5 TABLET, FILM COATED ORAL DAILY
Status: DISCONTINUED | OUTPATIENT
Start: 2023-02-26 | End: 2023-03-02 | Stop reason: HOSPADM

## 2023-02-26 RX ORDER — MIDAZOLAM HYDROCHLORIDE 2 MG/2ML
2 INJECTION, SOLUTION INTRAMUSCULAR; INTRAVENOUS ONCE
Status: COMPLETED | OUTPATIENT
Start: 2023-02-26 | End: 2023-02-26

## 2023-02-26 RX ORDER — FENTANYL CITRATE 0.05 MG/ML
50 INJECTION, SOLUTION INTRAMUSCULAR; INTRAVENOUS ONCE
Status: COMPLETED | OUTPATIENT
Start: 2023-02-26 | End: 2023-02-26

## 2023-02-26 RX ORDER — ONDANSETRON 2 MG/ML
4 INJECTION INTRAMUSCULAR; INTRAVENOUS EVERY 6 HOURS PRN
Status: DISCONTINUED | OUTPATIENT
Start: 2023-02-26 | End: 2023-02-27

## 2023-02-26 RX ORDER — MIDAZOLAM HYDROCHLORIDE 1 MG/ML
INJECTION INTRAMUSCULAR; INTRAVENOUS
Status: COMPLETED
Start: 2023-02-26 | End: 2023-02-26

## 2023-02-26 RX ORDER — KETAMINE HYDROCHLORIDE 10 MG/ML
2 INJECTION INTRAMUSCULAR; INTRAVENOUS ONCE
Status: DISCONTINUED | OUTPATIENT
Start: 2023-02-26 | End: 2023-02-26 | Stop reason: SDUPTHER

## 2023-02-26 RX ORDER — SODIUM CHLORIDE 0.9 % (FLUSH) 0.9 %
10 SYRINGE (ML) INJECTION PRN
Status: DISCONTINUED | OUTPATIENT
Start: 2023-02-26 | End: 2023-03-02 | Stop reason: HOSPADM

## 2023-02-26 RX ORDER — KETAMINE HYDROCHLORIDE 10 MG/ML
2 INJECTION INTRAMUSCULAR; INTRAVENOUS ONCE
Status: COMPLETED | OUTPATIENT
Start: 2023-02-26 | End: 2023-02-26

## 2023-02-26 RX ORDER — ENOXAPARIN SODIUM 100 MG/ML
30 INJECTION SUBCUTANEOUS 2 TIMES DAILY
Status: DISCONTINUED | OUTPATIENT
Start: 2023-02-26 | End: 2023-03-02 | Stop reason: HOSPADM

## 2023-02-26 RX ORDER — FENTANYL CITRATE 0.05 MG/ML
25 INJECTION, SOLUTION INTRAMUSCULAR; INTRAVENOUS ONCE
Status: COMPLETED | OUTPATIENT
Start: 2023-02-26 | End: 2023-02-26

## 2023-02-26 RX ORDER — SODIUM CHLORIDE, SODIUM LACTATE, POTASSIUM CHLORIDE, CALCIUM CHLORIDE 600; 310; 30; 20 MG/100ML; MG/100ML; MG/100ML; MG/100ML
INJECTION, SOLUTION INTRAVENOUS CONTINUOUS
Status: DISCONTINUED | OUTPATIENT
Start: 2023-02-26 | End: 2023-03-01

## 2023-02-26 RX ORDER — SODIUM CHLORIDE 0.9 % (FLUSH) 0.9 %
10 SYRINGE (ML) INJECTION EVERY 12 HOURS SCHEDULED
Status: DISCONTINUED | OUTPATIENT
Start: 2023-02-26 | End: 2023-03-02 | Stop reason: HOSPADM

## 2023-02-26 RX ADMIN — SODIUM CHLORIDE 1000 ML: 9 INJECTION, SOLUTION INTRAVENOUS at 08:55

## 2023-02-26 RX ADMIN — SENNOSIDES AND DOCUSATE SODIUM 1 TABLET: 50; 8.6 TABLET ORAL at 21:27

## 2023-02-26 RX ADMIN — FENTANYL CITRATE 50 MCG: 50 INJECTION, SOLUTION INTRAMUSCULAR; INTRAVENOUS at 08:36

## 2023-02-26 RX ADMIN — DONEPEZIL HYDROCHLORIDE 5 MG: 5 TABLET, FILM COATED ORAL at 15:02

## 2023-02-26 RX ADMIN — QUETIAPINE FUMARATE 12.5 MG: 25 TABLET ORAL at 21:27

## 2023-02-26 RX ADMIN — KETAMINE HYDROCHLORIDE 118 MG: 10 INJECTION, SOLUTION INTRAMUSCULAR; INTRAVENOUS at 08:27

## 2023-02-26 RX ADMIN — SODIUM CHLORIDE 1000 ML: 9 INJECTION, SOLUTION INTRAVENOUS at 07:38

## 2023-02-26 RX ADMIN — FENTANYL CITRATE 50 MCG: 0.05 INJECTION, SOLUTION INTRAMUSCULAR; INTRAVENOUS at 08:36

## 2023-02-26 RX ADMIN — PROPOFOL 40 MG: 10 INJECTION, EMULSION INTRAVENOUS at 08:36

## 2023-02-26 RX ADMIN — Medication 10 ML: at 21:33

## 2023-02-26 RX ADMIN — SENNOSIDES AND DOCUSATE SODIUM 1 TABLET: 50; 8.6 TABLET ORAL at 15:02

## 2023-02-26 RX ADMIN — MIDAZOLAM 2 MG: 1 INJECTION INTRAMUSCULAR; INTRAVENOUS at 08:35

## 2023-02-26 RX ADMIN — SODIUM CHLORIDE, POTASSIUM CHLORIDE, SODIUM LACTATE AND CALCIUM CHLORIDE: 600; 310; 30; 20 INJECTION, SOLUTION INTRAVENOUS at 14:58

## 2023-02-26 RX ADMIN — PROPOFOL 40 MG: 10 INJECTION, EMULSION INTRAVENOUS at 08:49

## 2023-02-26 RX ADMIN — FENTANYL CITRATE 25 MCG: 0.05 INJECTION, SOLUTION INTRAMUSCULAR; INTRAVENOUS at 07:39

## 2023-02-26 RX ADMIN — Medication 1000 UNITS: at 15:02

## 2023-02-26 RX ADMIN — MIDAZOLAM HYDROCHLORIDE 2 MG: 2 INJECTION, SOLUTION INTRAMUSCULAR; INTRAVENOUS at 08:35

## 2023-02-26 ASSESSMENT — ENCOUNTER SYMPTOMS
ABDOMINAL PAIN: 0
COUGH: 0
BACK PAIN: 0
NAUSEA: 0
RHINORRHEA: 0
EYE PAIN: 0
PHOTOPHOBIA: 0
DIARRHEA: 0
VOMITING: 0
SHORTNESS OF BREATH: 0
SORE THROAT: 0

## 2023-02-26 ASSESSMENT — PAIN DESCRIPTION - LOCATION: LOCATION: HIP

## 2023-02-26 ASSESSMENT — PAIN DESCRIPTION - ORIENTATION: ORIENTATION: LEFT

## 2023-02-26 ASSESSMENT — PAIN SCALES - GENERAL: PAINLEVEL_OUTOF10: 10

## 2023-02-26 ASSESSMENT — PAIN DESCRIPTION - FREQUENCY: FREQUENCY: CONTINUOUS

## 2023-02-26 NOTE — ED PROVIDER NOTES
[facility] ED  eMERGENCY dEPARTMENTeNCOUnter      Pt Name: Laura Soto  MRN: [id number]  [birth year][place] 1940  Date ofevaluation: 2/26/2023  Provider: Gamaliel Ordoñez PA-C    CHIEF COMPLAINT       Chief Complaint   Patient presents with    Fall         HISTORY OF PRESENT ILLNESS   (Location/Symptom, Timing/Onset,Context/Setting, Quality, Duration, Modifying Factors, Severity)  Note limiting factors. Laura Soto is a 80 y.o. female who presents to the emergency department witnessed fall. Patient resides in the memory care unit at [facility] and was found on morning rounds out of her bed next to it on the floor. She does have a history of frequent falls this was unwitnessed. She was recently admitted to the hospital for a left hip replacement done by Dr. Elke Armstrong she is 14 days postop and she was admitted to the trauma service. She is not on any anticoagulation. Patient has obvious deformity to her left hip. HPI    NursingNotes were reviewed. REVIEW OF SYSTEMS    (2-9 systems for level 4, 10 or more for level 5)     Review of Systems   Constitutional:  Negative for chills, diaphoresis, fatigue and fever. HENT:  Negative for congestion, rhinorrhea and sore throat. Eyes:  Negative for photophobia and pain. Respiratory:  Negative for cough and shortness of breath. Cardiovascular:  Negative for chest pain and palpitations. Gastrointestinal:  Negative for abdominal pain, diarrhea, nausea and vomiting. Genitourinary:  Negative for dysuria and flank pain. Musculoskeletal:  Positive for arthralgias, gait problem and joint swelling. Negative for back pain. Skin:  Negative for rash. Neurological:  Negative for dizziness, light-headedness and headaches. Psychiatric/Behavioral:  Positive for confusion (baseline dementia). All other systems reviewed and are negative. Except as noted above the remainder of the review of systems was reviewed and negative.        PAST MEDICAL HISTORY     Past Medical History:   Diagnosis Date    Asymptomatic varicose veins of both lower extremities     Dementia with behavioral disturbance     Elevated blood pressure reading     Full dentures     History of hip fracture 2012    Small vessel disease, cerebrovascular          SURGICALHISTORY       Past Surgical History:   Procedure Laterality Date    HIP FRACTURE SURGERY  2012    HIP SURGERY Left 2/12/2023    LEFT HIP HEMIARTHROPLASTY, PROPHYLACTIC STABILIZATION OF LEFT FEMUR performed by Natalio Gómez MD at [address] [place] [address]       Previous Medications    ACETAMINOPHEN (TYLENOL) 325 MG TABLET    Take 2 tablets by mouth every 4 hours as needed for Pain    DONEPEZIL (ARICEPT) 5 MG TABLET    Take 5 mg by mouth daily     ENOXAPARIN SODIUM (LOVENOX) 30 MG/0.3ML INJECTION    Inject 0.3 mLs into the skin 2 times daily for 28 days    KETOCONAZOLE (NIZORAL) 2 % CREAM    Apply topically 2 times daily Apply topically daily L foot    LACTOBACILLUS (ACIDOPHILUS) CAPS CAPSULE    Take 1 capsule by mouth 2 times daily    QUETIAPINE (SEROQUEL) 25 MG TABLET    Take 0.5 tablets by mouth daily    SENNOSIDES-DOCUSATE SODIUM (SENOKOT-S) 8.6-50 MG TABLET    Take 2 tablets by mouth in the morning and at bedtime for 14 days    VITAMIN D3 (CHOLECALCIFEROL) 25 MCG (1000 UT) TABS TABLET    Take 1,000 Units by mouth daily       ALLERGIES     Bactrim [sulfamethoxazole-trimethoprim], Bee venom, Ceftin [cefuroxime], and Wasp venom    FAMILY HISTORY       Family History   Family history unknown: Yes          SOCIAL HISTORY       Social History     Socioeconomic History    Marital status:       Spouse name: None    Number of children: None    Years of education: None    Highest education level: None   Tobacco Use    Smoking status: Unknown    Smokeless tobacco: Never   Vaping Use    Vaping Use: Never used   Social History Narrative    No children    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    Resides at [facility] living [facility]     Social Determinants of Health     Physical Activity: Insufficiently Active    Days of Exercise per Week: 2 days    Minutes of Exercise per Session: 30 min       SCREENINGS    Darcy Coma Scale  Eye Opening: Spontaneous  Best Verbal Response: Confused  Best Motor Response: Localizes pain  Darcy Coma Scale Score: 13 @FLOW([id number])@      PHYSICAL EXAM    (up to 7 for level 4, 8 or more for level 5)     ED Triage Vitals   BP Temp Temp Source Heart Rate Resp SpO2 Height Weight   02/26/23 0653 02/26/23 0655 02/26/23 0655 02/26/23 0653 02/26/23 0653 02/26/23 0653 -- 02/26/23 0731   (!) 174/75 98 °F (36.7 °C) Oral (!) 118 18 98 %  130 lb (59 kg)       Physical Exam  Vitals and nursing note reviewed. Constitutional:       General: She is not in acute distress. Appearance: Normal appearance. She is well-developed. She is not diaphoretic. HENT:      Head: Normocephalic and atraumatic. Nose: Nose normal.      Mouth/Throat:      Mouth: Mucous membranes are moist.   Eyes:      General: Lids are normal.      Conjunctiva/sclera: Conjunctivae normal.   Cardiovascular:      Rate and Rhythm: Normal rate and regular rhythm. Pulses: Normal pulses. Heart sounds: Normal heart sounds. Pulmonary:      Effort: Pulmonary effort is normal.      Breath sounds: Normal breath sounds. Abdominal:      General: Bowel sounds are normal.      Palpations: Abdomen is soft. Tenderness: There is no abdominal tenderness. Musculoskeletal:         General: Normal range of motion. Cervical back: Normal range of motion and neck supple. Left hip: Deformity present. Comments: Internally rotated him  2+dp and pt pulse. Lymphadenopathy:      Cervical: No cervical adenopathy. Skin:     General: Skin is warm and dry. Capillary Refill: Capillary refill takes less than 2 seconds. Findings: No rash. Neurological:      Mental Status: She is alert and oriented to person, place, and time. Psychiatric:         Thought Content: Thought content normal.         Judgment: Judgment normal.       DIAGNOSTIC RESULTS     EKG: All EKG's are interpreted by the Emergency Department Physician who either signs or Co-signsthis chart in the absence of a cardiologist.        RADIOLOGY:   [place] such as CT, Ultrasound and MRI are read by the radiologist. Plain radiographic images are visualized and preliminarily interpreted by the emergency physician with the below findings:        Interpretation per the Radiologist below, if available at the time ofthis note:    XR FEMUR LEFT (MIN 2 VIEWS)   Final Result   1. Dislocation of the left hip prosthesis   2. Displacement of the acetabular component from the native acetabulum. XR CHEST PORTABLE   Final Result   No acute process. CT HEAD WO CONTRAST   Final Result   1. There is no acute intracranial abnormality. Specifically, there is no   intracranial hemorrhage. 2. Atrophy and periventricular leukomalacia,   . CT CERVICAL SPINE WO CONTRAST   Final Result   1. There is no acute compression fracture or subluxation of the cervical   spine. 2. Multilevel degenerative disc and degenerative joint disease. CT THORACIC SPINE WO CONTRAST   Final Result   1. There is no acute compression fracture or osseous abnormality of the   thoracic spine   2. Kyphosis of the thoracic spine   3. Mild multilevel degenerative disc disease. CT LUMBAR SPINE WO CONTRAST   Final Result   1. No evidence of fracture or subluxation involving the lumbar spine. 2.  Old left 11th and 12th rib fractures. 3.  Diffuse osteopenia again seen, when compared to the previous study   performed 01/21/2022.      4.  Osteo-degenerative changes again identified. 5.  Small right posterolateral disc protrusions at L3-4 and L4-5.   The L4-5   disc protrusion is superimposed upon a previously noted disc bulge. 6.  Mild spinal canal stenosis at L3-4 and L4-5.      7.  Other findings, as described above. XR PELVIS (1-2 VIEWS)   Final Result   2 postreduction images provided with the 1st image again revealing a left hip   dislocation of the prosthesis, however, there is dislocation of the   prosthetic femoral head out of the prostatic acetabulum. The 2nd post   reduction image reveals relocation of the prosthetic femoral head within the   native acetabulum, but still dislocation of the prosthetic acetabulum. RECOMMENDATION:         XR PELVIS (1-2 VIEWS)   Final Result   Posterolateral dislocation of the left hip arthroplasty. At least   questionable mild irregularity of the left posterolateral acetabular cup   could represent small irregularity however no displaced fracture fragment. ED BEDSIDE ULTRASOUND:   Performed by ED Physician - none    LABS:  Labs Reviewed   CBC WITH AUTO DIFFERENTIAL - Abnormal; Notable for the following components:       Result Value    RBC 3.90 (*)     Hemoglobin 10.8 (*)     Hematocrit 32.8 (*)     MCHC 32.8 (*)     RDW 16.0 (*)     Platelets 834 (*)     Neutrophils Absolute 7.1 (*)     All other components within normal limits   COMPREHENSIVE METABOLIC PANEL - Abnormal; Notable for the following components:    Glucose 111 (*)     Albumin 3.3 (*)     Globulin 3.6 (*)     All other components within normal limits   PROTIME-INR   APTT   TYPE AND SCREEN       All other labs were within normal range or not returned as of this dictation. EMERGENCY DEPARTMENT COURSE and DIFFERENTIAL DIAGNOSIS/MDM:   Vitals:    Vitals:    02/26/23 0950 02/26/23 0955 02/26/23 1030 02/26/23 1130   BP: 135/73 125/60 (!) 137/59 (!) 121/101   Pulse:  96 92 (!) 106   Resp:   16 16   Temp:       TempSrc:       SpO2:  99% 97% 95%   Weight:               MDM    Patient presents to the emergency department after fall with obvious deformity. She is afebrile hemodynamically stable and is at her baseline dementia. She is neurovascularly intact on initial assessment. X-ray reveals posterior dislocation. Due to unwitnessed fall ct head, c, t, and l spine are ordered, no acute process on ct. left hip reduction x2 with relocation of femoral head but acetabular prosthesis is still dislocated. Orthopedics was consulted Dr. Erika Dela Cruz and patient can be admitted here under trauma service and patient to be n.p.o. after midnight and they will repair tomorrow Dr. Shashank Malone. Patient has remained stable throughout her ER visit with family at bedside. Patient is stable ready for admission. REASSESSMENT          CRITICAL CARE TIME   Total Critical Care time was  minutes, excluding separatelyreportable procedures. There was a high probability ofclinically significant/life threatening deterioration in the patient's condition which required my urgent intervention. CONSULTS:  IP CONSULT TO ORTHOPEDIC SURGERY    PROCEDURES:  Unless otherwise noted below, none     Ortho Injury    Date/Time: 2/26/2023 12:38 PM  Performed by: Damaris Izquierdo PA-C  Authorized by: Nivia Wolfe MD   Consent: Written consent obtained.   Risks and benefits: risks, benefits and alternatives were discussed  Consent given by: guardian and power of   Patient understanding: patient states understanding of the procedure being performed  Patient consent: the patient's understanding of the procedure matches consent given  Procedure consent: procedure consent matches procedure scheduled  Relevant documents: relevant documents present and verified  Test results: test results available and properly labeled  Site marked: the operative site was marked  Imaging studies: imaging studies available  Patient identity confirmed: arm band, hospital-assigned identification number and provided demographic data  Time out: Immediately prior to procedure a \"time out\" was called to verify the correct patient, procedure, equipment, support staff and site/side marked as required. Injury location: hip  Location details: left hip  Injury type: dislocation  Dislocation type: posterior  Spontaneous dislocation: no  Prosthesis: yes  Pre-procedure neurovascular assessment: neurovascularly intact  Pre-procedure distal perfusion: normal  Pre-procedure neurological function: normal  Pre-procedure range of motion: normal    Anesthesia:  Local anesthesia used: no    Sedation:  Patient sedated: yes  Sedatives: ketamine, propofol, fentanyl and midazolam  Analgesia: fentanyl    Manipulation performed: yes  Reduction method: traction and counter traction  Reduction successful: yes  X-ray confirmed reduction: yes  Post-procedure neurovascular assessment: post-procedure neurovascularly intact  Patient tolerance: patient tolerated the procedure well with no immediate complications      Procedural sedation    Date/Time: 2023 12:44 PM  Performed by: Kavitha Mcgee PA-C  Authorized by: Amador Bryant MD     Consent:     Consent obtained:  Written    Consent given by:  Guardian and healthcare agent    Risks, benefits, and alternatives were discussed: yes      Risks discussed:   Allergic reaction, dysrhythmia, inadequate sedation, nausea, vomiting, respiratory compromise necessitating ventilatory assistance and intubation, prolonged sedation necessitating reversal and prolonged hypoxia resulting in organ damage  Universal protocol:     Patient identity confirmed:  Verbally with patient, arm band and hospital-assigned identification number  Indications:     Procedure performed:  Dislocation reduction    Procedure necessitating sedation performed by:  Physician performing sedation    Intended level of sedation:  Moderate  Pre-sedation assessment:     NPO status caution: unable to specify NPO status      ASA classification: class 2 - patient with mild systemic disease      Mouth openin finger widths    Thyromental distance: 2 finger widths    Mallampati score:  II - soft palate, uvula, fauces visible    Neck mobility: normal      Pre-sedation assessments completed and reviewed: airway patency, hydration status, nausea/vomiting and pain level      History of difficult intubation: no    Immediate pre-procedure details:     Reassessment: Patient reassessed immediately prior to procedure      Reviewed: vital signs      Verified: bag valve mask available, emergency equipment available, intubation equipment available, IV patency confirmed, oxygen available, reversal medications available and suction available    Procedure details (see MAR for exact dosages):     Preoxygenation:  Bag valve mask    Sedation:  Midazolam    Analgesia:  Fentanyl    Intra-procedure monitoring:  Blood pressure monitoring, cardiac monitor, continuous pulse oximetry, frequent LOC assessments and frequent vital sign checks    Intra-procedure events: none    Post-procedure details:     Estimated blood loss (see I/O flowsheets): no      Post-sedation assessments completed and reviewed: airway patency, mental status and respiratory function      Specimens recovered:  None    Patient is stable for discharge or admission: yes      Procedure completion:  Tolerated    FINAL IMPRESSION      1. Posterior dislocation of left hip, initial encounter ([facility] [address]    2. Frequent falls          DISPOSITION/PLAN   DISPOSITION Admitted 02/26/2023 11:39:23 AM      PATIENT REFERREDTO:  No follow-up provider specified.     DISCHARGEMEDICATIONS:  New Prescriptions    No medications on file          (Please note that portions of this note were completed with a voice recognition program.  Efforts were made to edit the dictations but occasionally words are mis-transcribed.)    Rashel Newsome PA-C (electronically signed)  Attending Emergency Physician         Rashel Newsome PA-C  02/26/23 0623

## 2023-02-26 NOTE — ED TRIAGE NOTES
PT COMES TO THE ED TODAY AFTER A FALL SHE HAD AT THE NURSING HOME   PER NH THEY WERE DOING MORNING ROUNDS AND PT FELL OUT OF BED ON HER LEFT SIDE  LEG HAS OBVIOUS DEFORMITY TO LEFT LEG        PT HAD GOT RECENT SURGERY ON HER LEFT HIP ABOUT TWO WEEKS AGO     PRE SURGERY PT AMBULATED WITH A WALKER   PT HAS A HISTORY OF DEMENTIA     PT A&OX2    PT BREATHING EQUAL AND UNLABORED   PULSES PRESENT IN LOWER EXTREMITIES

## 2023-02-26 NOTE — PROGRESS NOTES
Pt arrived to unit via gurney. Alert to self, pleasantly confused. Dependent transfer onto bed with 2 person max assist .  Etienne at bedside assisting pt with dinner at this time. VSS, bed alarm on.

## 2023-02-26 NOTE — CONSULTS
Spiritual Care Services     Summary of Visit:  This  was called to ED to provide care for this woman. She is accompanied by her niece. The patient had suffered a fall in the NH. She appeared calm, however, when the bed was slightly moved, she expressed pain. Patient is not very expressive otherwise. Patient is from a memory care unit in the NH. The patient is [employer]ism and would appreciate a visit from a  for the Sacrament of Healing and communion. The patient is to have surgery Monday to correct hip issue. This  provided spiritual support and prayer. Encounter Summary  Encounter Overview/Reason : Initial Encounter, Spiritual/Emotional Needs  Service Provided For[de-identified] Patient and family together  Referral/Consult From[de-identified] Family  Support System: Family members  Complexity of Encounter: Moderate  Encounter   Type: Initial Screen/Assessment     Spiritual/Emotional needs  Type: Spiritual Support  Rituals, Rites and Sacraments  Type:  (Patient would appreciate anointing and communion)              Advance Care Planning  Type: Completed AD/ACP document(s)    Spiritual Assessment/Intervention/Outcomes:    Assessment: Calm, Compromised coping, Impaired resilience, Powerlessness    Intervention: Active listening, Discussed belief system/Yarsanism practices/mary beth, Discussed illness injury and its impact, Explored/Affirmed feelings, thoughts, concerns, Prayer (assurance of)/[place]    Outcome: Comfort, Encouraged, Engaged in conversation, Expressed feelings, needs, and concerns, Expressed Gratitude      Care Plan:    Plan and Referrals  Plan/Referrals: Continue to visit, (comment), Placed on [facility] List, Coordinate Rites and/or Rituals    Continue to support.   [address]   Electronically signed by Joan Carvajal, [address] on 2/26/2023 at 3:23 PM.    To reach a  for emotional and spiritual support, place an [facility] [place] [facility]\"" consult request.   If a  is needed immediately, dial 0 and ask to page the on-call .

## 2023-02-26 NOTE — ED NOTES
Report called to [facility]. Patient brief changed, placed in hospital gown, and tele pack placed as room is now clean and ready.      Dickson Welsh RN  02/26/23 1445

## 2023-02-26 NOTE — H&P
Trauma Consult / H & P Note    Reason for Consult: Trauma  Consulting Provider: No att. providers found      BASIC INJURY INFORMATION:  Level of activation: CAT 2  Mode of transport: EMS  Mechanism of injury: Fall from bed, unwitnessed  Complicating features: NA  Protective measures: NA    HISTORY OF PRESENT INJURY:   Ho Gordon is a 80 y.o. female with a PMHx of dementia and recent admission to our service from 2/11/23-2/15/23 s/p left femur fracture repair presents to [facility] ED today for evaluation after sustaining an unwitnessed fall from her bed at [facility]. Patient was discharged from our trauma service to SNF on 2/15/23 after recent admission to our service. Patient POD# 14 s/p left hemiarthroplasty with Orthopaedic Surgery (Dr. Justine De La Cruz) on 2/12/23. Per reports, patient was found on the floor of her room at the nursing home during morning rounds. ED found left posterior hip dislocation. Multiple attempts made by ED to reduce hip but unsuccessful and remains with dislocation of prosthetic acetabulum. Family at beside reports that patient was noted to have improved sprits the last several days since returning to her SNF. More engaged with community activities. Ambulating with walker.       PRIMARY SURVEY:  Airway: Intact  Breathing: Normal   Breath Sounds: Breath Sounds Equal Bilaterally  Circulation:    Pulses: Normal   Skin: Normal skin color, texture and turgor and No rashes or lesions  Disability:   Pupils: PERRL   GCS:    Best Eyes: 3    Best Verbal: 4    Best Motor: 6    Total: 13    Vitals:   Vitals:    02/26/23 0848 02/26/23 0851 02/26/23 0852 02/26/23 0852   BP: 125/77 (!) 141/62     Pulse: 95 97 93    Resp: 18 20     Temp:       TempSrc:       SpO2: 94% 92% (!) 89% 93%   Weight:           SECONDARY SURVEY:  Neurologic: Alert and Oriented x 1 (baseline A&Ox 1-2)  HEENT:   Head: No lacerations, bony step-offs, or abrasions and Midface stable to palpation   Eyes: PERRL, Corneas/Conjunctiva without lesions and EOM intact   Ears: Not Examined   Nose: Septum Midline, No crepitus with motion; and No bloody discharge; Throat: Oral cavity without trauma   Neck: No midline tenderness and No lacerations/wounds  Pulmonary: External exam: no crepitus or pain with palpation, no contusions or abrasions; and Lung exam: breath sounds clear, no wheezes, no rales  Cardiovascular:    Pulses: Bilateral radial, femoral, DP and PT pulses are normal;  Abdomen: Appearance: Non-distended, No scars, lacerations, contusions; and Palpation: no tenderness   Rectal: Not performed  Pelvis/Perineum: Pelvis is stable to palpation;  Musculoskeletal:    Back/Spine: Thoracolumbar spinal column non-tender; no step off or deformity; Extremities: LLE shortened and internally rotated, N/V intact distally. Reminder of extremities grossly atraumatic. PAST MEDICAL HISTORY:  Past Medical History:   Diagnosis Date    Asymptomatic varicose veins of both lower extremities     Dementia with behavioral disturbance     Elevated blood pressure reading     Full dentures     History of hip fracture 2012    Small vessel disease, cerebrovascular        PAST SURGICAL HISTORY:  Past Surgical History:   Procedure Laterality Date    HIP FRACTURE SURGERY  2012    HIP SURGERY Left 2/12/2023    LEFT HIP HEMIARTHROPLASTY, PROPHYLACTIC STABILIZATION OF LEFT FEMUR performed by Angelic Fair MD at [address] [place] [address] Street:   Prior to Admission medications    Medication Sig Start Date End Date Taking?  Authorizing Provider   enoxaparin Sodium (LOVENOX) 30 MG/0.3ML injection Inject 0.3 mLs into the skin 2 times daily for 28 days 2/15/23 3/15/23  Catherine Jones PA-C   sennosides-docusate sodium (SENOKOT-S) 8.6-50 MG tablet Take 2 tablets by mouth in the morning and at bedtime for 14 days 2/15/23 3/1/23  Catherine Jones PA-C   acetaminophen (TYLENOL) 325 MG tablet Take 2 tablets by mouth every 4 hours as needed for Pain 1/24/22 2/11/23  Mohan Abdul PA-C   Lactobacillus (ACIDOPHILUS) CAPS capsule Take 1 capsule by mouth 2 times daily    Historical Provider, MD   vitamin D3 (CHOLECALCIFEROL) 25 MCG (1000 UT) TABS tablet Take 1,000 Units by mouth daily    Historical Provider, MD   ketoconazole (NIZORAL) 2 % cream Apply topically 2 times daily Apply topically daily L foot    Historical Provider, MD   donepezil (ARICEPT) 5 MG tablet Take 5 mg by mouth daily  2/12/21   Historical Provider, MD   QUEtiapine (SEROQUEL) 25 MG tablet Take 0.5 tablets by mouth daily 2/19/21   Heidy Delgado MD       ALLERGIES:  Bactrim [sulfamethoxazole-trimethoprim], Bee venom, Ceftin [cefuroxime], and Wasp venom    SOCIAL HISTORY:   Social History     Socioeconomic History    Marital status:       Spouse name: None    Number of children: None    Years of education: None    Highest education level: None   Tobacco Use    Smoking status: Unknown    Smokeless tobacco: Never   Vaping Use    Vaping Use: Never used   Social History Narrative    No children    States she has worked in [employer] in [address] [place] [address] dementia with behavior problems in 2020    [facility] at [facility]     Social Determinants of Health     Physical Activity: Insufficiently Active    Days of Exercise per Week: 2 days    Minutes of Exercise per Session: 30 min       FAMILY HISTORY:  Family History   Family history unknown: Yes       REVIEW OF SYSTEMS: Unable to obtain secondary to mental status       BASIC LABS:   CBC with Differential:    Lab Results   Component Value Date/Time    WBC 9.0 02/26/2023 07:15 AM    RBC 3.90 02/26/2023 07:15 AM    HGB 10.8 02/26/2023 07:15 AM    HCT 32.8 02/26/2023 07:15 AM     02/26/2023 07:15 AM    MCV 84.1 02/26/2023 07:15 AM    MCH 27.6 02/26/2023 07:15 AM    MCHC 32.8 02/26/2023 07:15 AM    RDW 16.0 02/26/2023 07:15 AM    LYMPHOPCT 10.6 02/26/2023 07:15 AM    MONOPCT 9.4 02/26/2023 07:15 AM EOSPCT 0.0 01/13/2021 12:00 AM    BASOPCT 0.5 02/26/2023 07:15 AM    MONOSABS 0.8 02/26/2023 07:15 AM    LYMPHSABS 1.0 02/26/2023 07:15 AM    EOSABS 0.0 02/26/2023 07:15 AM    BASOSABS 0.0 02/26/2023 07:15 AM     CMP:   Lab Results   Component Value Date     02/26/2023    K 4.0 02/26/2023     02/26/2023    CO2 25 02/26/2023    BUN 15 02/26/2023    CREATININE 0.84 02/26/2023    GLUCOSE 111 (H) 02/26/2023    CALCIUM 9.6 02/26/2023    PROT 6.9 02/26/2023    LABALBU 3.3 (L) 02/26/2023    BILITOT 0.5 02/26/2023    ALKPHOS 104 02/26/2023    AST 26 02/26/2023    ALT 27 02/26/2023    LABGLOM >60.0 02/26/2023    GFRAA >60.0 01/22/2022    GLOB 3.6 (H) 02/26/2023     Magnesium: No results found for: MG  Troponin:   Lab Results   Component Value Date/Time    TROPONINI <0.10 01/13/2021 12:00 AM     PT/INR:   Recent Labs     02/26/23  0715   PROTIME 14.4   INR 1.1     APTT:   Recent Labs     02/26/23  0715   APTT 34.3     EtOH:   Lab Results   Component Value Date/Time    ETOH <10 02/11/2023 08:25 PM       RADIOLOGY: (Personally reviewed and per Radiology Report)  CXR: No acute process. PELVIS XR:   Posterolateral dislocation of the left hip arthroplasty. At least questionable mild irregularity of the left posterolateral acetabular cup could represent small irregularity however no displaced fracture fragment. PELVIS XR: Post Reduction  2 postreduction images provided with the 1st image again revealing a left hip dislocation of the prosthesis, however, there is dislocation of the prosthetic femoral head out of the prostatic acetabulum. The 2nd post reduction image reveals relocation of the prosthetic femoral head within the native acetabulum, but still dislocation of the prosthetic acetabulum. Left Femur XR:  Dislocation of the left hip prosthesis   Displacement of the acetabular component from the native acetabulum    CT Head:  There is no acute intracranial abnormality.   Specifically, there is no intracranial hemorrhage. Atrophy and periventricular leukomalacia. CT C-Spine: There is no acute compression fracture or subluxation of the cervical spine. Multilevel degenerative disc and degenerative joint disease. CT T-Spine: There is no acute compression fracture or osseous abnormality of the thoracic spine. Kyphosis of the thoracic spine. Mild multilevel degenerative disc disease. CT L-Spine:  No evidence of fracture or subluxation involving the lumbar spine. Old left 11th and 12th rib fractures. Diffuse osteopenia again seen, when compared to the previous study performed 01/21/2022. Osteo-degenerative changes again identified. Small right posterolateral disc protrusions at L3-4 and L4-5. The L4-5 disc protrusion is superimposed upon a previously noted disc bulge. Mild spinal canal stenosis at L3-4 and L4-5. ASSESSMENT:  Rosa Bishop is a 80 y.o. female with a PMHx of dementia and recent admission to our service from 2/11/23-2/15/23 s/p left hemiarthroplasty with Orthopaedic Surgery (Dr. Hosea Mcwilliams) on 2/12/15 presents to [facility] ED today s/p unwitnessed fall from bed at [facility]. Exam with GCS 13, LLE with internal rotation and TTP to left lateral hip, N/V intact distally. CT imaging without head or spine injuries. VSS on room air. Labs reviewed and unremarkable. ED with partial reduction of prosthetic joint. Trauma workup found left posterior hip dislocation of prosthetic acetabulum s/p reduction of posterior dislocation left hip arthroplasty be ED prior to trauma consult evaluation. PLAN: Will admit to our Trauma service with Orthopaedic Surgery consulted. Plan for OR with Dr. Hosea Mcwilliams on Monday. NPO at midnight. Neurological: Acute pain due to trauma, acute post-op pain.  Hx dementia  - Continue Tylenol 650mg q6hr scheduled  - Continue Oxycodone 2.5/5mg q4hr prn mod/severe pain  - Continue home donepezil 5mg daily  - Continue home seroquel 12.5mg daily     Cardiovascular: Patient did have several episodes of self limiting SVT last admission, family at the time opted to not treat given DNR-CC status. - Vital signs per unit protocol  - Pre-op EKG ordered  - Telemetry     Respiratory: Sating appropriately on RA  - Maintain O2 sats > 92%  - Encourage IS 10x hourly     GI/Diet: No acute issues  - Regular diet  - NPO at midnight for OR with Ortho tomorrow. - Bowel regimen: senokot BID, miralax daily     Renal/Electrolytes: No acute electrolyte abnormalities, BUN/Cr within normal limits on last BMP  - Continue mIVF with LR @100cc/hr  - AM BMP     ID: No leukocytosis, afebrile  - Liliana-op abx only     Heme: Down trend in H&H  - No indication for transfusion  - AM CBC     Endocrine: No acute or chronic concerns      MSK: Left posterior hip dislocation of prosthetic acetabulum s/p reduction of posterior dislocation left hip arthroplasty be ED prior to trauma consult evaluation. s/p unwitnessed fall from bed at [facility] 2/26. s/p left hemiarthroplasty with Orthopaedic Surgery (Dr. Ervin Sepulveda) on 2/12/23.  - Spines: Clear  - Weight Bearing Restrictions: NWB LLE  - Activity: Ambulatory with assistance  - PT/OT: post-op eval as able     Prophylaxis:   - SCDs and Lovenox 30mg BID for VTE PPx     Lines/Tubes/Drains:  - Maintain PIVs  - No indication for harris catheter, monitor for urinary retention    Dispo: Will admit to our Trauma service with Orthopaedic Surgery consulted. Plan for OR with Dr. Ervin Sepulveda on Monday. NPO at midnight.    - Follow up with Orthopedics (Dr. Ervin Sepulveda) TBD.  - Follow up with PCP for chronic medical conditions  - No trauma follow up indicated. Fahad Tubbs [place]  Trauma/Critical Care/General Surgery         This patient's plan of care was discussed and made in collaboration with Trauma Attending physician, Cindy Cobos MD.    Teaching Physician Note:  I saw and evaluated the patient.   I personally obtained the key and critical portions of the history and physical exam.  I reviewed the LOVE's documentation and discussed the patient with the LOVE. I agree with the LOVE's medical decision making as documented in the LOVE note. Due to recent history of SVT post op, will obtain cardiology consult prior to clearing for OR.       Juno Castaneda MD

## 2023-02-26 NOTE — LETTER
INCIDENTAL FINDINGS REPORT  02/28/23    [address] [facility] record number: [id number]        Dear Chava Blankenship:    While reviewing the diagnostic tests performed by the [address] [facility], we discovered an abnormality that is not associated with the your current reason for admission. You have received a copy of the report from the diagnostic test(s), CT abdomen and pelvis with the abnormality(s) listed below. There is a large cervical cyst identified at 5.3 cm which can be further characterized with ultrasound if clinically indicated. Per our discussion, you have been notified of these incidental findings and have agreed to follow up with a Primary Care Physician. If a Primary Care Physician is needed, please call [phone]. For any questions or concerns, please call our office at [phone].     Sincerely,        Waldo Hutchins PA-C  [facility]  Emergency General Surgery Service    cc:  Medical Records      I have read and understand the above recommendations:        Signature (Relationship to patient)

## 2023-02-26 NOTE — PROGRESS NOTES
DVT / VTE PROPHYLAXIS EVALUATION    Estimated Creatinine Clearance: 40 mL/min (based on SCr of 0.84 mg/dL). Recent Labs     02/26/23  0715   BUN 15   CREATININE 0.84   *   HGB 10.8*   HCT 32.8*   INR 1.1     ADMITTING DX OR CHIEF COMPLAINT? fall  WARFARIN? DOAC'S? no  ANY APPARENT BLEEDING? no  SCHEDULED SURGERY? unknown     Current order:  Enoxaparin 30 mg SUBQ twice daily      Plan:  No intervention recommended, continue current VTE prophylaxis as ordered     Patient Weight (kg)      50.9 and below .9 101-150.9 151-174.9 175 or greater   Estimated   CrCl  (ml/min) 30 or greater []   30 mg   SUBQ daily   []   40 mg   SUBQ daily []  30 mg SUBQ   BID  []  40 mg   SUBQ   BID []  60mg SUBQ BID    15-29.9 []  UFH 5000   units SUBQ BID []  30 mg   SUBQ daily [] 30 mg SUBQ   daily []  40 mg SUBQ   daily [] 60 mg SUBQ   daily    Less than 15 or dialysis []  UFH 5000   units SUBQ BID [] UFH 5000 units SUBQ TID []  UFH 7500   units   SUBQ TID       Trauma dosing monitor renal function.      Ivette Orta, [facility] PharmD

## 2023-02-27 ENCOUNTER — ANESTHESIA EVENT (OUTPATIENT)
Dept: OPERATING ROOM | Age: 83
End: 2023-02-27
Payer: MEDICARE

## 2023-02-27 ENCOUNTER — ANESTHESIA (OUTPATIENT)
Dept: OPERATING ROOM | Age: 83
End: 2023-02-27
Payer: MEDICARE

## 2023-02-27 ENCOUNTER — APPOINTMENT (OUTPATIENT)
Dept: GENERAL RADIOLOGY | Age: 83
DRG: 469 | End: 2023-02-27
Payer: MEDICARE

## 2023-02-27 PROBLEM — S32.422A CLOSED DISPLACED FRACTURE OF POSTERIOR WALL OF LEFT ACETABULUM (HCC): Status: ACTIVE | Noted: 2023-02-27

## 2023-02-27 PROCEDURE — 6360000002 HC RX W HCPCS: Performed by: STUDENT IN AN ORGANIZED HEALTH CARE EDUCATION/TRAINING PROGRAM

## 2023-02-27 PROCEDURE — 1210000000 HC MED SURG R&B

## 2023-02-27 PROCEDURE — 3209999900 FLUORO FOR SURGICAL PROCEDURES

## 2023-02-27 PROCEDURE — 6360000002 HC RX W HCPCS: Performed by: PHYSICIAN ASSISTANT

## 2023-02-27 PROCEDURE — 99222 1ST HOSP IP/OBS MODERATE 55: CPT | Performed by: STUDENT IN AN ORGANIZED HEALTH CARE EDUCATION/TRAINING PROGRAM

## 2023-02-27 PROCEDURE — C1713 ANCHOR/SCREW BN/BN,TIS/BN: HCPCS | Performed by: STUDENT IN AN ORGANIZED HEALTH CARE EDUCATION/TRAINING PROGRAM

## 2023-02-27 PROCEDURE — 27134 REVISE HIP JOINT REPLACEMENT: CPT | Performed by: STUDENT IN AN ORGANIZED HEALTH CARE EDUCATION/TRAINING PROGRAM

## 2023-02-27 PROCEDURE — 2580000003 HC RX 258: Performed by: PHYSICIAN ASSISTANT

## 2023-02-27 PROCEDURE — 3700000001 HC ADD 15 MINUTES (ANESTHESIA): Performed by: STUDENT IN AN ORGANIZED HEALTH CARE EDUCATION/TRAINING PROGRAM

## 2023-02-27 PROCEDURE — 6360000002 HC RX W HCPCS: Performed by: ANESTHESIOLOGY

## 2023-02-27 PROCEDURE — 7100000001 HC PACU RECOVERY - ADDTL 15 MIN: Performed by: STUDENT IN AN ORGANIZED HEALTH CARE EDUCATION/TRAINING PROGRAM

## 2023-02-27 PROCEDURE — 3600000014 HC SURGERY LEVEL 4 ADDTL 15MIN: Performed by: STUDENT IN AN ORGANIZED HEALTH CARE EDUCATION/TRAINING PROGRAM

## 2023-02-27 PROCEDURE — 2580000003 HC RX 258: Performed by: STUDENT IN AN ORGANIZED HEALTH CARE EDUCATION/TRAINING PROGRAM

## 2023-02-27 PROCEDURE — 6370000000 HC RX 637 (ALT 250 FOR IP): Performed by: PHYSICIAN ASSISTANT

## 2023-02-27 PROCEDURE — 2500000003 HC RX 250 WO HCPCS: Performed by: STUDENT IN AN ORGANIZED HEALTH CARE EDUCATION/TRAINING PROGRAM

## 2023-02-27 PROCEDURE — C1776 JOINT DEVICE (IMPLANTABLE): HCPCS | Performed by: STUDENT IN AN ORGANIZED HEALTH CARE EDUCATION/TRAINING PROGRAM

## 2023-02-27 PROCEDURE — 0SRS0J9 REPLACEMENT OF LEFT HIP JOINT, FEMORAL SURFACE WITH SYNTHETIC SUBSTITUTE, CEMENTED, OPEN APPROACH: ICD-10-PCS | Performed by: STUDENT IN AN ORGANIZED HEALTH CARE EDUCATION/TRAINING PROGRAM

## 2023-02-27 PROCEDURE — 2709999900 HC NON-CHARGEABLE SUPPLY: Performed by: STUDENT IN AN ORGANIZED HEALTH CARE EDUCATION/TRAINING PROGRAM

## 2023-02-27 PROCEDURE — 73502 X-RAY EXAM HIP UNI 2-3 VIEWS: CPT

## 2023-02-27 PROCEDURE — 73501 X-RAY EXAM HIP UNI 1 VIEW: CPT

## 2023-02-27 PROCEDURE — 0SRB0JA REPLACEMENT OF LEFT HIP JOINT WITH SYNTHETIC SUBSTITUTE, UNCEMENTED, OPEN APPROACH: ICD-10-PCS | Performed by: STUDENT IN AN ORGANIZED HEALTH CARE EDUCATION/TRAINING PROGRAM

## 2023-02-27 PROCEDURE — 2500000003 HC RX 250 WO HCPCS: Performed by: ANESTHESIOLOGY

## 2023-02-27 PROCEDURE — 3700000000 HC ANESTHESIA ATTENDED CARE: Performed by: STUDENT IN AN ORGANIZED HEALTH CARE EDUCATION/TRAINING PROGRAM

## 2023-02-27 PROCEDURE — 2720000010 HC SURG SUPPLY STERILE: Performed by: STUDENT IN AN ORGANIZED HEALTH CARE EDUCATION/TRAINING PROGRAM

## 2023-02-27 PROCEDURE — 7100000000 HC PACU RECOVERY - FIRST 15 MIN: Performed by: STUDENT IN AN ORGANIZED HEALTH CARE EDUCATION/TRAINING PROGRAM

## 2023-02-27 PROCEDURE — 88300 SURGICAL PATH GROSS: CPT

## 2023-02-27 PROCEDURE — 3600000004 HC SURGERY LEVEL 4 BASE: Performed by: STUDENT IN AN ORGANIZED HEALTH CARE EDUCATION/TRAINING PROGRAM

## 2023-02-27 PROCEDURE — 64447 NJX AA&/STRD FEMORAL NRV IMG: CPT | Performed by: STUDENT IN AN ORGANIZED HEALTH CARE EDUCATION/TRAINING PROGRAM

## 2023-02-27 PROCEDURE — 6360000002 HC RX W HCPCS: Performed by: NURSE ANESTHETIST, CERTIFIED REGISTERED

## 2023-02-27 PROCEDURE — 0SPB0JZ REMOVAL OF SYNTHETIC SUBSTITUTE FROM LEFT HIP JOINT, OPEN APPROACH: ICD-10-PCS | Performed by: STUDENT IN AN ORGANIZED HEALTH CARE EDUCATION/TRAINING PROGRAM

## 2023-02-27 DEVICE — TRIDENT X3 10 DEGREE POLYETHYLENE INSERT
Type: IMPLANTABLE DEVICE | Site: HIP | Status: FUNCTIONAL
Brand: TRIDENT X3 INSERT

## 2023-02-27 DEVICE — LFIT V40 FEMORAL HEAD
Type: IMPLANTABLE DEVICE | Status: FUNCTIONAL
Brand: V40 HEAD

## 2023-02-27 DEVICE — SCREW BNE L30MM DIA6.5MM STD CANC HIP TI HMSPHR THRD DRVR: Type: IMPLANTABLE DEVICE | Site: HIP | Status: FUNCTIONAL

## 2023-02-27 DEVICE — SCREW BNE L15MM DIA6.5MM LO PROF HEX TRIDENT LL: Type: IMPLANTABLE DEVICE | Status: FUNCTIONAL

## 2023-02-27 DEVICE — IMPLANTABLE DEVICE: Type: IMPLANTABLE DEVICE | Site: HIP | Status: FUNCTIONAL

## 2023-02-27 DEVICE — SCREW BNE L25MM DIA6.5MM HEX LO PROF TRIDENT II: Type: IMPLANTABLE DEVICE | Site: HIP | Status: FUNCTIONAL

## 2023-02-27 RX ORDER — ONDANSETRON 2 MG/ML
4 INJECTION INTRAMUSCULAR; INTRAVENOUS
Status: DISCONTINUED | OUTPATIENT
Start: 2023-02-27 | End: 2023-02-27 | Stop reason: HOSPADM

## 2023-02-27 RX ORDER — ACETAMINOPHEN 325 MG/1
650 TABLET ORAL EVERY 4 HOURS
Status: DISCONTINUED | OUTPATIENT
Start: 2023-02-27 | End: 2023-03-02 | Stop reason: HOSPADM

## 2023-02-27 RX ORDER — CLINDAMYCIN PHOSPHATE 900 MG/50ML
900 INJECTION INTRAVENOUS EVERY 8 HOURS
Status: COMPLETED | OUTPATIENT
Start: 2023-02-27 | End: 2023-02-28

## 2023-02-27 RX ORDER — KETOROLAC TROMETHAMINE 15 MG/ML
15 INJECTION, SOLUTION INTRAMUSCULAR; INTRAVENOUS EVERY 6 HOURS
Status: DISCONTINUED | OUTPATIENT
Start: 2023-02-27 | End: 2023-02-28

## 2023-02-27 RX ORDER — PROPOFOL 10 MG/ML
INJECTION, EMULSION INTRAVENOUS CONTINUOUS PRN
Status: DISCONTINUED | OUTPATIENT
Start: 2023-02-27 | End: 2023-02-27 | Stop reason: SDUPTHER

## 2023-02-27 RX ORDER — SODIUM CHLORIDE 0.9 % (FLUSH) 0.9 %
5-40 SYRINGE (ML) INJECTION PRN
Status: DISCONTINUED | OUTPATIENT
Start: 2023-02-27 | End: 2023-02-27 | Stop reason: HOSPADM

## 2023-02-27 RX ORDER — OXYCODONE HYDROCHLORIDE 5 MG/1
5 TABLET ORAL EVERY 4 HOURS PRN
Status: DISCONTINUED | OUTPATIENT
Start: 2023-02-27 | End: 2023-03-01

## 2023-02-27 RX ORDER — OXYCODONE HYDROCHLORIDE 5 MG/1
10 TABLET ORAL EVERY 4 HOURS PRN
Status: DISCONTINUED | OUTPATIENT
Start: 2023-02-27 | End: 2023-03-01

## 2023-02-27 RX ORDER — FENTANYL CITRATE 0.05 MG/ML
25 INJECTION, SOLUTION INTRAMUSCULAR; INTRAVENOUS EVERY 5 MIN PRN
Status: COMPLETED | OUTPATIENT
Start: 2023-02-27 | End: 2023-02-27

## 2023-02-27 RX ORDER — FENTANYL CITRATE 0.05 MG/ML
50 INJECTION, SOLUTION INTRAMUSCULAR; INTRAVENOUS EVERY 5 MIN PRN
Status: DISCONTINUED | OUTPATIENT
Start: 2023-02-27 | End: 2023-02-27 | Stop reason: HOSPADM

## 2023-02-27 RX ORDER — SODIUM CHLORIDE 9 MG/ML
INJECTION, SOLUTION INTRAVENOUS PRN
Status: DISCONTINUED | OUTPATIENT
Start: 2023-02-27 | End: 2023-02-27 | Stop reason: HOSPADM

## 2023-02-27 RX ORDER — LIDOCAINE 4 G/G
2 PATCH TOPICAL DAILY
Status: DISCONTINUED | OUTPATIENT
Start: 2023-02-27 | End: 2023-03-02 | Stop reason: HOSPADM

## 2023-02-27 RX ORDER — METHOCARBAMOL 750 MG/1
750 TABLET, FILM COATED ORAL 4 TIMES DAILY
Status: DISCONTINUED | OUTPATIENT
Start: 2023-02-27 | End: 2023-03-02 | Stop reason: HOSPADM

## 2023-02-27 RX ORDER — SODIUM CHLORIDE 0.9 % (FLUSH) 0.9 %
5-40 SYRINGE (ML) INJECTION EVERY 12 HOURS SCHEDULED
Status: DISCONTINUED | OUTPATIENT
Start: 2023-02-27 | End: 2023-02-27 | Stop reason: HOSPADM

## 2023-02-27 RX ORDER — LIDOCAINE HYDROCHLORIDE 20 MG/ML
INJECTION, SOLUTION EPIDURAL; INFILTRATION; INTRACAUDAL; PERINEURAL PRN
Status: DISCONTINUED | OUTPATIENT
Start: 2023-02-27 | End: 2023-02-27 | Stop reason: SDUPTHER

## 2023-02-27 RX ORDER — CLINDAMYCIN PHOSPHATE 900 MG/50ML
900 INJECTION INTRAVENOUS
Status: COMPLETED | OUTPATIENT
Start: 2023-02-27 | End: 2023-02-27

## 2023-02-27 RX ORDER — BUPIVACAINE HYDROCHLORIDE 7.5 MG/ML
INJECTION, SOLUTION INTRASPINAL PRN
Status: DISCONTINUED | OUTPATIENT
Start: 2023-02-27 | End: 2023-02-27 | Stop reason: SDUPTHER

## 2023-02-27 RX ORDER — ROPIVACAINE HYDROCHLORIDE 5 MG/ML
INJECTION, SOLUTION EPIDURAL; INFILTRATION; PERINEURAL PRN
Status: DISCONTINUED | OUTPATIENT
Start: 2023-02-27 | End: 2023-02-27 | Stop reason: SDUPTHER

## 2023-02-27 RX ADMIN — ENOXAPARIN SODIUM 30 MG: 100 INJECTION SUBCUTANEOUS at 20:30

## 2023-02-27 RX ADMIN — ROPIVACAINE HYDROCHLORIDE 15 ML: 5 INJECTION, SOLUTION EPIDURAL; INFILTRATION; PERINEURAL at 13:10

## 2023-02-27 RX ADMIN — PHENYLEPHRINE HYDROCHLORIDE 100 MCG: 10 INJECTION INTRAVENOUS at 14:00

## 2023-02-27 RX ADMIN — PHENYLEPHRINE HYDROCHLORIDE 200 MCG: 10 INJECTION INTRAVENOUS at 13:46

## 2023-02-27 RX ADMIN — KETOROLAC TROMETHAMINE 15 MG: 15 INJECTION, SOLUTION INTRAMUSCULAR; INTRAVENOUS at 23:25

## 2023-02-27 RX ADMIN — DONEPEZIL HYDROCHLORIDE 5 MG: 5 TABLET, FILM COATED ORAL at 10:49

## 2023-02-27 RX ADMIN — Medication 10 ML: at 20:47

## 2023-02-27 RX ADMIN — Medication 10 MG: at 10:49

## 2023-02-27 RX ADMIN — PHENYLEPHRINE HYDROCHLORIDE 200 MCG: 10 INJECTION INTRAVENOUS at 14:10

## 2023-02-27 RX ADMIN — CLINDAMYCIN PHOSPHATE 900 MG: 900 INJECTION, SOLUTION INTRAVENOUS at 23:25

## 2023-02-27 RX ADMIN — VANCOMYCIN HYDROCHLORIDE 1000 MG: 1 INJECTION, POWDER, LYOPHILIZED, FOR SOLUTION INTRAVENOUS at 13:30

## 2023-02-27 RX ADMIN — TRANEXAMIC ACID 1000 MG: 1 INJECTION, SOLUTION INTRAVENOUS at 14:49

## 2023-02-27 RX ADMIN — SODIUM CHLORIDE, POTASSIUM CHLORIDE, SODIUM LACTATE AND CALCIUM CHLORIDE: 600; 310; 30; 20 INJECTION, SOLUTION INTRAVENOUS at 14:42

## 2023-02-27 RX ADMIN — QUETIAPINE FUMARATE 12.5 MG: 25 TABLET ORAL at 20:47

## 2023-02-27 RX ADMIN — PROPOFOL 85 MCG/KG/MIN: 10 INJECTION, EMULSION INTRAVENOUS at 13:48

## 2023-02-27 RX ADMIN — PHENYLEPHRINE HYDROCHLORIDE 100 MCG: 10 INJECTION INTRAVENOUS at 14:23

## 2023-02-27 RX ADMIN — LIDOCAINE HYDROCHLORIDE 5 ML: 20 INJECTION, SOLUTION EPIDURAL; INFILTRATION; INTRACAUDAL; PERINEURAL at 13:10

## 2023-02-27 RX ADMIN — METHOCARBAMOL 750 MG: 750 TABLET ORAL at 20:30

## 2023-02-27 RX ADMIN — PHENYLEPHRINE HYDROCHLORIDE 100 MCG: 10 INJECTION INTRAVENOUS at 14:28

## 2023-02-27 RX ADMIN — CLINDAMYCIN PHOSPHATE 900 MG: 900 INJECTION, SOLUTION INTRAVENOUS at 14:12

## 2023-02-27 RX ADMIN — Medication 1000 UNITS: at 10:49

## 2023-02-27 RX ADMIN — SODIUM CHLORIDE, POTASSIUM CHLORIDE, SODIUM LACTATE AND CALCIUM CHLORIDE: 600; 310; 30; 20 INJECTION, SOLUTION INTRAVENOUS at 10:48

## 2023-02-27 RX ADMIN — PROPOFOL 30 MG: 10 INJECTION, EMULSION INTRAVENOUS at 14:08

## 2023-02-27 RX ADMIN — PHENYLEPHRINE HYDROCHLORIDE 100 MCG: 10 INJECTION INTRAVENOUS at 14:16

## 2023-02-27 RX ADMIN — PROPOFOL 30 MG: 10 INJECTION, EMULSION INTRAVENOUS at 14:01

## 2023-02-27 RX ADMIN — Medication 10 ML: at 12:28

## 2023-02-27 RX ADMIN — ACETAMINOPHEN 650 MG: 325 TABLET ORAL at 23:18

## 2023-02-27 RX ADMIN — FENTANYL CITRATE 25 MCG: 0.05 INJECTION, SOLUTION INTRAMUSCULAR; INTRAVENOUS at 16:32

## 2023-02-27 RX ADMIN — BUPIVACAINE HYDROCHLORIDE 1.2 ML: 7.5 INJECTION, SOLUTION INTRASPINAL at 13:29

## 2023-02-27 RX ADMIN — FENTANYL CITRATE 25 MCG: 0.05 INJECTION, SOLUTION INTRAMUSCULAR; INTRAVENOUS at 16:38

## 2023-02-27 RX ADMIN — PHENYLEPHRINE HYDROCHLORIDE 200 MCG: 10 INJECTION INTRAVENOUS at 14:03

## 2023-02-27 RX ADMIN — TRANEXAMIC ACID 1000 MG: 1 INJECTION, SOLUTION INTRAVENOUS at 15:40

## 2023-02-27 RX ADMIN — SENNOSIDES AND DOCUSATE SODIUM 1 TABLET: 50; 8.6 TABLET ORAL at 20:30

## 2023-02-27 ASSESSMENT — PAIN - FUNCTIONAL ASSESSMENT
PAIN_FUNCTIONAL_ASSESSMENT: PREVENTS OR INTERFERES SOME ACTIVE ACTIVITIES AND ADLS
PAIN_FUNCTIONAL_ASSESSMENT: PREVENTS OR INTERFERES SOME ACTIVE ACTIVITIES AND ADLS

## 2023-02-27 ASSESSMENT — PAIN SCALES - GENERAL
PAINLEVEL_OUTOF10: 0
PAINLEVEL_OUTOF10: 5
PAINLEVEL_OUTOF10: 5

## 2023-02-27 ASSESSMENT — PAIN DESCRIPTION - DESCRIPTORS
DESCRIPTORS: OTHER (COMMENT)
DESCRIPTORS: ACHING
DESCRIPTORS: OTHER (COMMENT)
DESCRIPTORS: ACHING
DESCRIPTORS: OTHER (COMMENT)
DESCRIPTORS: OTHER (COMMENT)
DESCRIPTORS: ACHING

## 2023-02-27 ASSESSMENT — PAIN DESCRIPTION - ORIENTATION
ORIENTATION: RIGHT
ORIENTATION: LEFT
ORIENTATION: RIGHT
ORIENTATION: RIGHT
ORIENTATION: LEFT
ORIENTATION: LEFT

## 2023-02-27 ASSESSMENT — PAIN DESCRIPTION - LOCATION
LOCATION: HIP
LOCATION: HIP;LEG
LOCATION: HIP
LOCATION: HIP
LOCATION: HIP;LEG
LOCATION: HIP;LEG
LOCATION: HIP
LOCATION: HIP

## 2023-02-27 NOTE — ANESTHESIA PROCEDURE NOTES
Spinal Block    Patient location during procedure: pre-op  End time: 2/27/2023 1:29 PM  Reason for block: primary anesthetic  Staffing  Performed: anesthesiologist   Anesthesiologist: Sandra Monzon MD  Spinal Block  Patient position: right lateral decubitus  Prep: Betadine  Patient monitoring: cardiac monitor, continuous pulse ox and frequent blood pressure checks  Approach: midline  Location: L2/L3  Provider prep: mask and sterile gloves  Local infiltration: lidocaine  Needle  Needle type: Quincke   Needle gauge: 22 G  Needle length: 3.5 in  Assessment  Sensory level: T6  Events: cerebrospinal fluid  Lysis level: CSF aspirated. CSF: clear  Attempts: 3+  Hemodynamics: stable  Additional Notes  Free flowing CSF. Negative heme. Negative paresthesia.   .  Preanesthetic Checklist  Completed: patient identified, IV checked, site marked, risks and benefits discussed, surgical/procedural consents, equipment checked, pre-op evaluation, timeout performed, anesthesia consent given, oxygen available and monitors applied/VS acknowledged

## 2023-02-27 NOTE — PROGRESS NOTES
Brief orthopedic surgery note-     Patient presented over the weekend with a posterior dislocation after a fall at her [facility] care facility. She is status post recent left hip hemiarthroplasty for treatment of femoral neck fracture. Dr. Carline Gutierrez was on-call this weekend and discussed the case with me over the phone. I am taking over her care today. I performed the prior left hip hemiarthroplasty. I was not contacted prior to the reduction but with the posterior dislocation it appears emergency department went ahead with attempted reduction of the left hip with a few attempts. During the reduction attempts though the outer ball of the bipolar head dissociated. The inner ball was reduced into the native acetabulum however the outer ball remains in the wrong location. There is very low probability that further closed reduction attempts would be able to reassociate the outer ball with the inner ball. Plan is to be further discussed in person with the niece who is her power of . My plan would be to open the hip and remove the outer ball and either give her more leg length and offset with a unipolar device so that in the event she dislocates again this can be more easily reduced to the emergency department without having the risk of intra prosthetic dissociation versus conversion to a total hip replacement where I can modify the anteversion of the acetabulum and place an elevated liner. I have obtained a CT scan of the pelvis which I reviewed and I do not see evidence of a posterior wall fracture though the study is limited due to metal artifact and diffuse osteopenia. I have some concern for a posterior wall fracture based on the x-rays. If intraoperatively there is indeed a posterior wall fracture the plan would be to convert to a total hip replacement with multiple screws in the acetabular component. Please keep the patient n.p.o. except po meds.   I will plan to meet with the niece this afternoon and discuss the options. If she is interested in pursuing further surgery I will ask the OR to reserve some time for me this afternoon.     Dr. Lord Ruffin

## 2023-02-27 NOTE — BRIEF OP NOTE
Brief Postoperative Note      Patient: Zacarias Ortiz  YOB: 1940  MRN: [id number]    Date of Procedure: 2/27/2023    Pre-Op Diagnosis: POSTERIOR DISLOCATION OF LEFT HIP    Post-Op Diagnosis:   Left hemiarthroplasty dislocation with dissociation of the outer ball  Left posterior wall acetabular fracture         Procedure(s): Revision left total hip arthroplasty    Surgeon(s): Josiah Guevara MD    Assistant:  First Assistant: Jacquiline Meigs    Anesthesia: General, spinal, regional block    Estimated Blood Loss (mL): 766 ml    Complications: None    Specimens:   ID Type Source Tests Collected by Time Destination   A : LEFT HIP HARDWARE - FOR IDENTIFICATION Hardware Joint, Hip SURGICAL PATHOLOGY Josiah Guevara MD 2/27/2023 1457        Implants:  Implant Name Type Inv. Item Serial No.  Lot No. LRB No. Used Action   SHELL ACET MULT HOLE D 50 MM TRIDENT II TRIATHLON - [id number]  SHELL ACET MULT HOLE D 50 MM TRIDENT II TRIATHLON  [employer] [facility] [id number] Left 1 Implanted   INSERT ACET D 10 DEG 32 MM HIP X3 TRIDENT - [id number]  INSERT ACET D 10 DEG 32 MM HIP X3 TRIDENT  [employer] [facility] [id number] Left 1 Implanted   SCREW BNE L25MM DIA6. 5MM HEX  PROF TRIDENT II - [id number]  SCREW BNE L25MM DIA6. 5MM HEX  PROF TRIDENT II  [employer] [facility] [id number] Left 1 Implanted   SCREW BNE L30MM DIA6.5MM STD CANC HIP TI HMSPHR THRD DRVR - [id number]  SCREW BNE L30MM DIA6.5MM STD CANC HIP TI HMSPHR THRD DRVR  [employer] [facility] [id number] Left 1 Implanted   HEAD FEM [id number] +12MM OFFSET HIP CO CHROM POLYETH TAPR  - [id number]  HEAD FEM [id number] +12MM OFFSET HIP CO CHROM POLYETH TAPR   [employer] [facility] [id number] Left 1 Implanted         Drains: none    Findings: left hip hemiarthroplasty dislocation with dissociation of the outer ball, posterior wall acetabular fracture    Electronically signed by Josiah Guevara MD on 2/27/2023 at 3:57 PM

## 2023-02-27 NOTE — CARE COORDINATION
Case Management Assessment  Initial Evaluation    Date/Time of Evaluation: 2/26/2023 7:09 PM  Assessment Completed by: Fidel Sesay RN    If patient is discharged prior to next notation, then this note serves as note for discharge by case management. Patient Name: Yesenia Vera                   YOB: 1940  Diagnosis: Frequent falls [R29.6]  Posterior dislocation of left hip, initial encounter [facility]) [S73.015A]  [facility] joint [facility] [G24.387T, Z96.649]                   Date / Time: 2/26/2023  6:49 AM    Patient Admission Status: Inpatient   Readmission Risk (Low < 19, Mod (19-27), High > 27): Readmission Risk Score: 16.9    Current PCP: Monisha Bermudez MD  PCP verified by CM? (P) Yes (currently she is seen by the facility Dr)    Chart Reviewed: Yes      History Provided by: Child/Family  Patient Orientation: Other (see comment) (confused)    Patient Cognition: Dementia / Early Alzheimer's    Hospitalization in the last 30 days (Readmission):  Yes    If yes, Readmission Assessment in CM Navigator will be completed. Advance Directives:      Code Status: DNR-CCA   Patient's Primary Decision Maker is: (P) Named in [address] [place] [address]    Primary Decision MakerJacoarminda Gene - Niece/Nephew - [phone]    Secondary Decision Maker: Patrick Lira - [phone]    Discharge Planning:    Patient lives with:   Type of Home:    Primary Care Giver:  Other (Comment) ([facility])  Patient Support Systems include: Family Members   Current Financial resources: (P) Medicare  Current community resources:    Current services prior to admission:              Current DME:              Type of Home Care services:       ADLS  Prior functional level: (P) Assistance with the following: (assistance as needed at the facility)  Current functional level:      PT AM-PAC:   /24  OT AM-PAC:   /24    Family can provide assistance at DC: (P) Other (comment) (plan is for her to return to [employer])  Would you like Case Management to discuss the discharge plan with any other family members/significant others, and if so, who? (P) Yes (niece Noah Henson)  Plans to Return to Present Housing: (P) Yes  Other Identified Issues/Barriers to RETURNING to current housing: no  Potential Assistance needed at discharge:              Potential DME:    Patient expects to discharge to: (P) Long-term care  Plan for transportation at discharge:      Financial    Payor: Kizzy Saul / Plan: Priyanka Swain ESSENTIAL/PLUS / Product Type: *No Product type* /     Does insurance require precert for SNF: Yes    Potential assistance Purchasing Medications:    Meds-to-Beds request:      No Pharmacies Listed    Notes:    Factors facilitating achievement of predicted outcomes: Family support, Cooperative, and Pleasant    Barriers to discharge: if she would need a higher level of care than what [facility] can provide    Additional Case Management Notes: pt's niece states that she can receive therapy at [facility] and she would prefer that she return there. The Plan for Transition of Care is related to the following treatment goals of Frequent falls [R29.6]  Posterior dislocation of left hip, initial encounter [facility]) [S73.015A]  Dis[facility] hip joint [facility] [Q13.663Z, F56.326]    IF APPLICABLE: The Patient and/or patient representative Kyle Hermosillo and her family were provided with a choice of provider and agrees with the discharge plan. Freedom of choice list with basic dialogue that supports the patient's individualized plan of care/goals and shares the quality data associated with the providers was provided to:     Patient Representative Name:       The Patient and/or Patient Representative Agree with the Discharge Plan?       Elidia Kunz RN  Case Management Department

## 2023-02-27 NOTE — PROGRESS NOTES
I reviewed the immediate PACU xray and compared to our intraop fluoroscopic images. We have obtained additional films of the left hip in pacu. She appears to have had some interval protrusio and the most inferior acetabular screw is no longer in bone. The other two screws appear to be in bone. I have reviewed the imaging with her POA in the waiting area. Discussed he options of returning to the OR now for revision of the acetabular component vs observation with protected weightbearing. She understands there is possibility that the cup remains in a stable position but I would limit her weightbearing for at least 6 weeks. There is also a risk of further protrusio which would potentially require greater revision surgery. She wished to proceed with observation and we will keep her touch down weightbearing on the LLE. Plan to obtain serial xrays on a weekly basis.

## 2023-02-27 NOTE — CONSULTS
History and Physical  Patient: Jacki Rojas [facility]  Unit/Bed:W269/W269-01  YOB: 1940  MRN: [id number]  Acct: [de-identified]   Admitting Diagnosis: Frequent falls [R29.6]  Posterior dislocation of left hip, initial encounter [facility]) [S73.015A]  Dislocation of hip joint prosthesis, initial encounter [facility]) [W07.016B, [address] Date:  2/26/2023  Hospital Day: 1      Chief Complaint:   Left hip dislocation    History of Present Illness:  Patient presented to ED yesterday with fall at facility. Found to have a left hip dislocation. She is s/p left hip hemiarthroplasty for displaced femoral neck fracture a few weeks ago. I spoke with her POA in person today. Cisco Ortiz is not able to participate in the medical interview due to dementia.      Allergies   Allergen Reactions    Bactrim [Sulfamethoxazole-Trimethoprim]     Bee Venom     Ceftin [Cefuroxime]     Wasp Venom        Current Facility-Administered Medications   Medication Dose Route Frequency Provider Last Rate Last Admin    acetaminophen (TYLENOL) tablet 650 mg  650 mg Oral Q4H [address] [facility] JOEL Carver        methocarbamol (ROBAXIN) tablet 750 mg  750 mg Oral 4x Daily [address] [facility] JOEL Carver        lidocaine 4 % external patch 2 patch  2 patch TransDERmal Daily Génesis Rodriguez PA-C        oxyCODONE (ROXICODONE) immediate release tablet 5 mg  5 mg Oral Q4H PRN Génesis Rodriguez PA-C        Or    oxyCODONE (ROXICODONE) immediate release tablet 10 mg  10 mg Oral Q4H PRN Génesis Rodriguez PA-C        ketorolac (TORADOL) injection 15 mg  15 mg IntraVENous Q6H Génesis Rodriguez PA-C        HYDROmorphone (DILAUDID) injection 0.25 mg  0.25 mg IntraVENous Q4H PRN Génesis Rodriguez PA-C        clindamycin (CLEOCIN) 900 mg in dextrose 5 % 50 mL IVPB  900 mg IntraVENous On Call to Kemal Spaulding MD        vancomycin 1000 mg IVPB in 250 mL NS addavial  1,000 mg IntraVENous On Call to OR Jonn Giles MD        donepezil (ARICEPT) tablet 5 mg  5 mg Oral Daily [place], [place]   5 mg at 02/27/23 1049    enoxaparin Sodium (LOVENOX) injection 30 mg  30 mg SubCUTAneous BID Rashel Aguilera PA-C        QUEtiapine (SEROQUEL) tablet 12.5 mg  12.5 mg Oral Nightly Rashel Aguilera PA-C   12.5 mg at 02/26/23 2127    Vitamin D (CHOLECALCIFEROL) tablet 1,000 Units  1,000 Units Oral Daily [place], [place]   1,000 Units at 02/27/23 1049    sodium chloride flush 0.9 % injection 10 mL  10 mL IntraVENous 2 times per day Rashel Aguilera PA-C   10 mL at 02/27/23 1228    sodium chloride flush 0.9 % injection 10 mL  10 mL IntraVENous PRN Rashel Aguilera PA-C        0.9 % sodium chloride infusion   IntraVENous PRN Rashel Aguilera PA-C        ondansetron (ZOFRAN-ODT) disintegrating tablet 4 mg  4 mg Oral Q8H PRN Rashel Aguilera PA-C        Or    ondansetron [facility]) injection 4 mg  4 mg IntraVENous Q6H PRN Rashel Aguilera PA-C        sennosides-docusate sodium (SENOKOT-S) 8.6-50 MG tablet 1 tablet  1 tablet Oral BID Rashel Aguilera PA-C   1 tablet at 02/26/23 2127    magnesium hydroxide (MILK OF MAGNESIA) 400 MG/5ML suspension 30 mL  30 mL Oral Daily PRN Rashel Aguilera PA-C        bisacodyl (DULCOLAX) suppository 10 mg  10 mg Rectal Daily PRN Rashel Aguilera PA-C   10 mg at 02/27/23 1049    lactated ringers IV soln infusion   IntraVENous Continuous Rashel Aguilera PA-C 100 mL/hr at 02/27/23 1048 New Bag at 02/27/23 1048     Facility-Administered Medications Ordered in Other Encounters   Medication Dose Route Frequency Provider Last Rate Last Admin    ropivacaine (NAROPIN) 0.5% injection   Perineural PRN Jennie Tamez MD   15 mL at 02/27/23 1310    lidocaine PF 2 % injection   Perineural PRN Jennie Tamez MD   5 mL at 02/27/23 1310 PMHx:  Past Medical History:   Diagnosis Date    Asymptomatic varicose veins of both lower extremities     Dementia with behavioral disturbance     Elevated blood pressure reading     Full dentures     History of hip fracture 2012    Small vessel disease, cerebrovascular        PSHx:  Past Surgical History:   Procedure Laterality Date    HIP FRACTURE SURGERY  2012    HIP SURGERY Left 2/12/2023    LEFT HIP HEMIARTHROPLASTY, PROPHYLACTIC STABILIZATION OF LEFT FEMUR performed by Gen Jose MD at [place] [id number] Hx:  Social History     Socioeconomic History    Marital status:       Spouse name: None    Number of children: None    Years of education: None    Highest education level: None   Tobacco Use    Smoking status: Unknown    Smokeless tobacco: Never   Vaping Use    Vaping Use: Never used   Social History Narrative    No children    States she has worked in [employer] in [address] dementia with behavior problems in 2020    Resides at [facility]     Social Determinants of Health     Physical Activity: Insufficiently Active    Days of Exercise per Week: 2 days    Minutes of Exercise per Session: 30 min       Family Hx:  Family History   Family history unknown: Yes       Review ofSystems:   Review of Systems- unable to obtain due to baseline mental status       Physical Examination:    BP (!) 124/57   Pulse (!) 104   Temp 98.3 °F (36.8 °C) (Temporal)   Resp 20   Wt 130 lb (59 kg)   SpO2 93%   BMI 23.78 kg/m²    Physical Exam   Baseline altered mental status  LLE: incision well healed, no sign of infection, LLE slightly shorter compared to contralateral, unable to perform detailed neurological exam due to condition      LABS:  CBC:   Lab Results   Component Value Date/Time    WBC 9.0 02/26/2023 07:15 AM    RBC 3.90 02/26/2023 07:15 AM    HGB 10.8 02/26/2023 07:15 AM    HCT 32.8 02/26/2023 07:15 AM    MCV 84.1 02/26/2023 07:15 AM    MCH 27.6 02/26/2023 07:15 AM    MCHC 32.8 02/26/2023 07:15 AM    RDW 16.0 02/26/2023 07:15 AM     02/26/2023 07:15 AM    MPV 11.9 01/13/2021 12:00 AM     CBC with Differential:   Lab Results   Component Value Date/Time    WBC 9.0 02/26/2023 07:15 AM    RBC 3.90 02/26/2023 07:15 AM    HGB 10.8 02/26/2023 07:15 AM    HCT 32.8 02/26/2023 07:15 AM     02/26/2023 07:15 AM    MCV 84.1 02/26/2023 07:15 AM    MCH 27.6 02/26/2023 07:15 AM    MCHC 32.8 02/26/2023 07:15 AM    RDW 16.0 02/26/2023 07:15 AM    LYMPHOPCT 10.6 02/26/2023 07:15 AM    MONOPCT 9.4 02/26/2023 07:15 AM    EOSPCT 0.0 01/13/2021 12:00 AM    BASOPCT 0.5 02/26/2023 07:15 AM    MONOSABS 0.8 02/26/2023 07:15 AM    LYMPHSABS 1.0 02/26/2023 07:15 AM    EOSABS 0.0 02/26/2023 07:15 AM    BASOSABS 0.0 02/26/2023 07:15 AM     CMP:    Lab Results   Component Value Date/Time     02/26/2023 07:15 AM    K 4.0 02/26/2023 07:15 AM    K 3.9 01/22/2022 06:37 AM     02/26/2023 07:15 AM    CO2 25 02/26/2023 07:15 AM    BUN 15 02/26/2023 07:15 AM    CREATININE 0.84 02/26/2023 07:15 AM    GFRAA >60.0 01/22/2022 06:37 AM    LABGLOM >60.0 02/26/2023 07:15 AM    GLUCOSE 111 02/26/2023 07:15 AM    PROT 6.9 02/26/2023 07:15 AM    LABALBU 3.3 02/26/2023 07:15 AM    CALCIUM 9.6 02/26/2023 07:15 AM    BILITOT 0.5 02/26/2023 07:15 AM    ALKPHOS 104 02/26/2023 07:15 AM    AST 26 02/26/2023 07:15 AM    ALT 27 02/26/2023 07:15 AM     BMP:    Lab Results   Component Value Date/Time     02/26/2023 07:15 AM    K 4.0 02/26/2023 07:15 AM    K 3.9 01/22/2022 06:37 AM     02/26/2023 07:15 AM    CO2 25 02/26/2023 07:15 AM    BUN 15 02/26/2023 07:15 AM    LABALBU 3.3 02/26/2023 07:15 AM    CREATININE 0.84 02/26/2023 07:15 AM    CALCIUM 9.6 02/26/2023 07:15 AM    GFRAA >60.0 01/22/2022 06:37 AM    LABGLOM >60.0 02/26/2023 07:15 AM    GLUCOSE 111 02/26/2023 07:15 AM     Magnesium:  No results found for: MG  Troponin:    Lab Results   Component Value Date/Time    TROPONINI <0.10 01/13/2021 12:00 AM     No results for input(s): PROBNP in the last 72 hours. Recent Labs     02/26/23  0715   INR 1.1       RADIOLOGY:  XR PELVIS (1-2 VIEWS)    Result Date: 2/26/2023  EXAMINATION: ONE XRAY VIEW OF THE PELVIS 2/26/2023 8:42 am COMPARISON: The previous study performed earlier in the day. HISTORY: ORDERING SYSTEM PROVIDED HISTORY: reduction TECHNOLOGIST PROVIDED HISTORY: Reason for exam:->reduction What reading provider will be dictating this exam?->CRC FINDINGS: 2 postreduction images are provided. The 1st again reveals a left hip dislocation of the prosthesis, however, there is dislocation of the prosthetic femoral head of the prosthetic acetabulum. The 2nd post reduction image reveals relocation of the prosthetic femoral head within the native acetabulum, but there is still dislocation of the prosthetic acetabulum the patient is again status post total right hip arthroplasty. No other significant interval change is identified when compared to the earlier radiograph. 2 postreduction images provided with the 1st image again revealing a left hip dislocation of the prosthesis, however, there is dislocation of the prosthetic femoral head out of the prostatic acetabulum. The 2nd post reduction image reveals relocation of the prosthetic femoral head within the native acetabulum, but still dislocation of the prosthetic acetabulum. RECOMMENDATION:     XR PELVIS (1-2 VIEWS)    Result Date: 2/26/2023  EXAMINATION: ONE XRAY VIEW OF THE PELVIS 2/26/2023 7:19 am COMPARISON: X-ray 02/12/2023 HISTORY: ORDERING SYSTEM PROVIDED HISTORY: fall TECHNOLOGIST PROVIDED HISTORY: Reason for exam:->fall What reading provider will be dictating this exam?->CRC FINDINGS: Posterolateral dislocation of the left hip arthroplasty. At least questionable mild irregularity of the left posterolateral acetabular cup could represent small irregularity however no displaced fracture fragment.  Pelvic ring intact otherwise with right hip arthroplasty in place. Posterolateral dislocation of the left hip arthroplasty. At least questionable mild irregularity of the left posterolateral acetabular cup could represent small irregularity however no displaced fracture fragment. XR FEMUR LEFT (MIN 2 VIEWS)    Result Date: 2/26/2023  EXAMINATION: XRAY VIEWS OF THE LEFT FEMUR 2/26/2023 9:23 am COMPARISON: None. HISTORY: ORDERING SYSTEM PROVIDED HISTORY: pain TECHNOLOGIST PROVIDED HISTORY: Reason for exam:->pain What reading provider will be dictating this exam?->CRC FINDINGS: Four views of the left femur were obtained. There is dislocation of the left hip prosthesis. It appears that the acetabular cup is also dislodged from the native acetabulum. There is no fracture of the left femur. 1. Dislocation of the left hip prosthesis 2. Displacement of the acetabular component from the native acetabulum. CT HEAD WO CONTRAST    Result Date: 2/26/2023  EXAMINATION: CT OF THE HEAD WITHOUT CONTRAST  2/26/2023 9:22 am TECHNIQUE: CT of the head was performed without the administration of intravenous contrast. Automated exposure control, iterative reconstruction, and/or weight based adjustment of the mA/kV was utilized to reduce the radiation dose to as low as reasonably achievable. COMPARISON: None. HISTORY: ORDERING SYSTEM PROVIDED HISTORY: fall TECHNOLOGIST PROVIDED HISTORY: Reason for exam:->fall Has a \"code stroke\" or \"stroke alert\" been called? ->No Decision Support Exception - unselect if not a suspected or confirmed emergency medical condition->Emergency Medical Condition (MA) What reading provider will be dictating this exam?->CRC FINDINGS: BRAIN/VENTRICLES: There is no acute intracranial hemorrhage, mass effect or midline shift. No abnormal extra-axial fluid collection. The gray-white differentiation is maintained without evidence of an acute infarct. There is no evidence of hydrocephalus.  The ventricles, cisterns and sulci are prominent consistent with atrophy. There is decreased attenuation within the periventricular white matter consistent with periventricular leukomalacia. ORBITS: The visualized portion of the orbits demonstrate no acute abnormality. SINUSES: The visualized paranasal sinuses and mastoid air cells demonstrate no acute abnormality. SOFT TISSUES/SKULL:  No acute abnormality of the visualized skull or soft tissues. 1.  There is no acute intracranial abnormality. Specifically, there is no intracranial hemorrhage. 2. Atrophy and periventricular leukomalacia, . CT CERVICAL SPINE WO CONTRAST    Result Date: 2/26/2023  EXAMINATION: CT OF THE CERVICAL SPINE WITHOUT CONTRAST 2/26/2023 9:22 am TECHNIQUE: CT of the cervical spine was performed without the administration of intravenous contrast. Multiplanar reformatted images are provided for review. Automated exposure control, iterative reconstruction, and/or weight based adjustment of the mA/kV was utilized to reduce the radiation dose to as low as reasonably achievable. COMPARISON: None. HISTORY: ORDERING SYSTEM PROVIDED HISTORY: fall TECHNOLOGIST PROVIDED HISTORY: Reason for exam:->fall Decision Support Exception - unselect if not a suspected or confirmed emergency medical condition->Emergency Medical Condition (MA) What reading provider will be dictating this exam?->CRC FINDINGS: The ring of C1 is intact as is the dense. There is no compression fracture of the cervical spine. No jumped or perched facet is noted. Multilevel degenerative disc and degenerative joint disease is noted. There are posterior degenerative endplate spur seen at the C5-6 level which causes mild central canal stenosis. The prevertebral soft tissues are unremarkable. The airway is widely patent. Images through the lung apices are negative for a pneumothorax. 1. There is no acute compression fracture or subluxation of the cervical spine.  2. Multilevel degenerative disc and degenerative joint disease. CT THORACIC SPINE WO CONTRAST    Result Date: 2/26/2023  EXAMINATION: CT OF THE THORACIC SPINE WITHOUT CONTRAST  2/26/2023 9:22 am: TECHNIQUE: CT of the thoracic spine was performed without the administration of intravenous contrast. Multiplanar reformatted images are provided for review. Automated exposure control, iterative reconstruction, and/or weight based adjustment of the mA/kV was utilized to reduce the radiation dose to as low as reasonably achievable. COMPARISON: None. HISTORY: ORDERING SYSTEM PROVIDED HISTORY: fall TECHNOLOGIST PROVIDED HISTORY: Reason for exam:->fall What reading provider will be dictating this exam?->CRC FINDINGS: BONES/ALIGNMENT: There is kyphosis of the thoracic spine. No compression fractures noted. Degenerative endplate spurs are seen throughout the course of the thoracic spine. There is no osseous lesion. No jumped or perched facet is noted. Paraspinal soft tissues are unremarkable. DEGENERATIVE CHANGES: Mild multilevel degenerative disc disease is noted. 1. There is no acute compression fracture or osseous abnormality of the thoracic spine 2. Kyphosis of the thoracic spine 3. Mild multilevel degenerative disc disease. CT LUMBAR SPINE WO CONTRAST    Result Date: 2/26/2023  EXAMINATION: CT OF THE LUMBAR SPINE WITHOUT CONTRAST  2/26/2023 TECHNIQUE: CT of the lumbar spine was performed without the administration of intravenous contrast. Multiplanar reformatted images are provided for review. Adjustment of mA and/or kV according to patient size was utilized. Automated exposure control, iterative reconstruction, and/or weight based adjustment of the mA/kV was utilized to reduce the radiation dose to as low as reasonably achievable. COMPARISON: The previous study performed 01/21/2022.  HISTORY: ORDERING SYSTEM PROVIDED HISTORY: fall TECHNOLOGIST PROVIDED HISTORY: Reason for exam:->fall Decision Support Exception - unselect if not a suspected or confirmed emergency medical condition->Emergency Medical Condition (MA) What reading provider will be dictating this exam?->CRC FINDINGS: BONES/ALIGNMENT: There are old, proximal left 11th and 12th rib fractures present, with callus formation about each fracture site. An unfused fracture line is identified at the 11th rib. There is preservation of the normal lordotic curve. There is no evidence of fracture or subluxation involving the lumbar spine. Diffuse osteopenia is again noted. DEGENERATIVE CHANGES: Moderate osteo-degenerative changes are again noted throughout the visualized thoracolumbar spine, with anterior and lateral spondylitic ridging, as well as multilevel bilateral facet hypertrophy and sclerosis. A small right posterolateral disc protrusion is now identified at L3-4, causing impression on the right ventrolateral thecal sac. There is mild narrowing of the right neural foramen. Again seen is a mild, diffuse, posterior disc bulge at L4-5, however, there is now noted to be a small right posterolateral disc protrusion, causing impression on the right ventrolateral thecal sac and mild narrowing of the right neural foramen. The remainder of the intervertebral discs are without significant interval change. The remainder of the visualized thoracolumbar cord and thecal sac are unremarkable. Mild spinal canal stenosis is at L3-4 and L4-5, secondary to the disc protrusions. Identified SOFT TISSUES/RETROPERITONEUM: No paraspinal mass is seen. A small liver cyst is again noted. There is again a suggestion of left renal parapelvic cysts. 1.  No evidence of fracture or subluxation involving the lumbar spine. 2.  Old left 11th and 12th rib fractures. 3.  Diffuse osteopenia again seen, when compared to the previous study performed 01/21/2022. 4.  Osteo-degenerative changes again identified. 5.  Small right posterolateral disc protrusions at L3-4 and L4-5.   The L4-5 disc protrusion is superimposed upon a previously noted disc bulge. 6.  Mild spinal canal stenosis at L3-4 and L4-5. 7.  Other findings, as described above. CT PELVIS WO CONTRAST Additional Contrast? None    Result Date: 2/27/2023  EXAMINATION: CT OF THE PELVIS WITHOUT CONTRAST 2/26/2023 9:40 pm TECHNIQUE: CT of the pelvis was performed without the administration of intravenous contrast.  Multiplanar reformatted images are provided for review. Adjustment of mA and/or kV according to patient size was utilized. Automated exposure control, iterative reconstruction, and/or weight based adjustment of the mA/kV was utilized to reduce the radiation dose to as low as reasonably achievable. COMPARISON: None. HISTORY: ORDERING SYSTEM PROVIDED HISTORY: Left hip bipolar hemiarthroplasty dislocation, (subsequent bipolar head separation during Emergency department reduction) concern for possible posterior wall fracture, need for operative planning TECHNOLOGIST PROVIDED HISTORY: Reason for exam:->Left hip bipolar hemiarthroplasty dislocation, (subsequent bipolar head separation during Emergency department reduction) concern for possible posterior wall fracture, need for operative planning Additional Contrast?->None Release to patient - Note: Delayed release will only apply to this order. Orders that are changed or resulted outside of the EHR will not respect a delayed release to [facility]. ->Delay Immediate Release by 3 Days Reason for preventing immediate release->Patient request What reading provider will be dictating this exam?->CRC FINDINGS: Diffuse osteopenia and osteoporosis. There are bilateral hip prosthesis. There is a left bipolar hip prosthesis present with no evidence of acute fracture through the proximal left femur. Both the superior and inferior pubic rami are intact. Degenerative changes seen within the lower lumbar spine. There is a fracture of approximately S3 which is age indeterminate. Degenerative changes seen within the sacroiliac joints bilaterally. Atherosclerotic disease seen within the abdominal aorta. The uterus is anteverted with a bulbous appearance to suggest small uterine fibroids. There is a large cervical cyst identified at 5.3 cm which can be further characterized with ultrasound if clinically indicated. There is no abnormality within the pelvic portions of the gastrointestinal tract. Stool seen within the colon. No pelvic adenopathy. Vascular calcifications seen throughout the abdominal aorta and iliac vessels. There is a bipolar left hip prosthesis with no evidence of acute fracture through the proximal left femur or pelvis. Incidental findings within the uterus in which further characterization with ultrasound can be performed if clinically indicated. XR CHEST PORTABLE    Result Date: 2/26/2023  EXAMINATION: ONE XRAY VIEW OF THE CHEST 2/26/2023 9:23 am COMPARISON: None. HISTORY: ORDERING SYSTEM PROVIDED HISTORY: fall TECHNOLOGIST PROVIDED HISTORY: Reason for exam:->fall What reading provider will be dictating this exam?->CRC FINDINGS: The lungs are without acute focal process. There is no effusion or pneumothorax. The cardiomediastinal silhouette is without acute process. The osseous structures are without acute process. No acute process. Assessment:    Active Hospital Problems    Diagnosis Date Noted    Dislocation of hip joint prosthesis, initial encounter [facility]) [R32.166O, Z96.649] 02/26/2023     Priority: Medium    Acute pain due to trauma [G89.11] 02/13/2023     Priority: Medium    Fall [W19. XXXA] 02/12/2023     Priority: Medium    Late onset Alzheimer's dementia without behavioral disturbance ([facility] [address] [G30.1, F02.80] 02/21/2022     Patient presented over the weekend with a posterior dislocation after a fall at her dementia care facility. She is status post recent left hip hemiarthroplasty for treatment of femoral neck fracture. Dr. Bridget Chao was on-call this weekend and discussed the case with me over the phone.   I am taking over her care today. I performed the prior left hip hemiarthroplasty. I was not contacted prior to the reduction but with the posterior dislocation it appears emergency department went ahead with attempted reduction of the left hip with a few attempts. During the reduction attempts though the outer ball of the bipolar head dissociated. The inner ball was reduced into the native acetabulum however the outer ball remains in the wrong location. There is very low probability that further closed reduction attempts would be able to reassociate the outer ball with the inner ball. Plan was discussed in person with her power of . Options discussed include doing nothing at all, palliative care, revision surgery. She would like to proceed with revision surgery. My plan is to open the hip and remove the outer ball and reassess the stability, either give her more leg length and offset to hopefully prevent further dislocations. We will use a unipolar device so that in the event she dislocates again this can be more easily reduced to the emergency department without having the risk of intra prosthetic dissociation. I reviewed with her POA that the trade off here is increasing the leg length and possible leg length discrepancy. She is accepting of this. The other possibility is conversion to a total hip replacement where I can modify the anteversion of the acetabulum and place an elevated liner. I have obtained a CT scan of the pelvis which I reviewed and I do not see evidence of a posterior wall fracture though the study is limited due to metal artifact and diffuse osteopenia. I have some concern for a posterior wall fracture based on the x-rays. If intraoperatively there is indeed a posterior wall fracture the plan is to convert to a total hip replacement with multiple screws in the acetabular component.   We again reviewed the other risks of surgery including but not limited to infection, bleeding, anesthesia risks, sciatic nerve palsy, persistent instability, limb length discrepancy, aseptic loosening, osteolysis, DVT, continued pain, iatrogenic fracture, MI, stroke, and death. Her POA elected to proceed. I will speak to her immediately after the surgery or during the case if there are any major changes in the plan.         Dr. Tuyet Goodson  Electronically signed by Deirdre Gao MD on 2/27/2023 at 1:27 PM

## 2023-02-27 NOTE — PROGRESS NOTES
TRAUMA DAILY PROGRESS NOTE      Patient Name: Lola Martinez  Admission Date 2023    Hospital Day: 1  Patient seen and examined on 2023    INTERVAL HISTORY/EVENTS     Background: Lola Martinez is a 80 y.o. female with a PMHx of dementia and recent admission to our service from 23-2/15/23 s/p left hemiarthroplasty with Orthopaedic Surgery (Dr. Stephanie Ronquillo) on 2/12/15 presented to [facility] ED on 2023 s/p unwitnessed fall from bed at [facility]. Trauma workup found left posterior hip dislocation of prosthetic acetabulum s/p reduction of posterior dislocation left hip arthroplasty be ED prior to trauma consult evaluation. Hospital Course:  2023:  Presented to ED s/p unwitnessed fall from bed at [facility]. ED with attempted reduction prior to Trauma Consult. Prosthetic hip with partial reduction. Admitted to trauma. Ortho consulted. NPO at midnight for possible OR with Ortho tomorrow. 24 Hour Events:  Admitted to our trauma service as above. Has been NPO since midnight for possible OR with Ortho today. POA at bedside to discuss treatment options with orthopaedic surgery team.  Awaiting cardiology consult due to recent history of SVT post op. POA at bedside reports that pain seems to be grimacing in pain. Patient with history of dementia and participation in exam limited. Vitals reviewed: Tachycardic. Normotensive. Afebrile. SpO2 >95% on room air. PHYSICAL EXAM     Vitals Average, Min and Max for last 24 hours:  Temp: Temp: 98.4 °F (36.9 °C);  Temp  Av.4 °F (36.9 °C)  Min: 97.8 °F (36.6 °C)  Max: 98.6 °F (37 °C)  Respirations: Resp  Av.5  Min: 15  Max: 27  Pulse: Pulse  Av.3  Min: 92  Max: 119  Blood Pressure: Systolic (27VTP), MNP:587 , Min:113 , PF   ; Diastolic (57YAL), QPF:70, Min:47, Max:114  SpO2: SpO2  Av.5 %  Min: 89 %  Max: 100 %    24hr Intake/Output:   Intake/Output Summary (Last 24 hours) at 2023 0739  Last data filed at 2023 0600  Gross per 24 hour   Intake --   Output 1300 ml   Net -1300 ml       Vitals: /88   Pulse (!) 113   Temp 98.4 °F (36.9 °C) (Oral)   Resp 20   Wt 130 lb (59 kg)   SpO2 98%   BMI 23.78 kg/m²     Physical Exam:  Constitutional: Lying supine in bed. Appears in discomfort 2/2 pain. Facial grimace. HEENT: Atraumatic normocephalic. Cardiovascular: Tachycardic. Pulmonary: Breaths unlabored on room air. Abdominal: Soft. Non-distended. Non-tender. Musculoskeletal: Good ROM in all extremities. No edema. LLE shortened and internally rotated. N/V intact distally. Recent surgical incision, c/d/I and in appropriate phase of healing. Neurological: Alert, awake, and orientated x 3. Motor and sensory grossly intact. No focal deficits. GCS of 15.     LABORATORY RESULTS (LAST 24 HOURS)     CBC with Differential:    Lab Results   Component Value Date/Time    WBC 9.0 02/26/2023 07:15 AM    RBC 3.90 02/26/2023 07:15 AM    HGB 10.8 02/26/2023 07:15 AM    HCT 32.8 02/26/2023 07:15 AM     02/26/2023 07:15 AM    MCV 84.1 02/26/2023 07:15 AM    MCH 27.6 02/26/2023 07:15 AM    MCHC 32.8 02/26/2023 07:15 AM    RDW 16.0 02/26/2023 07:15 AM    LYMPHOPCT 10.6 02/26/2023 07:15 AM    MONOPCT 9.4 02/26/2023 07:15 AM    EOSPCT 0.0 01/13/2021 12:00 AM    BASOPCT 0.5 02/26/2023 07:15 AM    MONOSABS 0.8 02/26/2023 07:15 AM    LYMPHSABS 1.0 02/26/2023 07:15 AM    EOSABS 0.0 02/26/2023 07:15 AM    BASOSABS 0.0 02/26/2023 07:15 AM     CMP:    Lab Results   Component Value Date/Time     02/26/2023 07:15 AM    K 4.0 02/26/2023 07:15 AM    K 3.9 01/22/2022 06:37 AM     02/26/2023 07:15 AM    CO2 25 02/26/2023 07:15 AM    BUN 15 02/26/2023 07:15 AM    CREATININE 0.84 02/26/2023 07:15 AM    GFRAA >60.0 01/22/2022 06:37 AM    LABGLOM >60.0 02/26/2023 07:15 AM    GLUCOSE 111 02/26/2023 07:15 AM    PROT 6.9 02/26/2023 07:15 AM    LABALBU 3.3 02/26/2023 07:15 AM    CALCIUM 9.6 02/26/2023 07:15 AM    BILITOT 0.5 02/26/2023 07:15 AM    ALKPHOS 104 02/26/2023 07:15 AM    AST 26 02/26/2023 07:15 AM    ALT 27 02/26/2023 07:15 AM     Magnesium:  No results found for: MG  PT/INR:    Lab Results   Component Value Date/Time    PROTIME 14.4 02/26/2023 07:15 AM    INR 1.1 02/26/2023 07:15 AM     PTT:    Lab Results   Component Value Date/Time    APTT 34.3 02/26/2023 07:15 AM       IMAGING RESULTS (PERSONALLY REVIEWED)     All admission and follow up imaging reviewed. ASSESSMENT & PLAN     Diagnoses:  [facility]  Posterior dislocation of left hip  Dislocation of hip joint prosthesis    PMHx: dementia and recent admission to our service from 2/11/23-2/15/23 s/p left hemiarthroplasty    Incidental Findings: Not discussed, JLR 2/27/23  There is a large cervical cyst identified at 5.3 cm which can be further characterized with ultrasound if clinically indicated. ASSESSMENT/PLAN:  Neurological: Acute pain due to trauma, acute post-op pain. Hx dementia  - Continue Tylenol 650mg q6hr scheduled  - Continue Oxycodone 2.5/5mg q4hr prn mod/severe pain  - Continue home donepezil 5mg daily  - Continue home seroquel 12.5mg daily     Cardiovascular: Patient did have several episodes of self limiting SVT last admission, family at the time opted to not treat given DNR-CCA status. - Vital signs per unit protocol  - Pre-op EKG ordered  - Continue cardiac telemetry  - Awaiting cardiology consult due to recent history of SVT post op.      Respiratory: Sating appropriately on RA  - Maintain O2 sats > 92%  - Encourage IS 10x hourly     GI/Diet: No acute issues  - OK for Regular diet postop  - NPO at midnight for OR with Ortho today  - Bowel regimen: senokot BID, miralax daily     Renal/Electrolytes: No acute electrolyte abnormalities, BUN/Cr within normal limits on last BMP  - Continue mIVF with LR @100cc/hr  - AM BMP     ID: No leukocytosis, afebrile  - Liliana-op abx only     Heme: Down trend in H&H  - No indication for transfusion  - AM CBC     Endocrine: No acute or chronic concerns      MSK: Left posterior hip dislocation of prosthetic acetabulum s/p reduction of posterior dislocation left hip arthroplasty be ED prior to trauma consult evaluation. s/p unwitnessed fall from bed at [facility] 2/26. s/p left hemiarthroplasty with Orthopaedic Surgery (Dr. Collette Necessary) on 2/12/23.  - Ortho consulted with plans for OR today. - Spines: Clear  - Weight Bearing Restrictions: NWB LLE  - Activity: Ambulatory with assistance  - PT/OT: post-op eval as able     Prophylaxis:   - SCDs and Lovenox 30mg BID for VTE PPx     Lines/Tubes/Drains:  - Maintain PIVs  - No indication for harris catheter, monitor for urinary retention        Dispo: remain on trauma service for OR today with Ortho. Accom Status: General Status       - Follow up with Orthopedics (Dr. Collette Necessary) TBD.  - Follow up with PCP for chronic medical conditions  - No trauma follow up indicated.        Eugene Parra [place]  Trauma/Critical Care  [see Treatment Team Sticky Note for contact information]     This patient's plan of care was discussed and made in collaboration with Trauma Attending physician, Jaime Jacobs MD.

## 2023-02-27 NOTE — FLOWSHEET NOTE
2/26/23 @ 1916 Notified Dr. Austin Fuentes of Ortho consult via [employer]    2/26/23 @ Daija Bassett Notified Dr. Jeremy Jimenez of Cardiology consult via [address] [place] [address]

## 2023-02-27 NOTE — ANESTHESIA POSTPROCEDURE EVALUATION
Department of Anesthesiology  Postprocedure Note    Patient: Matti Tavera  MRN: [id number]  YOB: 1940  Date of evaluation: 2/27/2023      Procedure Summary     Date: 02/27/23 Room / Location: Romayne Shirts OR 10 / [facility]    Anesthesia Start: 1939 Anesthesia Stop: 6209    Procedure: [address] (Left) Diagnosis:       Posterior dislocation of left hip, initial encounter ([facility] [address]      (POSTERIOR DISLOCATION OF LEFT HIP)    Surgeons: Eliel Early MD Responsible Provider: Herberth Esparza MD    Anesthesia Type: regional, spinal, MAC ASA Status: 3          Anesthesia Type: No value filed.     Jeffy Phase I: Jeffy Score: 5    Jeffy Phase II:        Anesthesia Post Evaluation    Patient location during evaluation: bedside  Patient participation: complete - patient participated  Level of consciousness: awake and sleepy but conscious  Pain score: 0  Airway patency: patent  Nausea & Vomiting: no nausea and no vomiting  Complications: no  Cardiovascular status: blood pressure returned to baseline and hemodynamically stable  Respiratory status: acceptable  Hydration status: euvolemic

## 2023-02-27 NOTE — ANESTHESIA PROCEDURE NOTES
Peripheral Block    Patient location during procedure: pre-op  Reason for block: post-op pain management and at surgeon's request  Start time: 2/27/2023 1:07 PM  End time: 2/27/2023 1:12 PM  Staffing  Performed: anesthesiologist   Anesthesiologist: Jenna Billingsley MD  Preanesthetic Checklist  Completed: patient identified, IV checked, site marked, risks and benefits discussed, surgical/procedural consents, equipment checked, pre-op evaluation, timeout performed, anesthesia consent given, oxygen available and monitors applied/VS acknowledged  Peripheral Block   Patient position: supine  Prep: ChloraPrep  Provider prep: mask and sterile gloves (Sterile probe cover)  Patient monitoring: cardiac monitor, continuous pulse ox, frequent blood pressure checks and IV access  Block type: PENG  Laterality: left  Injection technique: single-shot  Guidance: ultrasound guided  Local infiltration: ropivacaine and lidocaine (10 ml of NS added)  Infiltration strength: 0.5 %  Local infiltration: ropivacaine and lidocaine (10 ml of NS added)  Dose: 15 mLDose: 5 mL    Needle   Needle type: combined needle/nerve stimulator   Needle gauge: 21 G  Needle localization: anatomical landmarks and ultrasound guidance  Needle length: 10 cm  Assessment   Injection assessment: negative aspiration for heme, no paresthesia on injection and local visualized surrounding nerve on ultrasound  Paresthesia pain: immediately resolved  Slow fractionated injection: yes  Hemodynamics: stable  Real-time US image taken/store: yes    Additional Notes  Ultrasound image printed and saved in patient chart.     Sterile probe cover used

## 2023-02-27 NOTE — ANESTHESIA PRE PROCEDURE
Department of Anesthesiology  Preprocedure Note       Name:  Ginny Amin   Age:  80 y.o.  :  1940                                          MRN:  [id number]         Date:  2023      Surgeon: Lester Ortiz): Elysia Paul MD    Procedure: Procedure(s):  REVISON LEFT HIP ARTHROPLASTY ROOM [address]    Medications prior to admission:   Prior to Admission medications    Medication Sig Start Date End Date Taking? Authorizing Provider   enoxaparin Sodium (LOVENOX) 30 MG/0.3ML injection Inject 0.3 mLs into the skin 2 times daily for 28 days 2/15/23 3/15/23  Mohan Abdul PA-C   sennosides-docusate sodium (SENOKOT-S) 8.6-50 MG tablet Take 2 tablets by mouth in the morning and at bedtime for 14 days 2/15/23 3/1/23  Mohan Abdul PA-C   acetaminophen (TYLENOL) 325 MG tablet Take 2 tablets by mouth every 4 hours as needed for Pain 22  Mohan Abdul PA-C   Lactobacillus (ACIDOPHILUS) CAPS capsule Take 1 capsule by mouth 2 times daily    Historical Provider, MD   vitamin D3 (CHOLECALCIFEROL) 25 MCG (1000 UT) TABS tablet Take 1,000 Units by mouth daily    Historical Provider, MD   ketoconazole (NIZORAL) 2 % cream Apply topically 2 times daily Apply topically daily L foot    Historical Provider, MD   donepezil (ARICEPT) 5 MG tablet Take 5 mg by mouth daily  21   Historical Provider, MD   QUEtiapine (SEROQUEL) 25 MG tablet Take 0.5 tablets by mouth daily 21   Heidy Delgado MD       Current medications:    No current facility-administered medications for this visit. No current outpatient medications on file.      Facility-Administered Medications Ordered in Other Visits   Medication Dose Route Frequency Provider Last Rate Last Admin    acetaminophen (TYLENOL) tablet 650 mg  650 mg Oral Q4H [facility] JOEL Carver        methocarbamol (ROBAXIN) tablet 750 mg  750 mg Oral 4x Daily [facility] lidocaine 4 % external patch 2 patch  2 patch TransDERmal Daily [place], [place]        oxyCODONE (ROXICODONE) immediate release tablet 5 mg  5 mg Oral Q4H PRN Darrius Burns PA-C        Or    oxyCODONE (ROXICODONE) immediate release tablet 10 mg  10 mg Oral Q4H PRN Darrius Burns PA-C        ketorolac (TORADOL) injection 15 mg  15 mg IntraVENous Q6H [place], [place]        HYDROmorphone (DILAUDID) injection 0.25 mg  0.25 mg IntraVENous Q4H PRN Darrius Burns PA-C        donepezil (ARICEPT) tablet 5 mg  5 mg Oral Daily Darrius Burns PA-C   5 mg at 02/27/23 1049    enoxaparin Sodium (LOVENOX) injection 30 mg  30 mg SubCUTAneous BID [facility]        QUEtiapine (SEROQUEL) tablet 12.5 mg  12.5 mg Oral Nightly Darrius Burns PA-C   12.5 mg at 02/26/23 2127    Vitamin D (CHOLECALCIFEROL) tablet 1,000 Units  1,000 Units Oral Daily Darrius Burns PA-C   1,000 Units at 02/27/23 1049    sodium chloride flush 0.9 % injection 10 mL  10 mL IntraVENous 2 times per day Darrius Burns PA-C   10 mL at 02/27/23 1228    sodium chloride flush 0.9 % injection 10 mL  10 mL IntraVENous PRN Darrius Burns PA-C        0.9 % sodium chloride infusion   IntraVENous PRN Darrius Burns PA-C        ondansetron (ZOFRAN-ODT) disintegrating tablet 4 mg  4 mg Oral Q8H PRN Darrius Burns PA-C        Or    ondansetron [facility]) injection 4 mg  4 mg IntraVENous Q6H PRN Darrius Burns PA-C        sennosides-docusate sodium (SENOKOT-S) 8.6-50 MG tablet 1 tablet  1 tablet Oral BID Darrius Burns PA-C   1 tablet at 02/26/23 2127    magnesium hydroxide (MILK OF MAGNESIA) 400 MG/5ML suspension 30 mL  30 mL Oral Daily PRN Darrius Savory, PA-C        bisacodyl (DULCOLAX) suppository 10 mg  10 mg Rectal Daily PRN Soledad Valdez PA-C   10 mg at 02/27/23 1049    lactated ringers IV soln infusion   IntraVENous Continuous Nathan Barber 100 mL/hr at 02/27/23 1048 New Bag at 02/27/23 1048       Allergies: Allergies   Allergen Reactions    Bactrim [Sulfamethoxazole-Trimethoprim]     Bee Venom     Ceftin [Cefuroxime]     Wasp Venom        Problem List:    Patient Active Problem List   Diagnosis Code    Closed fracture of sacrum, initial encounter ([facility] [address] S32.10XA    [facility] S[facility]    History of COVID-19 Z86.16    Late onset Alzheimer's dementia without behavioral disturbance (HCC) G30.1, F02.80    Vitamin D deficiency E55.9    Osteopenia of left thigh M85.852    Fall W19. [place]    Acute pain due to trauma G89.11    Acute post-operative pain G89.18    Dislocation of hip joint prosthesis, initial encounter ([facility] T84.029A, Z96.649       Past Medical History:        Diagnosis Date    Asymptomatic varicose veins of both lower extremities     Dementia with behavioral disturbance     Elevated blood pressure reading     Full dentures     History of hip fracture 2012    Small vessel disease, cerebrovascular        Past Surgical History:        Procedure Laterality Date    HIP FRACTURE SURGERY  2012    HIP SURGERY Left 2/12/2023    LEFT HIP HEMIARTHROPLASTY, PROPHYLACTIC STABILIZATION OF LEFT FEMUR performed by Mateo Hennessy MD at [place] [address] History:    Social History     Tobacco Use    Smoking status: Unknown    Smokeless tobacco: Never   Substance Use Topics    Alcohol use: Not on file                                Counseling given: Not Answered      Vital Signs (Current): There were no vitals filed for this visit.                                            BP Readings from Last 3 Encounters:   02/27/23 (!) 124/57   02/14/23 (!) 115/48   12/26/22 138/60       NPO Status: BMI:   Wt Readings from Last 3 Encounters:   02/26/23 130 lb (59 kg)   02/11/23 130 lb (59 kg)   12/24/22 130 lb (59 kg)     There is no height or weight on file to calculate BMI.    CBC:   Lab Results   Component Value Date/Time    WBC 9.0 02/26/2023 07:15 AM    RBC 3.90 02/26/2023 07:15 AM    HGB 10.8 02/26/2023 07:15 AM    HCT 32.8 02/26/2023 07:15 AM    MCV 84.1 02/26/2023 07:15 AM    RDW 16.0 02/26/2023 07:15 AM     02/26/2023 07:15 AM       CMP:   Lab Results   Component Value Date/Time     02/26/2023 07:15 AM    K 4.0 02/26/2023 07:15 AM    K 3.9 01/22/2022 06:37 AM     02/26/2023 07:15 AM    CO2 25 02/26/2023 07:15 AM    BUN 15 02/26/2023 07:15 AM    CREATININE 0.84 02/26/2023 07:15 AM    GFRAA >60.0 01/22/2022 06:37 AM    LABGLOM >60.0 02/26/2023 07:15 AM    GLUCOSE 111 02/26/2023 07:15 AM    PROT 6.9 02/26/2023 07:15 AM    CALCIUM 9.6 02/26/2023 07:15 AM    BILITOT 0.5 02/26/2023 07:15 AM    ALKPHOS 104 02/26/2023 07:15 AM    AST 26 02/26/2023 07:15 AM    ALT 27 02/26/2023 07:15 AM       POC Tests: No results for input(s): POCGLU, POCNA, POCK, POCCL, POCBUN, POCHEMO, POCHCT in the last 72 hours.     Coags:   Lab Results   Component Value Date/Time    PROTIME 14.4 02/26/2023 07:15 AM    INR 1.1 02/26/2023 07:15 AM    APTT 34.3 02/26/2023 07:15 AM       HCG (If Applicable): No results found for: PREGTESTUR, PREGSERUM, HCG, HCGQUANT     ABGs: No results found for: PHART, PO2ART, AJP6TIF, FEX4SBA, BEART, Q1RJXNYC     Type & Screen (If Applicable):  No results found for: LABABO, LABRH    Drug/Infectious Status (If Applicable):  No results found for: HIV, HEPCAB    COVID-19 Screening (If Applicable):   Lab Results   Component Value Date/Time    COVID19 Not Detected 02/15/2023 12:03 PM           Anesthesia Evaluation    Airway: Mallampati: II  TM distance: >3 FB   Neck ROM: full  Mouth opening: > = 3 FB   Dental:    (+) edentulous Pulmonary:Negative Pulmonary ROS breath sounds clear to auscultation                             Cardiovascular:Negative CV ROS          ECG reviewed  Rhythm: regular                      Neuro/Psych:   (+) psychiatric history:            GI/Hepatic/Renal: Neg GI/Hepatic/Renal ROS            Endo/Other: Negative Endo/Other ROS                    Abdominal:             Vascular: negative vascular ROS. Other Findings:             Anesthesia Plan      regional, spinal and MAC     ASA 3     (US guided PENG block  GA backup)  Induction: intravenous. MIPS: Prophylactic antiemetics administered. Anesthetic plan and risks discussed with healthcare power of  and patient. Use of blood products discussed with healthcare power of  and patient whom consented to blood products.    Plan discussed with surgical team.    Attending anesthesiologist reviewed and agrees with Preprocedure content      Post-op pain plan if not by surgeon: single peripheral nerve block            Reyna Pond MD   2/27/2023

## 2023-02-27 NOTE — PROGRESS NOTES
X ray at bedside again new orders writtern.  Dr Eli Mcneal  put in new orders he is also speaking  with X ray

## 2023-02-28 PROBLEM — S73.015A POSTERIOR DISLOCATION OF LEFT HIP (HCC): Status: ACTIVE | Noted: 2023-02-28

## 2023-02-28 LAB
ANION GAP SERPL CALCULATED.3IONS-SCNC: 11 MEQ/L (ref 9–15)
BASOPHILS ABSOLUTE: 0 K/UL (ref 0–0.2)
BASOPHILS RELATIVE PERCENT: 0.5 %
BUN BLDV-MCNC: 6 MG/DL (ref 8–23)
CALCIUM SERPL-MCNC: 8.2 MG/DL (ref 8.5–9.9)
CHLORIDE BLD-SCNC: 106 MEQ/L (ref 95–107)
CO2: 23 MEQ/L (ref 20–31)
CREAT SERPL-MCNC: 0.61 MG/DL (ref 0.5–0.9)
EOSINOPHILS ABSOLUTE: 0.1 K/UL (ref 0–0.7)
EOSINOPHILS RELATIVE PERCENT: 1 %
GFR SERPL CREATININE-BSD FRML MDRD: >60 ML/MIN/{1.73_M2}
GLUCOSE BLD-MCNC: 83 MG/DL (ref 70–99)
HCT VFR BLD CALC: 23.7 % (ref 37–47)
HCT VFR BLD CALC: 26.1 % (ref 37–47)
HEMOGLOBIN: 7.8 G/DL (ref 12–16)
HEMOGLOBIN: 8.4 G/DL (ref 12–16)
LYMPHOCYTES ABSOLUTE: 1.3 K/UL (ref 1–4.8)
LYMPHOCYTES RELATIVE PERCENT: 16.4 %
MCH RBC QN AUTO: 26.6 PG (ref 27–31.3)
MCHC RBC AUTO-ENTMCNC: 32.3 % (ref 33–37)
MCV RBC AUTO: 82.3 FL (ref 79.4–94.8)
MONOCYTES ABSOLUTE: 1.1 K/UL (ref 0.2–0.8)
MONOCYTES RELATIVE PERCENT: 13.7 %
NEUTROPHILS ABSOLUTE: 5.5 K/UL (ref 1.4–6.5)
NEUTROPHILS RELATIVE PERCENT: 68.4 %
PDW BLD-RTO: 15.8 % (ref 11.5–14.5)
PLATELET # BLD: 433 K/UL (ref 130–400)
POTASSIUM REFLEX MAGNESIUM: 4 MEQ/L (ref 3.4–4.9)
RBC # BLD: 3.17 M/UL (ref 4.2–5.4)
SODIUM BLD-SCNC: 140 MEQ/L (ref 135–144)
WBC # BLD: 8 K/UL (ref 4.8–10.8)

## 2023-02-28 PROCEDURE — 2580000003 HC RX 258: Performed by: PHYSICIAN ASSISTANT

## 2023-02-28 PROCEDURE — 99024 POSTOP FOLLOW-UP VISIT: CPT | Performed by: ORTHOPAEDIC SURGERY

## 2023-02-28 PROCEDURE — 99213 OFFICE O/P EST LOW 20 MIN: CPT

## 2023-02-28 PROCEDURE — 94640 AIRWAY INHALATION TREATMENT: CPT

## 2023-02-28 PROCEDURE — 97166 OT EVAL MOD COMPLEX 45 MIN: CPT

## 2023-02-28 PROCEDURE — 85025 COMPLETE CBC W/AUTO DIFF WBC: CPT

## 2023-02-28 PROCEDURE — 6360000002 HC RX W HCPCS: Performed by: PHYSICIAN ASSISTANT

## 2023-02-28 PROCEDURE — 97162 PT EVAL MOD COMPLEX 30 MIN: CPT

## 2023-02-28 PROCEDURE — 94150 VITAL CAPACITY TEST: CPT

## 2023-02-28 PROCEDURE — 85014 HEMATOCRIT: CPT

## 2023-02-28 PROCEDURE — 6370000000 HC RX 637 (ALT 250 FOR IP): Performed by: PHYSICIAN ASSISTANT

## 2023-02-28 PROCEDURE — 36415 COLL VENOUS BLD VENIPUNCTURE: CPT

## 2023-02-28 PROCEDURE — 85018 HEMOGLOBIN: CPT

## 2023-02-28 PROCEDURE — 6370000000 HC RX 637 (ALT 250 FOR IP): Performed by: ORTHOPAEDIC SURGERY

## 2023-02-28 PROCEDURE — 93005 ELECTROCARDIOGRAM TRACING: CPT

## 2023-02-28 PROCEDURE — 80048 BASIC METABOLIC PNL TOTAL CA: CPT

## 2023-02-28 PROCEDURE — 99222 1ST HOSP IP/OBS MODERATE 55: CPT | Performed by: INTERNAL MEDICINE

## 2023-02-28 PROCEDURE — 2500000003 HC RX 250 WO HCPCS: Performed by: STUDENT IN AN ORGANIZED HEALTH CARE EDUCATION/TRAINING PROGRAM

## 2023-02-28 PROCEDURE — 1210000000 HC MED SURG R&B

## 2023-02-28 RX ORDER — ASCORBIC ACID 500 MG
500 TABLET ORAL DAILY
Status: DISCONTINUED | OUTPATIENT
Start: 2023-02-28 | End: 2023-03-02 | Stop reason: HOSPADM

## 2023-02-28 RX ORDER — PANTOPRAZOLE SODIUM 20 MG/1
20 TABLET, DELAYED RELEASE ORAL
Status: DISCONTINUED | OUTPATIENT
Start: 2023-02-28 | End: 2023-03-02 | Stop reason: HOSPADM

## 2023-02-28 RX ORDER — IRON POLYSACCHARIDE COMPLEX 150 MG
150 CAPSULE ORAL DAILY
Status: DISCONTINUED | OUTPATIENT
Start: 2023-02-28 | End: 2023-03-02 | Stop reason: HOSPADM

## 2023-02-28 RX ADMIN — KETOROLAC TROMETHAMINE 15 MG: 15 INJECTION, SOLUTION INTRAMUSCULAR; INTRAVENOUS at 05:57

## 2023-02-28 RX ADMIN — SENNOSIDES AND DOCUSATE SODIUM 1 TABLET: 50; 8.6 TABLET ORAL at 12:06

## 2023-02-28 RX ADMIN — ENOXAPARIN SODIUM 30 MG: 100 INJECTION SUBCUTANEOUS at 20:07

## 2023-02-28 RX ADMIN — PANTOPRAZOLE SODIUM 20 MG: 20 TABLET, DELAYED RELEASE ORAL at 12:07

## 2023-02-28 RX ADMIN — CLINDAMYCIN PHOSPHATE 900 MG: 900 INJECTION, SOLUTION INTRAVENOUS at 05:31

## 2023-02-28 RX ADMIN — METHOCARBAMOL 750 MG: 750 TABLET ORAL at 20:07

## 2023-02-28 RX ADMIN — METHOCARBAMOL 750 MG: 750 TABLET ORAL at 17:40

## 2023-02-28 RX ADMIN — ENOXAPARIN SODIUM 30 MG: 100 INJECTION SUBCUTANEOUS at 12:02

## 2023-02-28 RX ADMIN — Medication 150 MG: at 12:06

## 2023-02-28 RX ADMIN — METHOCARBAMOL 750 MG: 750 TABLET ORAL at 12:07

## 2023-02-28 RX ADMIN — Medication 1000 UNITS: at 10:50

## 2023-02-28 RX ADMIN — QUETIAPINE FUMARATE 12.5 MG: 25 TABLET ORAL at 20:07

## 2023-02-28 RX ADMIN — OXYCODONE HYDROCHLORIDE 5 MG: 5 TABLET ORAL at 05:58

## 2023-02-28 RX ADMIN — ACETAMINOPHEN 650 MG: 325 TABLET ORAL at 12:07

## 2023-02-28 RX ADMIN — SENNOSIDES AND DOCUSATE SODIUM 1 TABLET: 50; 8.6 TABLET ORAL at 20:07

## 2023-02-28 RX ADMIN — ACETAMINOPHEN 650 MG: 325 TABLET ORAL at 20:06

## 2023-02-28 RX ADMIN — Medication 10 ML: at 23:26

## 2023-02-28 RX ADMIN — DONEPEZIL HYDROCHLORIDE 5 MG: 5 TABLET, FILM COATED ORAL at 12:07

## 2023-02-28 RX ADMIN — OXYCODONE HYDROCHLORIDE AND ACETAMINOPHEN 500 MG: 500 TABLET ORAL at 12:06

## 2023-02-28 ASSESSMENT — PAIN DESCRIPTION - ORIENTATION: ORIENTATION: LEFT

## 2023-02-28 ASSESSMENT — PAIN DESCRIPTION - LOCATION: LOCATION: HIP

## 2023-02-28 ASSESSMENT — ENCOUNTER SYMPTOMS
GASTROINTESTINAL NEGATIVE: 1
SHORTNESS OF BREATH: 0
ABDOMINAL PAIN: 0
ALLERGIC/IMMUNOLOGIC NEGATIVE: 1
EYES NEGATIVE: 1
NAUSEA: 0
VOMITING: 0
WHEEZING: 0

## 2023-02-28 NOTE — PROGRESS NOTES
Wound Ostomy Continence Nurse  Consult Note       NAME:  [address] [place] [address] Rd., [address] RECORD NUMBER:  [id number]  AGE: 80 y.o. GENDER: female  : 1940  TODAY'S DATE:  2023    Subjective   Reason for [address] Nurse Evaluation and Assessment: left heel pressure injury, present on admission      Trish Avila is a 80 y.o. female referred by:   [] Physician  [x] Nursing  [] Other:     Wound Identification:  Wound Type: pressure  Contributing Factors: chronic pressure, decreased mobility, shear force, malnutrition, and Recent hip fracture    Wound History: Spoke with patient's family member at bedside, who states patient's heel wound was discovered upon arrival to the ED this admission. Patient's family member has a photo taken at that time on her personal device.   Current Wound Care Treatment:  Recommending 1) Continue pressure injury prevention interventions, including low air-loss mattress and border foam dressings for prevention 2) protective barrier cream for incontinence care 3) MWF dressing change to the left heel consisting of Calcium Alginate Ag to wound bed and cover with border heel foam dressing     Patient Goal of Care:  [x] Wound Healing  [] Odor Control  [] Palliative Care  [] Pain Control   [] Other:         PAST MEDICAL HISTORY        Diagnosis Date    Asymptomatic varicose veins of both lower extremities     Dementia with behavioral disturbance     Elevated blood pressure reading     Full dentures     History of hip fracture     Small vessel disease, cerebrovascular        PAST SURGICAL HISTORY    Past Surgical History:   Procedure Laterality Date    HIP FRACTURE SURGERY  2012    HIP SURGERY Left 2023    LEFT HIP HEMIARTHROPLASTY, PROPHYLACTIC STABILIZATION OF LEFT FEMUR performed by Genny Brunson MD at [address] [place] [address] 2023    REVISON LEFT HIP ARTHROPLASTY TO TOTAL HIP ARTHROPLASTY performed by Genny Brunson MD at [address] [place] [address] HISTORY    Family History   Family history unknown: Yes       SOCIAL HISTORY    Social History     Tobacco Use    Smoking status: Unknown    Smokeless tobacco: Never   Vaping Use    Vaping Use: Never used       ALLERGIES    Allergies   Allergen Reactions    Bactrim [Sulfamethoxazole-Trimethoprim]     Bee Venom     Ceftin [Cefuroxime]     Wasp Venom        MEDICATIONS    No current facility-administered medications on file prior to encounter.      Current Outpatient Medications on File Prior to Encounter   Medication Sig Dispense Refill    enoxaparin Sodium (LOVENOX) 30 MG/0.3ML injection Inject 0.3 mLs into the skin 2 times daily for 28 days 16.8 mL 0    sennosides-docusate sodium (SENOKOT-S) 8.6-50 MG tablet Take 2 tablets by mouth in the morning and at bedtime for 14 days 56 tablet 0    acetaminophen (TYLENOL) 325 MG tablet Take 2 tablets by mouth every 4 hours as needed for Pain 120 tablet 0    Lactobacillus (ACIDOPHILUS) CAPS capsule Take 1 capsule by mouth 2 times daily      vitamin D3 (CHOLECALCIFEROL) 25 MCG (1000 UT) TABS tablet Take 1,000 Units by mouth daily      ketoconazole (NIZORAL) 2 % cream Apply topically 2 times daily Apply topically daily L foot      donepezil (ARICEPT) 5 MG tablet Take 5 mg by mouth daily       QUEtiapine (SEROQUEL) 25 MG tablet Take 0.5 tablets by mouth daily 30 tablet 0       Objective    BP (!) 123/48   Pulse (!) 108   Temp 97.7 °F (36.5 °C) (Oral)   Resp 16   Wt 136 lb 11.2 oz (62 kg)   SpO2 100%   BMI 25.00 kg/m²     LABS:  WBC:    Lab Results   Component Value Date/Time    WBC 8.0 02/28/2023 04:53 AM     H/H:    Lab Results   Component Value Date/Time    HGB 8.4 02/28/2023 04:53 AM    HCT 26.1 02/28/2023 04:53 AM     PTT:    Lab Results   Component Value Date/Time    APTT 34.3 02/26/2023 07:15 AM   [APTT}  PT/INR:    Lab Results   Component Value Date/Time    PROTIME 14.4 02/26/2023 07:15 AM    INR 1.1 02/26/2023 07:15 AM     HgBA1c:  No results found for: LABA1C    Assessment   Marcos Risk Score: Marcos Scale Score: 7    Patient Active Problem List   Diagnosis    Closed fracture of sacrum, initial encounter ([facility]    Lumbar transverse process fracture, closed, initial encounter ([facility]    History of COVID-19    Late onset Alzheimer's dementia without behavioral disturbance ([facility]    Vitamin D deficiency    Osteopenia of left thigh    Fall    Acute pain due to trauma    Acute postoperative pain    Acute postoperative anemia due to expected blood loss    Dislocation of hip joint prosthesis, initial encounter ([facility]    Closed displaced fracture of posterior wall of left acetabulum ([facility]       Measurements:  Wound 02/26/23 Heel Left (Active)   Wound Image   02/28/23 0845   Wound Etiology Pressure Stage 3 02/28/23 0845   Dressing Status New dressing applied 02/28/23 0845   Wound Cleansed Cleansed with saline 02/28/23 0845   Dressing/Treatment Alginate with Ag; Foam 02/28/23 0845   Dressing Change Due 03/03/23 02/28/23 0845   Wound Length (cm) 2.5 cm 02/28/23 0845   Wound Width (cm) 2 cm 02/28/23 0845   Wound Depth (cm) 0.2 cm 02/28/23 0845   Wound Surface Area (cm^2) 5 cm^2 02/28/23 0845   Wound Volume (cm^3) 1 cm^3 02/28/23 0845   Wound Assessment Granulation tissue;Eschar less than 20% 02/28/23 0845   Drainage Amount Scant 02/28/23 0845   Drainage Description Serous 02/28/23 0845   Odor None 02/28/23 0845   Liliana-wound Assessment Intact 02/28/23 0845   Margins Defined edges 02/28/23 0845   Number of days: 2     Incision 02/12/23 Hip Left;Posterior (Active)   Dressing Status Dry; Intact; Clean 02/27/23 1050   Drainage Amount None 02/15/23 0911   Number of days: 16       Incision 02/27/23 Thigh Anterior; Left (Active)   Dressing Status Clean;Dry; Intact 02/27/23 2025   Drainage Amount None 02/27/23 2025   Number of days: 1     Assessment:    Left heel with a shallow stage 3 pressure injury - wound bed was likely an unstageable at one time, patient's family member stated \"it was all black,\" and some of the eschar was peeled off with removal of the current dressing and exposed a clean pink wound bed. Wound has shallow depth and appears to be healing well with pink granular tissue in the wound bed and only a small amount of dry eschar to the edge of the wound that is continuing to flake off. Wound cleansed with saline, dried with 4x4s. Small piece of calcium alginate ag applied to the wound bed and border heel foam dressing applied next. Border heel dressing applied to the right heel for prevention at this time as well    Plan   Plan of Care: Wound 02/26/23 Heel Left-Dressing/Treatment: Alginate with Ag, Foam    Specialty Bed Required : Yes   [x] Low Air Loss   [] Pressure Redistribution  [] Fluid Immersion  [] Bariatric  [] Other:     Current Diet: ADULT DIET;  Full Liquid  Dietician consult:  Yes    Discharge Plan:  Placement for patient upon discharge: skilled nursing    Patient appropriate for Outpatient [address]: N/A    Referrals:  []   [] [facility]  [] Supplies  [] Other    Patient/Caregiver Teaching:  Level of patient/caregiver understanding able to:   [] Indicates understanding       [] Needs reinforcement  [] Unsuccessful      [] Verbal Understanding  [] Demonstrated understanding       [] No evidence of learning  [] Refused teaching         [x] N/A       Electronically signed by KIERAN GriffinN, RN, CWOCN on 2/28/2023 at 12:42 PM

## 2023-02-28 NOTE — CARE COORDINATION
LSW spoke with the pt and her niece at bedside and niece has decided that it is time to look at a SNF to provide appropriate  level of care. Niece chose [facility] choice and [facility]. Referral called to [facility]. Precert needed.

## 2023-02-28 NOTE — PROGRESS NOTES
Physical Therapy Med Surg Initial Assessment  [facility]       NAME: Misa Little  : 1940 (89 y.o.)  MRN: [id number]  CODE STATUS: DNR-CCA    Date of Service: 2023    Patient Diagnosis(es): Frequent falls [R29.6]  Posterior dislocation of left hip, initial encounter [facility]) [S73.015A]  Dislocation of hip joint prosthesis, initial encounter [facility]) [Q21.609P, [address]   Chief Complaint   Patient presents with    Fall     Patient Active Problem List    Diagnosis Date Noted    Closed displaced fracture of posterior wall of left acetabulum ([facility] 2023    Dislocation of hip joint prosthesis, initial encounter ([facility] 2023    Acute pain due to trauma 2023    Acute postoperative pain 2023    Acute postoperative anemia due to expected blood loss 2023    Osteopenia of left thigh 2023    Fall 2023    Vitamin D deficiency 2022    History of COVID-19 2022    Late onset Alzheimer's dementia without behavioral disturbance ([facility] 2022    Lumbar transverse process fracture, closed, initial encounter ([facility]     Closed fracture of sacrum, initial encounter ([facility] 2022        Past Medical History:   Diagnosis Date    Asymptomatic varicose veins of both lower extremities     Dementia with behavioral disturbance     Elevated blood pressure reading     Full dentures     History of hip fracture     Small vessel disease, cerebrovascular      Past Surgical History:   Procedure Laterality Date    HIP FRACTURE SURGERY  2012    HIP SURGERY Left 2023    LEFT HIP HEMIARTHROPLASTY, PROPHYLACTIC STABILIZATION OF LEFT FEMUR performed by Priyanka Salazar MD at [address] [place] [address] 2023    REVISON LEFT HIP ARTHROPLASTY TO TOTAL HIP ARTHROPLASTY performed by Priyanka Salazar MD at [facility]       Chart Reviewed: Yes  Patient assessed for rehabilitation services?: Yes  Family / Caregiver Present: Yes (POA/niece)  Diagnosis: Dislocation of hip joint prosthesis, initial encounter ([facility] s/p revision 2/27/23  General Comment  Comments: Pt resting in bed - agreeable to PT evaluation    Restrictions:  Restrictions/Precautions: Weight Bearing, Up as Tolerated, Fall Risk  Lower Extremity Weight Bearing Restrictions  Left Lower Extremity Weight Bearing: Toe Touch Weight Bearing  Position Activity Restriction  Hip Precautions: Posterior hip precautions     SUBJECTIVE:   Subjective: \"I don't like anybody today. \"    Pain  Pain: Pt reports L hip pain, however unable to formally rate. Varies 3-7/10 faces during session. RN notified. Repositioned for comfort. Prior Level of Function:  Social/Functional History  Lives With: Home care staff  Type of Home: Assisted living ([facility])  Home Layout: One level  Home Access: Level entry  Bathroom Shower/Tub: Walk-in shower  Has the patient had two or more falls in the past year or any fall with injury in the past year?: Yes  ADL Assistance: Needs assistance  Ambulation Assistance: Needs assistance (Pt was walking with no device prior to christmas, then fell a few times, has been in wc since sx 2 weeks ago with hip fx)  Transfer Assistance: Needs assistance (Since hip fx)  Active : No  Patient's  Info: provided by family or facility  Additional Comments: Information per niece, pt confused    OBJECTIVE:   Vision  Vision: Within Functional Limits  Hearing: Within functional limits    Cognition:  Overall Orientation Status: Impaired  Orientation Level: Disoriented X4  Follows Commands: Impaired  Overall Cognitive Status: Exceptions  Cognition Comment: Poor sequencing and safety    Observation/Palpation  Observation: No acute distress noted. Pt significantly confused. Requires frequent redirection. ROM:  RLE PROM: WFL  LLE PROM: WFL  LLE General PROM: painful throughout all arcs of motion    Strength:  Strength Other  Other: Unable to follow for formal MMT.  Pt demonstrates 2/5 resistive strength during functional mobility. Neuro:  Balance  Sitting - Static: Fair;Poor  Sitting - Dynamic: Poor                      Tone: Normal    Sensation: Intact    Bed mobility  Supine to Sit: 2 Person assistance;Dependent/Total  Sit to Supine: 2 Person assistance;Dependent/Total  Bed Mobility Comments: Unable to follow effectively for follow through. Transfers  Comment: NT - safety concerns    Ambulation  Comments: Deferred                   Activity Tolerance  Activity Tolerance: Treatment limited secondary to decreased cognition;Treatment limited secondary to medical complications    Patient Education  Education Given To: Patient; Family  Education Provided: Role of Therapy;Plan of Care  Education Method: Verbal  Barriers to Learning: Cognition  Education Outcome: Verbalized understanding;Continued education needed       ASSESSMENT:   Body Structures, Functions, Activity Limitations Requiring Skilled Therapeutic Intervention: Decreased functional mobility ; Decreased ADL status; Decreased safe awareness;Decreased endurance;Decreased strength;Decreased balance;Decreased coordination; Increased pain;Decreased ROM  Decision Making: High Complexity  History: High  Exam: Med  Clinical Presentation: High    Therapy Prognosis: Good;Fair  Barriers to Learning: none         DISCHARGE RECOMMENDATIONS:  Discharge Recommendations: Continue to assess pending progress, Patient would benefit from continued therapy after discharge    Assessment: Continued PT indicated to progress mobility and facilitate DC at highest level of indep and safety.   Requires PT Follow-Up: Yes       PLAN OF CARE:  Physcial Therapy Plan  General Plan:  (1-2 visits daily as tolerated)  Current Treatment Recommendations: Strengthening, Balance training, Functional mobility training, Transfer training, Gait training, Stair training, ADL/Self-care training, Patient/Caregiver education & training, Safety education & training, Home exercise program, Equipment evaluation, education, & procurement, Neuromuscular re-education, Endurance training, Modalities    Safety Devices  Type of Devices: All fall risk precautions in place    Goals:  Long Term Goals  Long Term Goal 1: Pt to complete rolling L<>R in supine with Zina  Long Term Goal 2: Pt to complete sit<>supine with Zina  Long Term Goal 3: Pt to tolerate sitting EOB maintaining midline unsupported wiht SBA  Long Term Goal 4: Pt to complete HEP with CGA    AMPAC (6 CLICK) BASIC MOBILITY  AM-PAC Inpatient Mobility Raw Score : 6     Therapy Time:   Individual   Time In 0942   Time Out 1001   Minutes [address] [place] [address], 02/28/23 at 1:02 PM         Definitions for assistance levels  Independent = pt does not require any physical supervision or assistance from another person for activity completion. Device may be needed.   Stand by assistance = pt requires verbal cues or instructions from another person, close to but not touching, to perform the activity  Minimal assistance= pt performs 75% or more of the activity; assistance is required to complete the activity  Moderate assistance= pt performs 50% of the activity; assistance is required to complete the activity  Maximal assistance = pt performs 25% of the activity; assistance is required to complete the activity  Dependent = pt requires total physical assistance to accomplish the task

## 2023-02-28 NOTE — PROGRESS NOTES
Department of Orthopedic Surgery  Attending Progress Note      Principal diagnosis: Hip dislocation and posterior wall fracture of acetabulum following recent left hip hemiarthroplasty    Secondary diagnoses: Dementia, acute postoperative anemia due to surgical blood loss, acute postoperative pain    Assessment/plan:  -POD #1 conversion to left total hip replacement for management of dislocation following previous hip hemiarthroplasty, patient also with intraoperative evidence of posterior wall fracture involving acetabulum: Patient with evidence of acetabular cup protrusion through medial wall, likely representing contained breach through central portion of quadrilateral plate. Mobilize as able with physical and occupational therapy (patient has limited baseline functional capacity), touchdown (foot flat) weightbearing on left lower extremity, emphasis on assisted transfers, walker for assist, posterior hip precautions, no pivoting. Aquacel dressing with supplemental Xeroform to remain in place. We will plan to maintain protected weightbearing in some capacity for 10 to 12 weeks. -VTE prophylaxis: Enoxaparin 30 mg subcutaneous injection twice daily. Continue SCDs and early ambulation. -GI prophylaxis: Proton pump inhibitor, stool softener.  -Acute postoperative anemia due to surgical blood loss: Hemoglobin 8.4, hemodynamically stable, patient unable to express fatigue. Oral iron and ascorbic acid supplementation. Further management as per Trauma service.  -Pain control: Continue acetaminophen/oxycodone as per Trauma service, also muscle relaxant. Will discontinue Toradol as this is contraindicated in the setting of enoxaparin therapy for DVT prophylaxis.  -Disposition: Plan for discharge to skilled nursing facility when medically stable. We will continue to follow with you. SUBJECTIVE    Surgical findings discussed with Dr. Asha Jackson yesterday.   Patient is minimally verbal in the setting of longstanding dementia. Unable to verbalize chest pain, nausea, or dyspnea. OBJECTIVE    Physical    VITALS:  BP (!) 123/48   Pulse (!) 108   Temp 97.7 °F (36.5 °C) (Oral)   Resp 16   Wt 136 lb 11.2 oz (62 kg)   SpO2 100%   BMI 25.00 kg/m²   Alert, cooperative with examination. Examination of the left lower extremity reveals occlusive dressing in place with underlying Mepilex and overlying Xeroform. Left thigh minimally swollen and supple. Able to initiate plantarflexion and dorsiflexion as well as EHL motion with left foot but unable to formally assess strength due to cognitive limitations. Unable to accurately assess sensory examination. DP and popliteal pulses are 2+ with capillary refill less than 2 seconds. No expressed discomfort with Homans test.    Data    CBC:   Lab Results   Component Value Date/Time    WBC 8.0 02/28/2023 04:53 AM    RBC 3.17 02/28/2023 04:53 AM    HGB 8.4 02/28/2023 04:53 AM    HCT 26.1 02/28/2023 04:53 AM    MCV 82.3 02/28/2023 04:53 AM    MCH 26.6 02/28/2023 04:53 AM    MCHC 32.3 02/28/2023 04:53 AM    RDW 15.8 02/28/2023 04:53 AM     02/28/2023 04:53 AM    MPV 11.9 01/13/2021 12:00 AM     BMP:    Lab Results   Component Value Date/Time     02/28/2023 04:53 AM    K 4.0 02/28/2023 04:53 AM     02/28/2023 04:53 AM    CO2 23 02/28/2023 04:53 AM    BUN 6 02/28/2023 04:53 AM    LABALBU 3.3 02/26/2023 07:15 AM    CREATININE 0.61 02/28/2023 04:53 AM    CALCIUM 8.2 02/28/2023 04:53 AM    GFRAA >60.0 01/22/2022 06:37 AM    LABGLOM >60.0 02/28/2023 04:53 AM    GLUCOSE 83 02/28/2023 04:53 AM     Intraoperative and postoperative radiographs of been reviewed. Postoperative radiographs demonstrate loss of fixation of inferior acetabular screw with increased medial excursion of acetabular shell in the setting of probable central acetabular defect through quadrilateral plate. Cemented femoral stem remains intact. No fracture or dislocation.     Current Inpatient Medications Current Facility-Administered Medications: acetaminophen (TYLENOL) tablet 650 mg, 650 mg, Oral, Q4H  methocarbamol (ROBAXIN) tablet 750 mg, 750 mg, Oral, 4x Daily  lidocaine 4 % external patch 2 patch, 2 patch, TransDERmal, Daily  oxyCODONE (ROXICODONE) immediate release tablet 5 mg, 5 mg, Oral, Q4H PRN **OR** oxyCODONE (ROXICODONE) immediate release tablet 10 mg, 10 mg, Oral, Q4H PRN  ketorolac (TORADOL) injection 15 mg, 15 mg, IntraVENous, Q6H  HYDROmorphone (DILAUDID) injection 0.25 mg, 0.25 mg, IntraVENous, Q4H PRN  donepezil (ARICEPT) tablet 5 mg, 5 mg, Oral, Daily  enoxaparin Sodium (LOVENOX) injection 30 mg, 30 mg, SubCUTAneous, BID  QUEtiapine (SEROQUEL) tablet 12.5 mg, 12.5 mg, Oral, Nightly  Vitamin D (CHOLECALCIFEROL) tablet 1,000 Units, 1,000 Units, Oral, Daily  sodium chloride flush 0.9 % injection 10 mL, 10 mL, IntraVENous, 2 times per day  sodium chloride flush 0.9 % injection 10 mL, 10 mL, IntraVENous, PRN  0.9 % sodium chloride infusion, , IntraVENous, PRN  sennosides-docusate sodium (SENOKOT-S) 8.6-50 MG tablet 1 tablet, 1 tablet, Oral, BID  magnesium hydroxide (MILK OF MAGNESIA) 400 MG/5ML suspension 30 mL, 30 mL, Oral, Daily PRN  bisacodyl (DULCOLAX) suppository 10 mg, 10 mg, Rectal, Daily PRN  lactated ringers IV soln infusion, , IntraVENous, Continuous    Teddy Lozano MD  Orthopaedic Surgery

## 2023-02-28 NOTE — OP NOTE
Operative Note      Patient: Yusra Tee  YOB: 1940  MRN: [id number]    Date of Procedure: 2/27/2023    Pre-Op Diagnosis: POSTERIOR DISLOCATION OF LEFT HIP    Post-Op Diagnosis:   1. Posterior dislocation with dissociation of the outer ball of the left hip hemiarthroplasty  2. Posterior wall fracture left acetabulum       Procedure(s):  REVISION LEFT HIP ARTHROPLASTY     Surgeon(s): Santo Sofia MD    Assistant:   First Assistant: Sandro Carey    Anesthesia: Regional, spinal, MAC    Estimated Blood Loss (mL): 106    Complications: Other: Mild breach of the medial wall with some acetabular protrusio noted on the PACU imaging. There was some interval change from the fluoroscopy x-rays Intra-Op versus the PACU x-rays. See my separate post op note regarding this. Specimens:   ID Type Source Tests Collected by Time Destination   A : LEFT HIP HARDWARE - FOR IDENTIFICATION Hardware Joint, Hip SURGICAL PATHOLOGY Santo Sofia MD 2/27/2023 1457        Implants:  Implant Name Type Inv. Item Serial No.  Lot No. LRB No. Used Action   SHELL ACET MULT HOLE D 50 MM TRIDENT II TRIATHLON - [id number]  SHELL ACET MULT HOLE D 50 MM TRIDENT II TRIATHLON  [employer] [facility] [id number] Left 1 Implanted   INSERT ACET D 10 DEG 32 MM HIP X3 TRIDENT - [id number]  INSERT ACET D 10 DEG 32 MM HIP X3 TRIDENT  [employer] [facility] [id number] Left 1 Implanted   SCREW BNE L25MM DIA6. 5MM HEX [facility] TRIDENT II - [id number]  SCREW BNE L25MM DIA6. 5MM HEX [facility] TRIDENT II  [employer] [facility] [id number] Left 1 Implanted   SCREW BNE L30MM DIA6.5MM STD CANC HIP TI HMSPHR THRD DRVR - [id number]  SCREW BNE L30MM DIA6.5MM STD CANC HIP TI HMSPHR THRD DRVR  [employer] [facility] [id number] Left 1 Implanted   HEAD FEM [id number] +12MM OFFSET HIP CO CHROM POLYETH TAPR  - [id number]  HEAD FEM [id number] +12MM OFFSET HIP CO CHROM POLYETH TAPR   [employer] [facility] [id number] Left 1 Implanted   SCREW BNE L15MM DIA6. 5MM LO PROF HEX TRIDENT LL - [id number]  SCREW BNE L15MM DIA6. 5MM LO PROF HEX TRIDENT LL  [employer] [id number]  Left 1 Implanted         Drains: none    Findings: left hip hemiarthroplasty dislocation with dissociation of the outer ball, posterior wall acetabular fracture    Detailed Description of Procedure:     IMPLANTS:  Acetabular component: [place] Trident II Tritanium 50 mm multihole  Acetabular screws: 3 screws 30, 25, 15 mm   Acetabular liner: Trident X3 10 degree elevated liner with inner diameter of 32  Femoral component: Existing cemented femoral stem not changed  Femoral head: 32 mm +12 mm cobalt chrome head     SURGICAL DETAILS:  1) Incision type: Posterior  2) Incision length: Utilized the prior incision  3) Muscle repair:  External rotators  4) Case duration: Approximately 2 hours  5) Estimated blood loss: 150 cc  6) Crystalloid replacement: IV crystalloid  7) Anesthesia type: Regional, spinal, MAC  8) Capsular injection: None  9) Specimens removed: Components from the bipolar head were sent for identification purposes  10) Preoperative antibiotics: Clindamycin and vancomycin  11) TXA:  2g intravenous, 1 g at the start and 1 g at closure    INDICATIONS FOR PROCEDURE:  The patient presented to the emergency department the day prior after a fall at her dementia care facility. She was found to have a posterior dislocation of the hemiarthroplasty that was performed just a few weeks ago. Emergency department attempted to reduce the dislocation but in subsequent reduction attempts disassociated the outer ball of the bipolar hemiarthroplasty. They were able to reduce the inner ball into the native acetabulum but the outer ball remained . Patient was subsequently admitted to the trauma service. I took over her care on 2/27/2023. Patient remains in a baseline altered mental status state secondary to severe dementia.   I had a discussion with her power of  about options including doing nothing at all, palliative care, open surgery to remove the  outer ball and repeat hemiarthroplasty versus revision total hip replacement. Based on the x-rays I had a high suspicion for a posterior wall fracture. I did obtain a CT scan which was not as useful as hoped secondary to metal artifact and her diffuse osteopenia. I discussed with her power of  that if she wanted us to proceed with further surgery the plan would be to open the hip to evaluate whether or not there was a posterior wall fracture. If there was a posterior wall fracture the plan was to convert to a total hip replacement. There was no posterior wall fracture the plan was to revise the hemiarthroplasty to a longer ball which would increase her offset and leg length. She understands this would make the leg longer with the trade-off being the improved stability of the hip. She was understanding and accepting of this. If there was indeed a posterior wall fracture the plan was to convert to a total hip replacement which would have its own increased risk given the greater nature of the surgery with increased physiologic demand, increased blood loss, increased possibility damage to nerves and blood vessels, increased risk of complications related to the acetabular component such as fixation failure, aseptic or septic loosening, acetabular protrusio, recurrent dislocation. Again we reviewed the other risks, complications, and benefits of the procedure have been discussed preoperatively previously to include but not limited to infection, bleeding, anesthesia risks, sciatic nerve palsy, instability, limb length discrepancy, aseptic loosening, osteolysis, DVT, continued pain, iatrogenic fracture, MI, stroke, and death. Her power of  was adamant to proceed with surgery. Informed consent was obtained. PROCEDURE:  The patient was seen in the preoperative holding area.   The operative site was confirmed and marked. SCD was placed on the nonoperative leg. The patient was brought to the operating room and placed on the operating room table in the supine position. After anesthesia was established, the patient was positioned in the lateral decubitus position with the operative side up. All bony prominences were padded. A surgical time out was taken to confirm the operative side and planned procedure. The patient was given prophylactic antibiotics with vancomycin and clindamycin within one hour of skin incision. Vancomycin was given over concerns for MRSA infection and the other antibiotic for gram negative coverage. The patient was also given TXA at the start of the case and again at skin closure. The hip was prepped and draped in the usual sterile fashion. The prior incision which was posterior lateral to the hip was utilized. The incision was carried through the subcutaneous tissue to the underlying fascia dl and gluteus imtiaz fascia, which were incised. The Charnley retractor was placed after carefully palpating the sciatic nerve which was protected throughout the case. Once down into the hip joint was evidence she had complex tearing to the posterior hip capsule that had previously been repaired. The outer ball of the hemiarthroplasty component was sitting adjacent to the hip. This was removed. The acetabulum was inspected and there was a small posterior wall fracture. Given the posterior wall fracture I felt that keeping the hemiarthroplasty would not be advisable secondary to the increased risk of dislocation. We then planned to proceed with conversion to total hip replacement. The inner ball was removed from the existing hip stem with a bone tamp and a mallet. The acetabulum was then exposed and reamed to give a line to line press fit. The acetabular component was impacted into position targeting 40-45 degrees of abduction and 20-25 degrees of anteversion.  Screw fixation was utilized to enhance acetabular component fixation. Given her poor bone quality I elected to proceed with the use of a multihole revision style cup. I placed 2 screws posterior superiorly and one screw into the pubis. Fluoroscopy was used to confirm acetabular component position and screw placement. The acetabular liner was placed and impacted into position. I utilized an elevated liner to give her a little bit more stability. The liner locking mechanism engaged. Attention was then turned to the femoral side. I again inspected the femoral stem and found it to be in good position with good anteversion and no sign of loosening or complication. We cleaned the trunnion and inspected and there was no damage. We assessed stability with multiple modular head trials and found the plus 12 mm neck length to be most stable. This was stable with her hip flexed to 90 degrees and internally rotated to 50 degrees with approximately 30 degrees of adduction and posterior directed stress. Her leg length was noted to be a little bit longer but I was accepting this as a trade off for avoiding instability events. We cleaned the trunnion and then placed the final head with a +12 neck length. Leg length assessment and stability assessment of the hip were performed to confirm optimal component selection and we were happy with the stability testing. The wound was irrigated with diluted sterile betadine solution and hemostasis obtained. 2 g vancomycin powder was placed into the hip incision. 1 g TXA was given  at this point. There was not a robust amount of posterior capsule remaining that was suitable for repair. The fascia dl and subcutaneous tissue was closed in layers with combination #5 FiberWire, #1 Vicryl, #1 Stratafix followed by 2-0 Vicryl with skin closure using a running 3-0 subcuticular strata fix and Dermabond Prineo dressing followed by Aquacel dressing.   I reinforced the outer dressing with Daly Rule so that she would avoid pulling off the dressing during her postoperative course. She tolerated this well the first time around. Abduction pillow was placed and a SCD to the operative limb. The patient tolerated the procedure well and was taken to the recovery room in good condition. Sponge, instrument, and needle counts were correct x 2. Complications: None intraop,  however she was noted to have some acetabular protrusio and interval change from the intraoperative fluoroscopy to the PACU x-rays. See my separate note regarding this postoperatively. I discussed this finding immediately with her power of  and gave her the option of returning to the OR immediately for revision versus observation. She elected to proceed with observation. I also reviewed this case with my partner Dr. Alberto Perez who is a hip and knee reconstruction specialist as well and he was in agreement with the course of action. The patient will be touchdown weight of leg flatfoot weightbearing for about 10 to 12 weeks. She will continue strict posterior hip precautions with the use of an abduction pillow while in bed. We will have her work with physical therapy and Occupational Therapy. She will remain on Lovenox for DVT prophylaxis for 35 days. Given the acetabular protrusio we will follow this on serial radiographs of the left hip/pelvis. I will plan to see her back for follow-up on 3/15/2023. There is no need to change the dressing unless it has become saturated. I will remove this at her postoperative visit. This is an addendum for the 52 modifier. Given the patient had a recent hemiarthroplasty we elected to preserve the existing cemented femoral stem as it was in good condition. We revised the head part of the femoral component and placed the acetabular component with multiple screws and a revision style cup. Her acetabular fracture and osteopenia made this more challenging and required the use of a revision style construct.   I have coded this as a revision total hip replacement with 52 modifier for reduced procedural services.     Electronically signed by Serenity Machado MD on 2/28/2023 at 5:33 PM

## 2023-02-28 NOTE — DISCHARGE INSTR - COC
Continuity of Care Form    Patient Name: Zacarias Ortiz   :  1940  MRN:  [id number]    Admit date:  2023  Discharge date:  2023    Code Status Order: DNR-CCA   Advance Directives:   [facility] Documentation       Date/Time Healthcare Directive Type of Healthcare Directive Copy in [address] Agent's Name Healthcare Agent's Phone Number    23 08:23:30 Yes, patient has an advance directive for healthcare treatment Durable power of  for health care Yes, copy in chart Healthcare power of  Mary Diaju --            Admitting Physician:  Moises Amador MD  PCP: Cuong Ceballos MD    Discharging Nurse: Deandre Mora RN  [address] Unit/Room#: J397/L615-84  Discharging Unit Phone Number: [phone]    Emergency Contact:   Extended Emergency Contact Information  Primary Emergency Contact: [address] [facility] Phone: [phone]  Relation: Niece/Nephew  Secondary Emergency Contact: [facility] [place] Phone: [phone]  Relation: Unknown    Past Surgical History:  Past Surgical History:   Procedure Laterality Date    HIP FRACTURE SURGERY  2012    HIP SURGERY Left 2023    LEFT HIP HEMIARTHROPLASTY, PROPHYLACTIC STABILIZATION OF LEFT FEMUR performed by Josiah Guevara MD at [address] [place] [address] Left 2023    REVISON LEFT HIP ARTHROPLASTY TO TOTAL HIP ARTHROPLASTY performed by Josiah Guevara MD at [facility]       Immunization History:   Immunization History   Administered Date(s) Administered    COVID-19, PFIZER GRAY top, DO NOT Dilute, (age 15 y+), IM, 30 mcg/0.3 mL 2022    COVID-19, PFIZER PURPLE top, DILUTE for use, (age 15 y+), 30mcg/0.3mL 2021, 2021, 2021       Active Problems:  Patient Active Problem List   Diagnosis Code    Closed fracture of sacrum, initial encounter ([facility] S32.10XA    Lumbar transverse process fracture, closed, initial encounter ([facility] S32.009A    History of COVID-19 Z86.16    Late onset Alzheimer's dementia without behavioral disturbance ([facility]) G30.1, F02.80    Vitamin D deficiency E55.9    Osteopenia of left thigh M85.852    Fall W19. XXXA    Acute pain due to trauma G89.11    Acute postoperative pain G89.18    Acute postoperative anemia due to expected blood loss D62    [facility] T84.029A, Z96.649    Closed displaced fracture of posterior wall of left acetabulum ([facility]) S32.422A       Isolation/Infection:   Isolation            No Isolation          Patient Infection Status       None to display            Nurse Assessment:  Last Vital Signs: BP (!) 123/48   Pulse (!) 108   Temp 97.7 °F (36.5 °C) (Oral)   Resp 16   Wt 136 lb 11.2 oz (62 kg)   SpO2 100%   BMI 25.00 kg/m²     Last documented pain score (0-10 scale): Pain Level:  (behavioral pain scale- facial grimacing)  Last Weight:   Wt Readings from Last 1 Encounters:   02/28/23 136 lb 11.2 oz (62 kg)     Mental Status:  oriented and alert to self    IV Access:  - None    Nursing Mobility/ADLs:  Walking   Assisted  Transfer  Assisted  Bathing  Assisted  Dressing  Assisted  Toileting  Assisted  Feeding  Assisted  Med Admin  Dependent  Med Delivery   crushed    Wound Care Documentation and Therapy:  Incision 02/12/23 Hip Left;Posterior (Active)   Dressing Status Dry; Intact; Clean 02/27/23 1050   Number of days: 16       Incision 02/27/23 Thigh Anterior; Left (Active)   Dressing Status Clean;Dry; Intact 02/27/23 2025   Drainage Amount None 02/27/23 2025   Number of days: 1        Elimination:  Continence: Bowel: No  Bladder: No  Urinary Catheter: None   Colostomy/Ileostomy/Ileal Conduit: No       Date of Last BM: 02/28/2023    Intake/Output Summary (Last 24 hours) at 2/28/2023 1104  Last data filed at 2/28/2023 0554  Gross per 24 hour   Intake 1200 ml   Output 300 ml   Net 900 ml     I/O last 3 completed shifts:   In: 1200 [I.V.:1000; IV [id number]:946]  Out: 1600 [Urine:1600]    Safety Concerns:     History of Falls (last 30 days) and At Risk for Falls    Impairments/Disabilities:      None    Nutrition Therapy:  Current Nutrition Therapy:   - Oral Diet:  General    Routes of Feeding: Oral  Liquids: Thin Liquids  Daily Fluid Restriction: no  Last Modified Barium Swallow with Video (Video Swallowing Test): not done    Treatments at the Time of Hospital Discharge:   Respiratory Treatments: none  Oxygen Therapy:  is not on home oxygen therapy. Ventilator:    - No ventilator support    Rehab Therapies: Physical Therapy and Occupational Therapy  Weight Bearing Status/Restrictions: No weight bearing restrictions  Other Medical Equipment (for information only, NOT a DME order):  wheelchair and walker  Other Treatments: none    Patient's personal belongings (please select all that are sent with patient):      RN SIGNATURE:  Renard Ceja RN 03/02/2023 1604    CASE MANAGEMENT/SOCIAL WORK SECTION    Inpatient Status Date: 2/26/23    Readmission Risk Assessment Score:  Readmission Risk              Risk of Unplanned Readmission:  15           Discharging to Facility/ Agency   Name: [facility]  Address:  Phone:  Fax:    Dialysis Facility (if applicable)   Name:  Address:  Dialysis Schedule:  Phone:  Fax:    / signature: Electronically signed by FELISHA Akhtar on 2/28/23 at 11:05 AM EST    PHYSICIAN SECTION    Prognosis: Fair    Condition at Discharge: Stable    Rehab Potential (if transferring to Rehab): Fair    Recommended Labs or Other Treatments After Discharge: Toe touch weight bearing to operative extremity with use of walker for assistance with ambulation   Aquacel dressing to be removed pod7 and incision left open to air (3/6)  If staples present can be removed pod14 and steri strips applied after.    Follow up with orthopedic surgeon in two weeks   Continue Lovenox 30mg BID for total of 4 weeks (end date 3/30) for DVT ppx  Follow up with Orthopedic Surgery (Dr. Justine De La Cruz) in 2 weeks  Follow up with PCP regarding incidental findings    Physician Certification: I certify the above information and transfer of Ho Gordon  is necessary for the continuing treatment of the diagnosis listed and that she requires [place] for less 30 days.      Update Admission H&P: No change in H&P    PHYSICIAN SIGNATURE:  Electronically signed by CASSIUS De Leon on 3/2/23 at 2:14 PM EST

## 2023-02-28 NOTE — PROGRESS NOTES
[facility] OCCUPATIONAL THERAPY EVALUATION - ACUTE     NAME: Angelina Found  : 1940 (2 y.o.)  MRN: [id number]  CODE STATUS: DNR-CCA  Room: 69/W269-01    Date of Service: 2023    Patient Diagnosis(es): Frequent falls [R29.6]  Posterior dislocation of left hip, initial encounter [facility]) [S73.015A]  Dislocation of hip joint prosthesis, initial encounter [facility]) [A46.923A, Z96.649]   Patient Active Problem List    Diagnosis Date Noted    Closed displaced fracture of posterior wall of left acetabulum ([address] 2023    Dislocation of hip joint prosthesis, initial encounter ([address] 2023    Acute pain due to trauma 2023    Acute postoperative pain 2023    Acute postoperative anemia due to expected blood loss 2023    Osteopenia of left thigh 2023    Fall 2023    Vitamin D deficiency 2022    History of COVID-19 2022    Late onset Alzheimer's dementia without behavioral disturbance ([address] 2022    Lumbar transverse process fracture, closed, initial encounter ([address]     Closed fracture of sacrum, initial encounter ([address] 2022        Past Medical History:   Diagnosis Date    Asymptomatic varicose veins of both lower extremities     Dementia with behavioral disturbance     Elevated blood pressure reading     Full dentures     History of hip fracture     Small vessel disease, cerebrovascular      Past Surgical History:   Procedure Laterality Date    HIP FRACTURE SURGERY  2012    HIP SURGERY Left 2023    LEFT HIP HEMIARTHROPLASTY, PROPHYLACTIC STABILIZATION OF LEFT FEMUR performed by Albert James MD at [address] [place] [address] 2023    REVISON LEFT HIP ARTHROPLASTY TO TOTAL HIP ARTHROPLASTY performed by Albert James MD at [place] [address]  Restrictions/Precautions: Weight Bearing, Up as Tolerated, Fall Risk      Left Lower Extremity Weight Bearing: Toe Touch Weight Bearing  Hip Precautions: Posterior hip precautions    Safety Devices: Safety Devices  Type of Devices: All fall risk precautions in place     Patient's date of birth confirmed: Yes    General:  Chart Reviewed: Yes  Patient assessed for rehabilitation services?: Yes    Subjective  Subjective: \"I don't know if I can do it\"       Pain at start of treatment: Yes: Pt. unable to rate pain- Crying out and moving uncomfortably    Pain at end of treatment: Yes: Pt. unable to rate pain- Unchanged    Location: L hip  Description Unrated  Nursing notified: Not Applicable  RN: Trina Cooper  Intervention: Repositioned Other: Per family member, pt. Prior Level of Function:  Social/Functional History  Lives With: Home care staff  Type of Home: Assisted living ([facility])  Home Layout: One level  Home Access: Level entry  Bathroom Shower/Tub: Walk-in shower  Has the patient had two or more falls in the past year or any fall with injury in the past year?: Yes  ADL Assistance: Needs assistance  Ambulation Assistance: Needs assistance (Pt was walking with no device prior to christmas, then fell a few times, has been in wc since sx 2 weeks ago with hip fx)  Transfer Assistance: Needs assistance (Since hip fx)  Active : No  Patient's  Info: provided by family or facility  Additional Comments: Information per niece, pt confused    OBJECTIVE:     Orientation Status:  Orientation  Overall Orientation Status: Impaired  Orientation Level: Disoriented X4 (Oriented to first name only)    Observation:  Observation/Palpation  Posture: Poor  Observation: No acute distress noted. Pt significantly confused. Requires frequent redirection. Cognition Status:  Cognition  Overall Cognitive Status: Exceptions  Arousal/Alertness: Inconsistent responses to stimuli  Following Commands: Inconsistently follows commands  Attention Span: Difficulty attending to directions; Difficulty dividing attention  Memory: Decreased recall of precautions;Decreased recall of recent events;Decreased short term memory;Decreased long term memory  Safety Judgement: Decreased awareness of need for safety;Decreased awareness of need for assistance  Problem Solving: Assistance required to generate solutions;Assistance required to identify errors made;Assistance required to correct errors made;Assistance required to implement solutions  Insights: Not aware of deficits  Initiation: Requires cues for all  Sequencing: Requires cues for all  Cognition Comment: Poor sequencing and safety    Perception Status:  Perception  Overall Perceptual Status: WFL    Vision and Hearing Status:  Hearing  Hearing: Within functional limits   Vision - Basic Assessment  Prior Vision: No visual deficits  Visual History: Cataracts; Corrective eye surgery  Patient Visual Report: No visual complaint reported. Visual Field Cut: No  Oculo Motor Control: WNL  Vision Adaptations: Cataract surgery  Vision Comments: Keeps eyes closed for most of eval    GROSS ASSESSMENT AROM/PROM:  AROM: Generally decreased, functional       ROM:   LUE AROM (degrees)  LUE AROM : WFL  Left Hand AROM (degrees)  Left Hand AROM: WFL  RUE AROM (degrees)  RUE AROM : WFL  Right Hand AROM (degrees)  Right Hand AROM: WFL    UE STRENGTH:  Strength: Generally decreased, functional    UE COORDINATION:  Coordination: Generally decreased, functional    UE TONE:  Tone: Normal    UE SENSATION:  Sensation: Intact    Hand Dominance:  Hand Dominance  Hand Dominance: Right    ADL Status:  ADL  Feeding: Dependent/Total  Grooming: Dependent/Total  UE Bathing: Dependent/Total  LE Bathing: Dependent/Total  UE Dressing: Dependent/Total  LE Dressing: Dependent/Total  Toileting: Dependent/Total  Additional Comments: Simulated ADLs at edge of bed. Pt with significant difficulty reaching outside base of support or initiating any reaching. All tasks limited d/t confusion and fatigue.   Toilet Transfers  Toilet Transfer: Unable to assess  Toilet Transfers Comments: Anticipate dependent    Functional Mobility:    Transfers  Sit to stand: Unable to assess  Stand to sit: Unable to assess  Transfer Comments: Unsafe to attempt standing    Patient ambulated NT due to unsafe to participate in standing this date    Bed Mobility  Bed mobility  Supine to Sit: 2 Person assistance;Dependent/Total  Sit to Supine: 2 Person assistance;Dependent/Total  Bed Mobility Comments: Unable to follow effectively for follow through. Seated and Standing Balance:  Balance  Sitting: Impaired  Sitting - Static: Fair (occasional)  Sitting - Dynamic: Poor (constant support)  Standing: Impaired  Standing - Static: Poor  Standing - Dynamic: Poor    Functional Endurance:  Activity Tolerance  Activity Tolerance: Patient Tolerated treatment well    D/C Recommendations:  OT D/C RECOMMENDATIONS  REQUIRES OT FOLLOW-UP: Yes    Equipment Recommendations:  OT Equipment Recommendations  Other: Continue to assess    OT Education:   Patient Education  Education Given To: Patient; Family  Education Provided: Role of Therapy;Plan of Care  Education Method: Verbal  Barriers to Learning: Cognition  Education Outcome: Verbalized understanding (Family reports understanding)    OT Follow Up:   OT D/C RECOMMENDATIONS  REQUIRES OT FOLLOW-UP: Yes       Assessment/Discharge Disposition:  Assessment: Pt is an 80year old woman from home who presents to [facility] with the above deficits which impact her ability to perform ADLs and IADLs. Pt. would benefit from continued OT to maximize independence and safety with ADL tasks and limit caregiver burden.   Performance deficits / Impairments: Decreased functional mobility , Decreased ADL status, Decreased strength, Decreased safe awareness, Decreased cognition, Decreased endurance, Decreased balance, Decreased fine motor control, Decreased coordination  Prognosis: Good  Discharge Recommendations: Continue to assess pending progress  Decision Making: Medium Complexity  History: Pt's medical history is moderately complex  Exam: Pt. has 9 performance deficits  Assistance / Modification: Pt requires total assist    AMPAC (Six Click) Self care Score   How much help is needed for putting on and taking off regular lower body clothing?: Total  How much help is needed for bathing (which includes washing, rinsing, drying)?: Total  How much help is needed for toileting (which includes using toilet, bedpan, or urinal)?: Total  How much help is needed for putting on and taking off regular upper body clothing?: Total  How much help is needed for taking care of personal grooming?: Total  How much help for eating meals?: Total  AM-PAC Inpatient Daily Activity Raw Score: 6  AM-PAC Inpatient ADL T-Scale Score : 17.07  ADL Inpatient CMS 0-100% Score: 100    Therapy key for assistance levels -   Independent/Mod I = Pt. is able to perform task with no assistance but may require a device   Stand by assistance = Pt. does not perform task at an independent level but does not need physical assistance, requires verbal cues  Minimal, Moderate, Maximal Assistance = Pt. requires physical assistance (25%, 50%, 75% assist from helper) for task but is able to actively participate in task   Dependent = Pt. requires total assistance with task and is not able to actively participate with task completion     Plan:  Occupational Therapy Plan  Times Per Week: 1-4x  Therapy Duration:  (Length of acute stay)  Current Treatment Recommendations: Strengthening, Balance training, Functional mobility training, Endurance training, Pain management, Safety education & training, Patient/Caregiver education & training, Equipment evaluation, education, & procurement, Self-Care / ADL, Home management training, Cognitive/Perceptual training, Coordination training    Goals:   Patient will:    - Improve functional endurance to tolerate/complete 30 mins of ADL's  - Be Max A in UB ADLs   - Be able to participate actively in LB ADLs  - Be Max A in ADL transfers without LOB  - Be Max A in toileting tasks  - Improve B UE strength and endurance to 3+/5 in order to participate in self-care activities as projected. - Access appropriate D/C site with as few architectural barriers as possible. - Sequence self-care tasks with no verbal cues for safety    Patient Goal: Patient goals : Pt unable to state a goal. Family goal to return to as much mobility as possible     Discussed and agreed upon: Yes Comments:       Therapy Time:   Individual   Time In 0942   Time Out 1001   Minutes 19     Eval: 19 minutes     Electronically signed by:     SARAH BETH Vazquez,   2/28/2023, 1:00 PM

## 2023-02-28 NOTE — CONSULTS
Chief Complaint   Patient presents with    Fall        Patient is a 80 y.o. female who presents with a chief complaint of fall and dislocated hip. . Patient is followed on a regular basis by Dr. Augie Bermudez MD. patient with a recent hip fracture status post surgery now presents with fall and dislocation requiring surgical intervention. Patient underwent surgery yesterday without any perioperative events. She denies any chest pain chest pressure heaviness. Does have history of PSVT from previous admission treated medically. She is DNR CCA. No recent cardiac testing. No previous echo or stress test on chart. Past Medical History:   Diagnosis Date    Asymptomatic varicose veins of both lower extremities     Dementia with behavioral disturbance     Elevated blood pressure reading     Full dentures     History of hip fracture 2012    Small vessel disease, cerebrovascular       Patient Active Problem List   Diagnosis    Closed fracture of sacrum, initial encounter ([address]    Lumbar transverse process fracture, closed, initial encounter ([address]    History of COVID-19    Late onset Alzheimer's dementia without behavioral disturbance ([address]    Vitamin D deficiency    Osteopenia of left thigh    Fall    Acute pain due to trauma    Acute postoperative pain    Acute postoperative anemia due to expected blood loss    Dislocation of hip joint prosthesis, initial encounter ([address]    Closed displaced fracture of posterior wall of left acetabulum [facility])       Past Surgical History:   Procedure Laterality Date    HIP FRACTURE SURGERY  2012    HIP SURGERY Left 2/12/2023    LEFT HIP HEMIARTHROPLASTY, PROPHYLACTIC STABILIZATION OF LEFT FEMUR performed by Royer Knox MD at [address] [place] [address] Left 2/27/2023    REVISON LEFT HIP ARTHROPLASTY TO TOTAL HIP ARTHROPLASTY performed by Royer Knox MD at [facility] History     Socioeconomic History    Marital status:       Spouse name: None Number of children: None    Years of education: None    Highest education level: None   Tobacco Use    Smoking status: Unknown    Smokeless tobacco: Never   Vaping Use    Vaping Use: Never used   Social History Narrative    No children    States she has worked in [employer] in [address] [facility] dementia with behavior problems in 2020    Resides at [facility]     Social Determinants of Health     Physical Activity: Insufficiently Active    Days of Exercise per Week: 2 days    Minutes of Exercise per Session: 30 min       Family History   Family history unknown: Yes       Current Facility-Administered Medications   Medication Dose Route Frequency Provider Last Rate Last Admin    pantoprazole (PROTONIX) tablet 20 mg  20 mg Oral QAM AC Angela Larson MD   20 mg at 02/28/23 1207    ascorbic acid (VITAMIN C) tablet 500 mg  500 mg Oral Daily Angela Larson MD   500 mg at 02/28/23 1206    iron polysaccharides (NIFEREX) capsule 150 mg  150 mg Oral Daily Angela Larson MD   150 mg at 02/28/23 1206    acetaminophen (TYLENOL) tablet 650 mg  650 mg Oral Q4H [address] [place] [address]   650 mg at 02/28/23 1207    methocarbamol (ROBAXIN) tablet 750 mg  750 mg Oral 4x Daily Boston Dolan PA-C   750 mg at 02/28/23 1207    lidocaine 4 % external patch 2 patch  2 patch TransDERmal Daily Nathan Isaacs   2 patch at 02/28/23 1202    oxyCODONE (ROXICODONE) immediate release tablet 5 mg  5 mg Oral Q4H PRN Boston Dolan PA-C   5 mg at 02/28/23 8997    Or    oxyCODONE (ROXICODONE) immediate release tablet 10 mg  10 mg Oral Q4H PRN Boston Dolan PA-C        HYDROmorphone (DILAUDID) injection 0.25 mg  0.25 mg IntraVENous Q4H PRN Boston Dolan PA-C        donepezil (ARICEPT) tablet 5 mg  5 mg Oral Daily Boston Dolan PA-C   5 mg at 02/28/23 1207    enoxaparin Sodium (LOVENOX) injection 30 mg  30 mg SubCUTAneous BID Maikel Flanaganand, [place]   30 mg at 02/28/23 1202    QUEtiapine (SEROQUEL) tablet 12.5 mg  12.5 mg Oral Nightly [facility] [place], [place]   12.5 mg at 02/27/23 2047    Vitamin D (CHOLECALCIFEROL) tablet 1,000 Units  1,000 Units Oral Daily Maikel Rasmussen, [place]   1,000 Units at 02/28/23 1050    sodium chloride flush 0.9 % injection 10 mL  10 mL IntraVENous 2 times per day Maikel Rasmussen PA-C   10 mL at 02/27/23 2047    sodium chloride flush 0.9 % injection 10 mL  10 mL IntraVENous PRN Maikel Rasmussen PA-C        0.9 % sodium chloride infusion   IntraVENous PRN Maikel Rasmussen PA-C        sennosides-docusate sodium (SENOKOT-S) 8.6-50 MG tablet 1 tablet  1 tablet Oral BID Maikel Flanagan[place], [place]   1 tablet at 02/28/23 1206    magnesium hydroxide (MILK OF MAGNESIA) 400 MG/5ML suspension 30 mL  30 mL Oral Daily PRN Maikel Rasmussen PA-C        bisacodyl (DULCOLAX) suppository 10 mg  10 mg Rectal Daily PRN Maikel Rasmussen PA-C   10 mg at 02/27/23 1049    lactated ringers IV soln infusion   IntraVENous Continuous Maikel Rasmussen PA-C 100 mL/hr at 02/27/23 1340 New Bag at 02/27/23 1442       ALLERGIES: Bactrim [sulfamethoxazole-trimethoprim], Bee venom, Ceftin [cefuroxime], and Wasp venom    Review of Systems   Constitutional: Negative. Negative for chills and fever. HENT: Negative. Eyes: Negative. Respiratory:  Negative for shortness of breath and wheezing. Cardiovascular:  Negative for chest pain, palpitations and leg swelling. Gastrointestinal: Negative. Negative for abdominal pain, nausea and vomiting. Endocrine: Negative. Genitourinary: Negative. Musculoskeletal: Negative. Skin: Negative. Negative for rash. Allergic/Immunologic: Negative. Neurological: Negative. Negative for dizziness, weakness and headaches. Hematological: Negative. Psychiatric/Behavioral: Negative. VITALS:  Blood pressure (!) 123/48, pulse (!) 108, temperature 97.7 °F (36.5 °C), temperature source Oral, resp. rate 16, weight 136 lb 11.2 oz (62 kg), SpO2 100 %. Body mass index is 25 kg/m². Physical Exam  Vitals and nursing note reviewed. Constitutional:       Appearance: She is well-developed. She is not diaphoretic. HENT:      Head: Normocephalic and atraumatic. Eyes:      Pupils: Pupils are equal, round, and reactive to light. Neck:      Thyroid: No thyromegaly. Vascular: No JVD. Trachea: No tracheal deviation. Cardiovascular:      Rate and Rhythm: Normal rate and regular rhythm. Chest Wall: PMI is not displaced. Pulses: Intact distal pulses. Heart sounds: Normal heart sounds. Heart sounds not distant. No murmur heard. No friction rub. No gallop. No S3 sounds. Pulmonary:      Effort: No respiratory distress. Breath sounds: No wheezing or rales. Chest:      Chest wall: No tenderness. Abdominal:      General: Bowel sounds are normal. There is no distension. Palpations: Abdomen is soft. There is no mass. Tenderness: There is no abdominal tenderness. There is no guarding or rebound. Musculoskeletal:         General: Normal range of motion. Cervical back: Normal range of motion and neck supple. Skin:     General: Skin is warm and dry. Coloration: Skin is not pale. Findings: No erythema or rash. Neurological:      Mental Status: She is alert and oriented to person, place, and time. Cranial Nerves: No cranial nerve deficit. Psychiatric:         Behavior: Behavior normal.         Thought Content:  Thought content normal.         Judgment: Judgment normal.       LABS:  Recent Results (from the past 24 hour(s))   CBC with Auto Differential    Collection Time: 02/28/23  4:53 AM   Result Value Ref Range    WBC 8.0 4.8 - 10.8 K/uL    RBC 3.17 (L) 4.20 - 5.40 M/uL    Hemoglobin 8.4 (L) 12.0 - 16.0 g/dL    Hematocrit 26.1 (L) 37.0 - 47.0 %    MCV 82.3 79.4 - 94.8 fL    MCH 26.6 (L) 27.0 - 31.3 pg    MCHC 32.3 (L) 33.0 - 37.0 %    RDW 15.8 (H) 11.5 - 14.5 %    Platelets 913 (H) 620 - 400 K/uL    Neutrophils % 68.4 %    Lymphocytes % 16.4 %    Monocytes % 13.7 %    Eosinophils % 1.0 %    Basophils % 0.5 %    Neutrophils Absolute 5.5 1.4 - 6.5 K/uL    Lymphocytes Absolute 1.3 1.0 - 4.8 K/uL    Monocytes Absolute 1.1 (H) 0.2 - 0.8 K/uL    Eosinophils Absolute 0.1 0.0 - 0.7 K/uL    Basophils Absolute 0.0 0.0 - 0.2 K/uL   Basic Metabolic Panel w/ Reflex to MG    Collection Time: 02/28/23  4:53 AM   Result Value Ref Range    Sodium 140 135 - 144 mEq/L    Potassium reflex Magnesium 4.0 3.4 - 4.9 mEq/L    Chloride 106 95 - 107 mEq/L    CO2 23 20 - 31 mEq/L    Anion Gap 11 9 - 15 mEq/L    Glucose 83 70 - 99 mg/dL    BUN 6 (L) 8 - 23 mg/dL    Creatinine 0.61 0.50 - 0.90 mg/dL    Est, Glom Filt Rate >60.0 >60    Calcium 8.2 (L) 8.5 - 9.9 mg/dL   Hemoglobin and Hematocrit    Collection Time: 02/28/23  3:11 PM   Result Value Ref Range    Hemoglobin 7.8 (L) 12.0 - 16.0 g/dL    Hematocrit 23.7 (L) 37.0 - 47.0 %     Troponin:   Lab Results   Component Value Date/Time    TROPONINI <0.10 01/13/2021 12:00 AM       EKG: normal sinus rhythm, nonspecific ST and T waves changes      ASSESSMENT:    Active Hospital Problems    Diagnosis Date Noted    Closed displaced fracture of posterior wall of left acetabulum ([address] [S32.422A] 02/27/2023     Priority: Medium    Dislocation of hip joint prosthesis, initial encounter ([address] [L91.168M, Z96.269] 02/26/2023     Priority: Medium    Acute pain due to trauma [G89.11] 02/13/2023     Priority: Medium    Acute postoperative pain [G89.18] 02/13/2023     Priority: Medium    Acute postoperative anemia due to expected blood loss [D62] 02/13/2023     Priority: Medium    Fall [W19. XXXA] 02/12/2023     Priority: Medium    Late onset Alzheimer's dementia without behavioral disturbance ([address] [G30.1, F02.80] 02/21/2022     Priority: Low       Preoperative clearance-did well with surgery  History of PSVT-currently normal sinus rhythm    PLAN:   Conservative medical therapy from cardiology standpoint. GI/DVT prophylaxis  Maintain potassium between 4-5 and magnesium greater than 2  No plans for any cardiac testing at this time due to DNR CCA. No further cardiac recommendations. Patient did well postoperatively.   Please call with question

## 2023-02-28 NOTE — PROGRESS NOTES
TRAUMA DAILY PROGRESS NOTE      Patient Name: Sherri Paulino  Admission Date 2023    Hospital Day: 2  Patient seen and examined on 2023    INTERVAL HISTORY/EVENTS     Background: Sherri Paulino is a 80 y.o. female with a PMHx of dementia and recent admission to our service from 23-2/15/23 s/p left hemiarthroplasty with Orthopaedic Surgery (Dr. Robin Rodrigues) on 2/12/15 presented to [facility] ED on 2023 s/p unwitnessed fall from bed at [facility]. Trauma workup found left posterior hip dislocation of prosthetic acetabulum s/p reduction of posterior dislocation left hip arthroplasty be ED prior to trauma consult evaluation. Hospital Course:  2023:  Presented to ED s/p unwitnessed fall from bed at [facility]. ED with attempted reduction prior to Trauma Consult. Prosthetic hip with partial reduction. Admitted to trauma. Ortho consulted. NPO at midnight for possible OR with Ortho tomorrow. 2023: s/p conversion to left total hip replacement      24 Hour Events:  POD#1 s/p conversion to left total hip replacement. Patient with complaint of left hip pain today. Tolerating PO but with poor appetite. Voiding spontaneously with external catheter. No BM. Niece at bedside. Incidentals discussed. Labs: no leukocytosis. H/H downtrend to 8.4/26. 1. electrolytes and renal function appropriate. Vitals reviewed: afebrile. Mildly tachycardic with HR max at 108. Normotensive. SPO2 % on RA. PHYSICAL EXAM     Vitals Average, Min and Max for last 24 hours:  Temp: Temp: 97.7 °F (36.5 °C);  Temp  Av.6 °F (36.4 °C)  Min: 96.8 °F (36 °C)  Max: 98.3 °F (36.8 °C)  Respirations: Resp  Av.9  Min: 9  Max: 20  Pulse: Pulse  Av.5  Min: 81  Max: 108  Blood Pressure: Systolic (97CFJ), VMM:860 , Min:123 , MOD:735   ; Diastolic (13YSK), ADN:92, Min:45, Max:91  SpO2: SpO2  Av.2 %  Min: 93 %  Max: 100 %    24hr Intake/Output:   Intake/Output Summary (Last 24 hours) at 2023 0434  Last data filed at 2/27/2023 1540  Gross per 24 hour   Intake 1200 ml   Output --   Net 1200 ml       Vitals: BP (!) 123/52   Pulse (!) 108   Temp 97.7 °F (36.5 °C) (Oral)   Resp 16   Wt 136 lb 11.2 oz (62 kg)   SpO2 100%   BMI 25.00 kg/m²     Physical Exam:  Constitutional: Lying supine in bed. NAD. Resting comfortably. Niece at bedside. HEENT: Atraumatic normocephalic. Cardiovascular: RRR at bedside. DP/PT 2+ BLE  Pulmonary: Breaths unlabored on room air. Abdominal: Soft. Non-distended. Non-tender. Musculoskeletal: LLE aquacel dressing w/o strikethrough. Left thigh soft/compressible. Gross motor/sensation to BLE  Neurological: drowsy, awake, and orientated x1 (self, niece reports this as baseline). Motor and sensory grossly intact. No focal deficits. GCS of 14 (E4, V4, M6).     LABORATORY RESULTS (LAST 24 HOURS)     CBC with Differential:    Lab Results   Component Value Date/Time    WBC 8.0 02/28/2023 04:53 AM    RBC 3.17 02/28/2023 04:53 AM    HGB 8.4 02/28/2023 04:53 AM    HCT 26.1 02/28/2023 04:53 AM     02/28/2023 04:53 AM    MCV 82.3 02/28/2023 04:53 AM    MCH 26.6 02/28/2023 04:53 AM    MCHC 32.3 02/28/2023 04:53 AM    RDW 15.8 02/28/2023 04:53 AM    LYMPHOPCT 16.4 02/28/2023 04:53 AM    MONOPCT 13.7 02/28/2023 04:53 AM    EOSPCT 0.0 01/13/2021 12:00 AM    BASOPCT 0.5 02/28/2023 04:53 AM    MONOSABS 1.1 02/28/2023 04:53 AM    LYMPHSABS 1.3 02/28/2023 04:53 AM    EOSABS 0.1 02/28/2023 04:53 AM    BASOSABS 0.0 02/28/2023 04:53 AM     CMP:    Lab Results   Component Value Date/Time     02/28/2023 04:53 AM    K 4.0 02/28/2023 04:53 AM     02/28/2023 04:53 AM    CO2 23 02/28/2023 04:53 AM    BUN 6 02/28/2023 04:53 AM    CREATININE 0.61 02/28/2023 04:53 AM    GFRAA >60.0 01/22/2022 06:37 AM    LABGLOM >60.0 02/28/2023 04:53 AM    GLUCOSE 83 02/28/2023 04:53 AM    PROT 6.9 02/26/2023 07:15 AM    LABALBU 3.3 02/26/2023 07:15 AM    CALCIUM 8.2 02/28/2023 04:53 AM    BILITOT 0.5 02/26/2023 07:15 AM ALKPHOS 104 02/26/2023 07:15 AM    AST 26 02/26/2023 07:15 AM    ALT 27 02/26/2023 07:15 AM     Magnesium:  No results found for: MG  PT/INR:    Lab Results   Component Value Date/Time    PROTIME 14.4 02/26/2023 07:15 AM    INR 1.1 02/26/2023 07:15 AM     PTT:    Lab Results   Component Value Date/Time    APTT 34.3 02/26/2023 07:15 AM       IMAGING RESULTS (PERSONALLY REVIEWED)     All admission and follow up imaging reviewed. ASSESSMENT & PLAN     Diagnoses:  [facility]  Posterior dislocation of left hip  Dislocation of hip joint prosthesis  Acute pain due to trauma  Acute post-operative pain  Acute blood loss anemia    PMHx: dementia and recent admission to our service from 2/11/23-2/15/23 s/p left hemiarthroplasty    Incidental Findings: Discussed by LUCRETIA, 2/28/2023  There is a large cervical cyst identified at 5.3 cm which can be further characterized with ultrasound if clinically indicated. ASSESSMENT/PLAN:  Neurological: Acute pain due to trauma, acute post-op pain. Hx dementia  - Continue Tylenol 650mg q6hr scheduled  - Continue Oxycodone 2.5/5mg q4hr prn mod/severe pain  - Continue home donepezil 5mg daily  - Continue home seroquel 12.5mg daily     Cardiovascular: Patient did have several episodes of self limiting SVT last admission, family at the time opted to not treat given DNR-CCA status. - Vital signs per unit protocol  - Pre-op EKG ordered  - Continue cardiac telemetry  - Cardiology: No cardiac testing at this time. Signed off.      Respiratory: Sating appropriately on RA  - Maintain O2 sats > 92%  - Encourage IS 10x hourly     GI/Diet: No acute issues  - Continue Regular diet   - Bowel regimen: senokot BID, miralax daily     Renal/Electrolytes: No acute electrolyte abnormalities, BUN/Cr within normal limits on last BMP  - HLIV  - AM BMP     ID: No leukocytosis, afebrile  - Liliana-op abx completed     Heme: Down trend in H&H  - No indication for transfusion  - AM CBC  - afternoon H/H to trend     Endocrine: No acute or chronic concerns      MSK: Left posterior hip dislocation of prosthetic acetabulum s/p reduction of posterior dislocation left hip arthroplasty be ED prior to trauma consult evaluation (POD #1 s/p conversion to left total hip replacement). s/p unwitnessed fall from bed at [facility] 2/26. s/p left hemiarthroplasty with Orthopaedic Surgery (Dr. Jac Sam) on 2/12/23.  - Ortho following  - Spines: Clear  - Weight Bearing Restrictions: TTWB LLE, posterior hip precautions, no pivoting  - Activity: Ambulatory with assistance  - PT/OT: recommend SNF     Prophylaxis:   - SCDs and Lovenox 30mg BID for VTE PPx     Lines/Tubes/Drains:  - Maintain PIVs  - No indication for harris catheter, monitor for urinary retention        Dispo: remain on trauma service for Cardiology final recs and H/H trend. Anticipate medical clearance tomorrow if H/H stabilizes. Accom Status: General Status       - Follow up with Orthopedics (Dr. Jac Sam) TBD.  - Follow up with PCP for chronic medical conditions  - No trauma follow up indicated.        Tone Maki PA-C  Trauma/Critical Care  [see Treatment Team Sticky Note for contact information]     This patient's plan of care was discussed and made in collaboration with Trauma Attending physician, Gunner Pollard MD.

## 2023-02-28 NOTE — PLAN OF CARE
See OT evaluation for all goals and OT POC.  Electronically signed by QUINCY Batista/L on 2/28/2023 at 1:05 PM

## 2023-03-01 LAB
ANION GAP SERPL CALCULATED.3IONS-SCNC: 10 MEQ/L (ref 9–15)
BUN BLDV-MCNC: 5 MG/DL (ref 8–23)
CALCIUM SERPL-MCNC: ABNORMAL MG/DL (ref 8.5–9.9)
CHLORIDE BLD-SCNC: 102 MEQ/L (ref 95–107)
CO2: 24 MEQ/L (ref 20–31)
CREAT SERPL-MCNC: 0.53 MG/DL (ref 0.5–0.9)
GFR SERPL CREATININE-BSD FRML MDRD: >60 ML/MIN/{1.73_M2}
GLUCOSE BLD-MCNC: 103 MG/DL (ref 70–99)
HCT VFR BLD CALC: 26.6 % (ref 37–47)
HEMOGLOBIN: 8.8 G/DL (ref 12–16)
MAGNESIUM: 1.8 MG/DL (ref 1.7–2.4)
MCH RBC QN AUTO: 27.3 PG (ref 27–31.3)
MCHC RBC AUTO-ENTMCNC: 33 % (ref 33–37)
MCV RBC AUTO: 82.8 FL (ref 79.4–94.8)
PDW BLD-RTO: 15.9 % (ref 11.5–14.5)
PLATELET # BLD: 465 K/UL (ref 130–400)
POTASSIUM REFLEX MAGNESIUM: 3.5 MEQ/L (ref 3.4–4.9)
RBC # BLD: 3.21 M/UL (ref 4.2–5.4)
SODIUM BLD-SCNC: 136 MEQ/L (ref 135–144)
WBC # BLD: 8.1 K/UL (ref 4.8–10.8)

## 2023-03-01 PROCEDURE — 6370000000 HC RX 637 (ALT 250 FOR IP): Performed by: PHYSICIAN ASSISTANT

## 2023-03-01 PROCEDURE — 83735 ASSAY OF MAGNESIUM: CPT

## 2023-03-01 PROCEDURE — 2580000003 HC RX 258: Performed by: PHYSICIAN ASSISTANT

## 2023-03-01 PROCEDURE — 36415 COLL VENOUS BLD VENIPUNCTURE: CPT

## 2023-03-01 PROCEDURE — 99232 SBSQ HOSP IP/OBS MODERATE 35: CPT | Performed by: INTERNAL MEDICINE

## 2023-03-01 PROCEDURE — 2500000003 HC RX 250 WO HCPCS: Performed by: PHYSICIAN ASSISTANT

## 2023-03-01 PROCEDURE — 6370000000 HC RX 637 (ALT 250 FOR IP): Performed by: ORTHOPAEDIC SURGERY

## 2023-03-01 PROCEDURE — 97535 SELF CARE MNGMENT TRAINING: CPT

## 2023-03-01 PROCEDURE — 6360000002 HC RX W HCPCS: Performed by: PHYSICIAN ASSISTANT

## 2023-03-01 PROCEDURE — 2500000003 HC RX 250 WO HCPCS: Performed by: SURGERY

## 2023-03-01 PROCEDURE — 80048 BASIC METABOLIC PNL TOTAL CA: CPT

## 2023-03-01 PROCEDURE — 1210000000 HC MED SURG R&B

## 2023-03-01 PROCEDURE — 85027 COMPLETE CBC AUTOMATED: CPT

## 2023-03-01 RX ORDER — OXYCODONE HYDROCHLORIDE 5 MG/1
5 TABLET ORAL EVERY 4 HOURS PRN
Status: DISCONTINUED | OUTPATIENT
Start: 2023-03-01 | End: 2023-03-02 | Stop reason: HOSPADM

## 2023-03-01 RX ORDER — METOPROLOL TARTRATE 5 MG/5ML
5 INJECTION INTRAVENOUS ONCE
Status: COMPLETED | OUTPATIENT
Start: 2023-03-01 | End: 2023-03-01

## 2023-03-01 RX ADMIN — METOPROLOL TARTRATE 5 MG: 5 INJECTION, SOLUTION INTRAVENOUS at 13:05

## 2023-03-01 RX ADMIN — ACETAMINOPHEN 650 MG: 325 TABLET ORAL at 10:28

## 2023-03-01 RX ADMIN — METOPROLOL TARTRATE 5 MG: 5 INJECTION, SOLUTION INTRAVENOUS at 01:00

## 2023-03-01 RX ADMIN — SENNOSIDES AND DOCUSATE SODIUM 1 TABLET: 50; 8.6 TABLET ORAL at 20:36

## 2023-03-01 RX ADMIN — POTASSIUM BICARBONATE 40 MEQ: 782 TABLET, EFFERVESCENT ORAL at 13:31

## 2023-03-01 RX ADMIN — ENOXAPARIN SODIUM 30 MG: 100 INJECTION SUBCUTANEOUS at 10:29

## 2023-03-01 RX ADMIN — ENOXAPARIN SODIUM 30 MG: 100 INJECTION SUBCUTANEOUS at 20:37

## 2023-03-01 RX ADMIN — Medication 10 ML: at 20:38

## 2023-03-01 RX ADMIN — Medication 150 MG: at 10:31

## 2023-03-01 RX ADMIN — ACETAMINOPHEN 650 MG: 325 TABLET ORAL at 18:47

## 2023-03-01 RX ADMIN — OXYCODONE HYDROCHLORIDE AND ACETAMINOPHEN 500 MG: 500 TABLET ORAL at 10:33

## 2023-03-01 RX ADMIN — DONEPEZIL HYDROCHLORIDE 5 MG: 5 TABLET, FILM COATED ORAL at 10:30

## 2023-03-01 RX ADMIN — QUETIAPINE FUMARATE 12.5 MG: 25 TABLET ORAL at 20:36

## 2023-03-01 RX ADMIN — Medication 1000 UNITS: at 10:29

## 2023-03-01 RX ADMIN — MAGNESIUM HYDROXIDE 30 ML: 1200 LIQUID ORAL at 10:30

## 2023-03-01 RX ADMIN — Medication 10 ML: at 10:31

## 2023-03-01 RX ADMIN — METHOCARBAMOL 750 MG: 750 TABLET ORAL at 10:29

## 2023-03-01 RX ADMIN — METHOCARBAMOL 750 MG: 750 TABLET ORAL at 20:38

## 2023-03-01 RX ADMIN — METHOCARBAMOL 750 MG: 750 TABLET ORAL at 13:04

## 2023-03-01 RX ADMIN — METHOCARBAMOL 750 MG: 750 TABLET ORAL at 18:47

## 2023-03-01 ASSESSMENT — PAIN DESCRIPTION - ORIENTATION
ORIENTATION: LEFT
ORIENTATION: RIGHT

## 2023-03-01 ASSESSMENT — PAIN SCALES - GENERAL
PAINLEVEL_OUTOF10: 3
PAINLEVEL_OUTOF10: 2
PAINLEVEL_OUTOF10: 2
PAINLEVEL_OUTOF10: 3
PAINLEVEL_OUTOF10: 0
PAINLEVEL_OUTOF10: 0

## 2023-03-01 ASSESSMENT — PAIN DESCRIPTION - DESCRIPTORS
DESCRIPTORS: ACHING
DESCRIPTORS: ACHING
DESCRIPTORS: OTHER (COMMENT)
DESCRIPTORS: ACHING
DESCRIPTORS: ACHING

## 2023-03-01 ASSESSMENT — PAIN DESCRIPTION - LOCATION
LOCATION: HIP
LOCATION: LEG;HIP
LOCATION: HIP;LEG
LOCATION: HIP
LOCATION: HIP;LEG

## 2023-03-01 NOTE — PROGRESS NOTES
Physical Therapy Med Surg Daily Treatment Note  Facility/Department: [facility]  [id number]       NAME: Amauri Hopkins  : 1940 (9 y.o.)  MRN: [id number]  CODE STATUS: DNR-CCA    Date of Service: 3/1/2023    Patient Diagnosis(es): Frequent falls [R29.6]  Posterior dislocation of left hip, initial encounter [facility]) [S73.015A]  Dislocation of hip joint prosthesis, initial encounter [facility]) [J47.657Z, [address]   Chief Complaint   Patient presents with    Fall     Patient Active Problem List    Diagnosis Date Noted    Posterior dislocation of left hip ([facility] [address] 2023    Closed displaced fracture of posterior wall of left acetabulum ([facility] [address] 2023    Dislocation of hip joint prosthesis, initial encounter ([facility] [address] 2023    Acute pain due to trauma 2023    Acute postoperative pain 2023    Acute postoperative anemia due to expected blood loss 2023    Osteopenia of left thigh 2023    Fall 2023    Vitamin D deficiency 2022    History of COVID-19 2022    Late onset Alzheimer's dementia without behavioral disturbance ([facility] [address] 2022    Lumbar transverse process fracture, closed, initial encounter ([facility] [address]     Closed fracture of sacrum, initial encounter ([facility] [address] 2022        Past Medical History:   Diagnosis Date    Asymptomatic varicose veins of both lower extremities     Dementia with behavioral disturbance     Elevated blood pressure reading     Full dentures     History of hip fracture     Small vessel disease, cerebrovascular      Past Surgical History:   Procedure Laterality Date    HIP FRACTURE SURGERY  2012    HIP SURGERY Left 2023    LEFT HIP HEMIARTHROPLASTY, PROPHYLACTIC STABILIZATION OF LEFT FEMUR performed by Drea Burrows MD at [address] [facility] Left 2023    REVISON LEFT HIP ARTHROPLASTY TO TOTAL HIP ARTHROPLASTY performed by Drea Burrows MD at [facility] risk. Tdwb left leg. Posterior hip precautions. SUBJECTIVE:   Subjective: \"My lunch was very good\"    Pain  Pain: pain with movement, not at rest. niece present. OBJECTIVE:   Orientation  Overall Orientation Status: Impaired  Orientation Level: Oriented to person;Oriented to situation  Cognition  Overall Cognitive Status: Exceptions  Arousal/Alertness: Delayed responses to stimuli  Following Commands: Inconsistently follows commands  Attention Span: Difficulty attending to directions; Difficulty dividing attention  Memory: Decreased recall of precautions;Decreased recall of recent events;Decreased short term memory;Decreased long term memory  Safety Judgement: Decreased awareness of need for safety;Decreased awareness of need for assistance  Insights: Not aware of deficits  Initiation: Requires cues for all  Sequencing: Requires cues for all  Cognition Comment: Poor sequencing and safety    Bed Mobility Training  Bed Mobility Training: Yes  Overall Level of Assistance: Moderate assistance;Assist X2  Interventions: Safety awareness training; Tactile cues; Verbal cues  Supine to Sit: Moderate assistance;Assist X2  Sit to Supine: Moderate assistance;Assist X2  Scooting: Moderate assistance    Transfer Training  Transfer Training: Yes  Overall Level of Assistance: Total assistance  Interventions: Safety awareness training; Tactile cues; Verbal cues  Sit to Stand: Total assistance  Stand to Sit: Total assistance  Stand Pivot Transfers: Total assistance  Bed to Chair: Total assistance  3 trials of stand pivot to get pt safely from chair to bed. Pt very fearful and resisted therapist trying to move her to bed. 3rd trial was successful. Vitals  SpO2: 95 %  O2 Device: None (Room air)          ASSESSMENT pt very fatigued but was agreeable to get back to bed. Etienne present and said pt seemed to tolerate being up all morning in the chair without complaint. Etienne reports pt is very cooperative today versus yesterday. Discharge Recommendations:  Continue to assess pending progress, Patient would benefit from continued therapy after discharge         Goals  Long Term Goals  Long Term Goal 1: Pt to complete rolling L<>R in supine with Zina  Long Term Goal 2: Pt to complete sit<>supine with Zina  Long Term Goal 3: Pt to tolerate sitting EOB maintaining midline unsupported wiht SBA  Long Term Goal 4: Pt to complete HEP with CGA    PLAN    General Plan:  (1-2 visits daily as tolerated)        [facility] (6 CLICK) BASIC MOBILITY  AM-PAC Inpatient Mobility Raw Score : 8     Therapy Time   Individual   Time In  1315   Time Out 1338   Minutes 23   23 minutes bed mobility and transfers          [facility] ALCIDES KENNEDY PTA, 03/01/23 at 1:36 PM         Definitions for assistance levels  Independent = pt does not require any physical supervision or assistance from another person for activity completion. Device may be needed.   Stand by assistance = pt requires verbal cues or instructions from another person, close to but not touching, to perform the activity  Minimal assistance= pt performs 75% or more of the activity; assistance is required to complete the activity  Moderate assistance= pt performs 50% of the activity; assistance is required to complete the activity  Maximal assistance = pt performs 25% of the activity; assistance is required to complete the activity  Dependent = pt requires total physical assistance to accomplish the task

## 2023-03-01 NOTE — PROGRESS NOTES
Comprehensive Nutrition Assessment    Type and Reason for Visit:  Initial, Positive Nutrition Screen, Wound    Nutrition Recommendations/Plan:   Continue Current Diet, Start Oral Nutrition Supplement     Malnutrition Assessment:  Malnutrition Status: At risk for malnutrition (Comment) (03/01/23 1513)    Context:  Acute Illness     Findings of the 6 clinical characteristics of malnutrition:  Energy Intake:  75% or less of estimated energy requirements for 7 or more days  Weight Loss:  Unable to assess     Body Fat Loss:  No significant body fat loss     Muscle Mass Loss:  No significant muscle mass loss    Fluid Accumulation:  Unable to assess     Strength:  Not Performed    Nutrition Assessment:    Pt presents with decreased appetite/intake x~2 weeks per pt's daughter and with increased nutrient needs for wound healing. To provide high calorie/high protein supplement tid with meals. To continue to follow. Nutrition Related Findings:    PMH-dementia, recent hip surgery(2/12)/ revision (2/27); adm s/p fall. Meds/labs reviewed. +1 LLE edema noted. Wound Type: Stage III (L heel)       Current Nutrition Intake & Therapies:    Average Meal Intake: 26-50%, 51-75%  ADULT DIET;  Regular    Anthropometric Measures:  Height: 5' 2\" (157.5 cm)  Ideal Body Weight (IBW): 110 lbs (50 kg)    Admission Body Weight: 136 lb 11.2 oz (62 kg) (unknown weight source; please re-weigh pt after bed is zeroed)  Current BMI (kg/m2):  25  Usual Body Weight: 130 lb (59 kg) (stated 12/2022 & 2/2023)                       BMI Categories: Normal Weight (BMI 22.0 to 24.9) age over 72    Estimated Daily Nutrient Needs:  Energy Requirements Based On: Kcal/kg  Weight Used for Energy Requirements: Admission  Energy (kcal/day): 8519-9957 (kg x 24-26)  Weight Used for Protein Requirements: Ideal  Protein (g/day): 65-75 (kg x 1.3-1.5)  Method Used for Fluid Requirements: 1 ml/kcal  Fluid (ml/day):      Nutrition Diagnosis:   Inadequate oral intake related to cognitive or neurological impairment (advanced age, recent injury/surgery) as evidenced by poor intake prior to admission (intake <50%)  Increased nutrient needs related to increase demand for energy/nutrients as evidenced by wounds    Nutrition Interventions:   Food and/or Nutrient Delivery: Continue Current Diet, Start Oral Nutrition Supplement  Nutrition Education/Counseling: Education not indicated  Coordination of Nutrition Care: Continue to monitor while inpatient       Goals:     Goals: PO intake 50% or greater (improved wound status)       Nutrition Monitoring and Evaluation:      Food/Nutrient Intake Outcomes: Food and Nutrient Intake, Supplement Intake  Physical Signs/Symptoms Outcomes: Skin, Weight, Biochemical Data    Discharge Planning:    Continue Oral Nutrition Supplement     Ayaka Viera RD, LD

## 2023-03-01 NOTE — CARE COORDINATION
LSW spoke with Tierra Shultz and she confirmed that [facility] has accepted the pt and precert was started today. Pt has new onset A-fib with RVR so cardiology will need to see the pt. No DC planned for today.

## 2023-03-01 NOTE — PROGRESS NOTES
Chief Complaint   Patient presents with    Fall          Patient is a 80 y.o. female who presents with a chief complaint of fall and dislocated hip. . Patient is followed on a regular basis by Dr. Shay Dueñas MD. patient with a recent hip fracture status post surgery now presents with fall and dislocation requiring surgical intervention. Patient underwent surgery yesterday without any perioperative events. She denies any chest pain chest pressure heaviness. Does have history of PSVT from previous admission treated medically. She is DNR CCA. No recent cardiac testing. No previous echo or stress test on chart. 3-1-23: had an episode of ST in 140's, no definitive afib. NSR on tele. Now  Resting comfortably. No CP or SOB.         Past Medical History        Past Medical History:   Diagnosis Date    Asymptomatic varicose veins of both lower extremities      Dementia with behavioral disturbance      Elevated blood pressure reading      Full dentures      History of hip fracture 2012    Small vessel disease, cerebrovascular               Patient Active Problem List   Diagnosis    Closed fracture of sacrum, initial encounter ([address]    Lumbar transverse process fracture, closed, initial encounter ([address]    History of COVID-19    Late onset Alzheimer's dementia without behavioral disturbance ([address]    Vitamin D deficiency    Osteopenia of left thigh    Fall    Acute pain due to trauma    Acute postoperative pain    Acute postoperative anemia due to expected blood loss    Dislocation of hip joint prosthesis, initial encounter ([address]    Closed displaced fracture of posterior wall of left acetabulum [facility])         Past Surgical History         Past Surgical History:   Procedure Laterality Date    HIP FRACTURE SURGERY   2012    HIP SURGERY Left 2/12/2023     LEFT HIP HEMIARTHROPLASTY, PROPHYLACTIC STABILIZATION OF LEFT FEMUR performed by Enos Spurling, MD at [address] [place] [address] 2/27/2023 REVISON LEFT HIP ARTHROPLASTY TO TOTAL HIP ARTHROPLASTY performed by Angelic Fair MD at [address] [facility] History   Social History            Socioeconomic History    Marital status:        Spouse name: None    Number of children: None    Years of education: None    Highest education level: None   Tobacco Use    Smoking status: Unknown    Smokeless tobacco: Never   Vaping Use    Vaping Use: Never used   Social History Narrative     No children     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 living [facility]      Social Determinants of Health          Physical Activity: Insufficiently Active    Days of Exercise per Week: 2 days    Minutes of Exercise per Session: 30 min            Family History   Family History   Family history unknown:  Yes            Current Facility-Administered Medications             Current Facility-Administered Medications   Medication Dose Route Frequency Provider Last Rate Last Admin    pantoprazole (PROTONIX) tablet 20 mg  20 mg Oral QAM AC Mukesh Grimes MD   20 mg at 02/28/23 1207    ascorbic acid (VITAMIN C) tablet 500 mg  500 mg Oral Daily Mukesh Grimes MD   500 mg at 02/28/23 1206    iron polysaccharides (NIFEREX) capsule 150 mg  150 mg Oral Daily Mukesh Grimes MD   150 mg at 02/28/23 1206    acetaminophen (TYLENOL) tablet 650 mg  650 mg Oral Q4H [address] [facility] Book   650 mg at 02/28/23 1207    methocarbamol (ROBAXIN) tablet 750 mg  750 mg Oral 4x Daily Catherine Jones PA-C   750 mg at 02/28/23 1207    lidocaine 4 % external patch 2 patch  2 patch TransDERmal Daily Eva Mcfarland   2 patch at 02/28/23 1202    oxyCODONE (ROXICODONE) immediate release tablet 5 mg  5 mg Oral Q4H PRN Catherine Jones PA-C   5 mg at 02/28/23 2058     Or    oxyCODONE (ROXICODONE) immediate release tablet 10 mg  10 mg Oral Q4H PRN Nathan De La Vega        HYDROmorphone (DILAUDID) injection 0.25 mg  0.25 mg IntraVENous Q4H PRN El Larson PA-C        donepezil (ARICEPT) tablet 5 mg  5 mg Oral Daily Hugo Jurado [place], [place]   5 mg at 02/28/23 1207    enoxaparin Sodium (LOVENOX) injection 30 mg  30 mg SubCUTAneous BID El Larson PA-C   30 mg at 02/28/23 1202    QUEtiapine (SEROQUEL) tablet 12.5 mg  12.5 mg Oral Nightly El Larson PA-C   12.5 mg at 02/27/23 2047    Vitamin D (CHOLECALCIFEROL) tablet 1,000 Units  1,000 Units Oral Daily Nathan De La Vega   1,000 Units at 02/28/23 1050    sodium chloride flush 0.9 % injection 10 mL  10 mL IntraVENous 2 times per day El Larson PA-C   10 mL at 02/27/23 2047    sodium chloride flush 0.9 % injection 10 mL  10 mL IntraVENous PRN El Larson PA-C        0.9 % sodium chloride infusion   IntraVENous PRN El Larson PA-C        sennosides-docusate sodium (SENOKOT-S) 8.6-50 MG tablet 1 tablet  1 tablet Oral BID Nathan De La Vega   1 tablet at 02/28/23 1206    magnesium hydroxide (MILK OF MAGNESIA) 400 MG/5ML suspension 30 mL  30 mL Oral Daily PRN El Larson PA-C        bisacodyl (DULCOLAX) suppository 10 mg  10 mg Rectal Daily PRN El Larson PA-C   10 mg at 02/27/23 1049    lactated ringers IV soln infusion   IntraVENous Continuous El Larson PA-C 100 mL/hr at 02/27/23 1340 New Bag at 02/27/23 1442            ALLERGIES: Bactrim [sulfamethoxazole-trimethoprim], Bee venom, Ceftin [cefuroxime], and Wasp venom     Review of Systems   Constitutional: Negative. Negative for chills and fever. HENT: Negative. Eyes: Negative. Respiratory:  Negative for shortness of breath and wheezing. Cardiovascular:  Negative for chest pain, palpitations and leg swelling. Gastrointestinal: Negative. Negative for abdominal pain, nausea and vomiting. Endocrine: Negative. Genitourinary: Negative. Musculoskeletal: Negative. Skin: Negative. Negative for rash. Allergic/Immunologic: Negative. Neurological: Negative. Negative for dizziness, weakness and headaches. Hematological: Negative. Psychiatric/Behavioral: Negative. VITALS:  Blood pressure (!) 123/48, pulse (!) 108, temperature 97.7 °F (36.5 °C), temperature source Oral, resp. rate 16, weight 136 lb 11.2 oz (62 kg), SpO2 100 %. Body mass index is 25 kg/m². Physical Exam  Vitals and nursing note reviewed. Constitutional:       Appearance: She is well-developed. She is not diaphoretic. HENT:      Head: Normocephalic and atraumatic. Eyes:      Pupils: Pupils are equal, round, and reactive to light. Neck:      Thyroid: No thyromegaly. Vascular: No JVD. Trachea: No tracheal deviation. Cardiovascular:      Rate and Rhythm: Normal rate and regular rhythm. Chest Wall: PMI is not displaced. Pulses: Intact distal pulses. Heart sounds: Normal heart sounds. Heart sounds not distant. No murmur heard. No friction rub. No gallop. No S3 sounds. Pulmonary:      Effort: No respiratory distress. Breath sounds: No wheezing or rales. Chest:      Chest wall: No tenderness. Abdominal:      General: Bowel sounds are normal. There is no distension. Palpations: Abdomen is soft. There is no mass. Tenderness: There is no abdominal tenderness. There is no guarding or rebound. Musculoskeletal:         General: Normal range of motion. Cervical back: Normal range of motion and neck supple. Skin:     General: Skin is warm and dry. Coloration: Skin is not pale. Findings: No erythema or rash. Neurological:      Mental Status: She is alert and oriented to person, place, and time. Cranial Nerves: No cranial nerve deficit.    Psychiatric:         Behavior: Behavior normal. Thought Content:  Thought content normal.         Judgment: Judgment normal.         LABS:  Recent Results         Recent Results (from the past 24 hour(s))   CBC with Auto Differential     Collection Time: 02/28/23  4:53 AM   Result Value Ref Range     WBC 8.0 4.8 - 10.8 K/uL     RBC 3.17 (L) 4.20 - 5.40 M/uL     Hemoglobin 8.4 (L) 12.0 - 16.0 g/dL     Hematocrit 26.1 (L) 37.0 - 47.0 %     MCV 82.3 79.4 - 94.8 fL     MCH 26.6 (L) 27.0 - 31.3 pg     MCHC 32.3 (L) 33.0 - 37.0 %     RDW 15.8 (H) 11.5 - 14.5 %     Platelets 410 (H) 617 - 400 K/uL     Neutrophils % 68.4 %     Lymphocytes % 16.4 %     Monocytes % 13.7 %     Eosinophils % 1.0 %     Basophils % 0.5 %     Neutrophils Absolute 5.5 1.4 - 6.5 K/uL     Lymphocytes Absolute 1.3 1.0 - 4.8 K/uL     Monocytes Absolute 1.1 (H) 0.2 - 0.8 K/uL     Eosinophils Absolute 0.1 0.0 - 0.7 K/uL     Basophils Absolute 0.0 0.0 - 0.2 K/uL   Basic Metabolic Panel w/ Reflex to MG     Collection Time: 02/28/23  4:53 AM   Result Value Ref Range     Sodium 140 135 - 144 mEq/L     Potassium reflex Magnesium 4.0 3.4 - 4.9 mEq/L     Chloride 106 95 - 107 mEq/L     CO2 23 20 - 31 mEq/L     Anion Gap 11 9 - 15 mEq/L     Glucose 83 70 - 99 mg/dL     BUN 6 (L) 8 - 23 mg/dL     Creatinine 0.61 0.50 - 0.90 mg/dL     Est, Glom Filt Rate >60.0 >60     Calcium 8.2 (L) 8.5 - 9.9 mg/dL   Hemoglobin and Hematocrit     Collection Time: 02/28/23  3:11 PM   Result Value Ref Range     Hemoglobin 7.8 (L) 12.0 - 16.0 g/dL     Hematocrit 23.7 (L) 37.0 - 47.0 %         Troponin:         Lab Results   Component Value Date/Time     TROPONINI <0.10 01/13/2021 12:00 AM         EKG: normal sinus rhythm, nonspecific ST and T waves changes        ASSESSMENT:           Active Hospital Problems     Diagnosis Date Noted    Closed displaced fracture of posterior wall of left acetabulum [facility]) [S32.422A] 02/27/2023       Priority: Medium    Dislocation of hip joint prosthesis, initial encounter ([facility] [F60.575S, [address] 02/26/2023       Priority: Medium    Acute pain due to trauma [G89.11] 02/13/2023       Priority: Medium    Acute postoperative pain [G89.18] 02/13/2023       Priority: Medium    Acute postoperative anemia due to expected blood loss [D62] 02/13/2023       Priority: Medium    Fall [W19. XXXA] 02/12/2023       Priority: Medium    Late onset Alzheimer's dementia without behavioral disturbance ([facility] [G30.1, F02.80] 02/21/2022       Priority: Low         Preoperative clearance-did well with surgery  History of PSVT-currently normal sinus rhythm  No clear evidence of afib. Sinus tach only. PLAN:   Conservative medical therapy from cardiology standpoint. GI/DVT prophylaxis  Maintain potassium between 4-5 and magnesium greater than 2  No plans for any cardiac testing at this time due to DNR CCA.     Electronically signed by Kayleen Olszewski, DO on 3/1/2023 at 4:34 PM

## 2023-03-01 NOTE — CONSULTS
80-year-old female with past medical dementia, history of hip fracture, hypertension, PSVT orthopedic consult me for medical management of her dementia hypertension and pain. Patient is with her POA. Patient denies any needs. Patient had revision of the left arthroplasty for posterior dislocation and with wall fracture of the left acetabulum. Past Medical History:   Diagnosis Date    Asymptomatic varicose veins of both lower extremities     Dementia with behavioral disturbance     Elevated blood pressure reading     Full dentures     History of hip fracture 2012    Small vessel disease, cerebrovascular      Past Surgical History:   Procedure Laterality Date    HIP FRACTURE SURGERY  2012    HIP SURGERY Left 2/12/2023    LEFT HIP HEMIARTHROPLASTY, PROPHYLACTIC STABILIZATION OF LEFT FEMUR performed by Laya Izaguirre MD at [address] Left 2/27/2023    REVISON LEFT HIP ARTHROPLASTY TO TOTAL HIP ARTHROPLASTY performed by Laya Izaguirre MD at [address] [facility] History       Tobacco History       Smoking Status  Unknown      Smokeless Tobacco Use  Never              Alcohol History       Alcohol Use Status  Not Asked              Drug Use       Drug Use Status  Not Asked              Sexual Activity       Sexually Active  Not Asked                  Family History   Family history unknown: Yes     No current facility-administered medications on file prior to encounter.      Current Outpatient Medications on File Prior to Encounter   Medication Sig Dispense Refill    enoxaparin Sodium (LOVENOX) 30 MG/0.3ML injection Inject 0.3 mLs into the skin 2 times daily for 28 days 16.8 mL 0    sennosides-docusate sodium (SENOKOT-S) 8.6-50 MG tablet Take 2 tablets by mouth in the morning and at bedtime for 14 days 56 tablet 0    acetaminophen (TYLENOL) 325 MG tablet Take 2 tablets by mouth every 4 hours as needed for Pain 120 tablet 0    Lactobacillus (ACIDOPHILUS) CAPS capsule Take 1 capsule by mouth 2 times daily      vitamin D3 (CHOLECALCIFEROL) 25 MCG (1000 UT) TABS tablet Take 1,000 Units by mouth daily      ketoconazole (NIZORAL) 2 % cream Apply topically 2 times daily Apply topically daily L foot      donepezil (ARICEPT) 5 MG tablet Take 5 mg by mouth daily       QUEtiapine (SEROQUEL) 25 MG tablet Take 0.5 tablets by mouth daily 30 tablet 0     Lab Results   Component Value Date    WBC 8.1 03/01/2023    HGB 8.8 (L) 03/01/2023    HCT 26.6 (L) 03/01/2023    MCV 82.8 03/01/2023     (H) 03/01/2023     Lab Results   Component Value Date/Time     03/01/2023 10:15 AM    K 3.5 03/01/2023 10:15 AM     03/01/2023 10:15 AM    CO2 24 03/01/2023 10:15 AM    BUN 5 03/01/2023 10:15 AM    CREATININE 0.53 03/01/2023 10:15 AM    GLUCOSE 103 03/01/2023 10:15 AM    CALCIUM ERROR 03/01/2023 10:15 AM      No current facility-administered medications on file prior to encounter.      Current Outpatient Medications on File Prior to Encounter   Medication Sig Dispense Refill    enoxaparin Sodium (LOVENOX) 30 MG/0.3ML injection Inject 0.3 mLs into the skin 2 times daily for 28 days 16.8 mL 0    sennosides-docusate sodium (SENOKOT-S) 8.6-50 MG tablet Take 2 tablets by mouth in the morning and at bedtime for 14 days 56 tablet 0    acetaminophen (TYLENOL) 325 MG tablet Take 2 tablets by mouth every 4 hours as needed for Pain 120 tablet 0    Lactobacillus (ACIDOPHILUS) CAPS capsule Take 1 capsule by mouth 2 times daily      vitamin D3 (CHOLECALCIFEROL) 25 MCG (1000 UT) TABS tablet Take 1,000 Units by mouth daily      ketoconazole (NIZORAL) 2 % cream Apply topically 2 times daily Apply topically daily L foot      donepezil (ARICEPT) 5 MG tablet Take 5 mg by mouth daily       QUEtiapine (SEROQUEL) 25 MG tablet Take 0.5 tablets by mouth daily 30 tablet 0       ROS: 12 point review of system is negative except as in HPI  HEENT: AT/NC, PERRLA, no JVD  HEART: s1/s2 wnl w/o s3, no m.r.g  Neck : supple and midline  LUNG: clear, no wheez  ABD: soft, NT  EXT: no edema  SKin : no rash  Neuro:no focal deficits,alert  Mood: appropoate  1) Alzheimer dementia  2 ) PSVT  3) DVT ppz    Continue with donezepil and Seroquel, pain is controlled. DVT was as per primary team.  Thank you for the consult.   We will follow the patient with you,supportive treatment for Alzheimer's dementia

## 2023-03-01 NOTE — PROGRESS NOTES
TRAUMA DAILY PROGRESS NOTE      Patient Name: Manohar Agee  Admission Date 2023    Hospital Day: 3  Patient seen and examined on 3/1/2023    INTERVAL HISTORY/EVENTS     Background: Manohar Agee is a 80 y.o. female with a PMHx of dementia and recent admission to our service from 23-2/15/23 s/p left hemiarthroplasty with Orthopaedic Surgery (Dr. Asha Jackson) on 2/12/15 presented to [facility] ED on 2023 s/p unwitnessed fall from bed at [facility]. Trauma workup found left posterior hip dislocation of prosthetic acetabulum s/p reduction of posterior dislocation left hip arthroplasty be ED prior to trauma consult evaluation. Hospital Course:  2023:  Presented to ED s/p unwitnessed fall from bed at [facility]. ED with attempted reduction prior to Trauma Consult. Prosthetic hip with partial reduction. Admitted to trauma. Ortho consulted. NPO at midnight for possible OR with Ortho tomorrow. 2023: s/p conversion to left total hip replacement  2023: H/H downtrend. Incidentals discussed. 24 Hour Events:  POD#2 s/p conversion to left total hip replacement. Patient feeling significantly better today. Patient is awake and sitting up in bedside chair. When asked if patient has any hip pain, patient says \"what pain? \". Patient's nieces asks \"what medications did you use this time? She looks so much better than after surgery on prior admission\". Appetite improving and tolerating PO. Voiding spontaneously with external catheter. No BM. Niece at bedside. Labs: no leukocytosis. H/H stable. K+ 3.5. Mg 1.8. Otherwise electrolytes and renal function appropriate    Vitals reviewed: afebrile. Patient tachycardic to 140s overnight. Also noted to be tachycardic to 140s on telemetry this AM. Normotensive. SPO2 95-96% on RA. UOP: x2 occurrences    BM: x0 since admisstion      PHYSICAL EXAM     Vitals Average, Min and Max for last 24 hours:  Temp: Temp: 97.6 °F (36.4 °C);  Temp  Av.1 °F (36.7 °C)  Min: 97.6 °F (36.4 °C)  Max: 98.6 °F (37 °C)  Respirations: Resp  Av  Min: 18  Max: 18  Pulse: Pulse  Av.5  Min: 94  Max: 148  Blood Pressure: Systolic (97IDD), EWK:849 , Min:128 , SCR:397   ; Diastolic (96DIN), XVW:31, Min:50, Max:88  SpO2: SpO2  Av.7 %  Min: 95 %  Max: 96 %    24hr Intake/Output: No intake or output data in the 24 hours ending 23 1730      Vitals: BP (!) 146/50   Pulse 94   Temp 97.6 °F (36.4 °C) (Oral)   Resp 18   Ht 5' 2\" (1.575 m)   Wt 136 lb 11.2 oz (62 kg)   SpO2 95%   BMI 25.00 kg/m²     Physical Exam:  Constitutional: Lying supine in bed. NAD. Resting comfortably. Niece at bedside. HEENT: Atraumatic normocephalic. Cardiovascular: HR 140s, irregular at bedside. DP/PT 2+ BLE  Pulmonary: Breaths unlabored on room air. Abdominal: Soft. Non-distended. Non-tender. Musculoskeletal: LLE aquacel dressing w/o strikethrough. Left thigh soft/compressible. Gross motor/sensation to BLE  Neurological: awake, and orientated x1 (self, niece reports this as baseline). Motor and sensory grossly intact. No focal deficits. GCS of 14 (E4, V4, M6).     LABORATORY RESULTS (LAST 24 HOURS)     CBC with Differential:    Lab Results   Component Value Date/Time    WBC 8.1 2023 10:15 AM    RBC 3.21 2023 10:15 AM    HGB 8.8 2023 10:15 AM    HCT 26.6 2023 10:15 AM     2023 10:15 AM    MCV 82.8 2023 10:15 AM    MCH 27.3 2023 10:15 AM    MCHC 33.0 2023 10:15 AM    RDW 15.9 2023 10:15 AM    LYMPHOPCT 16.4 2023 04:53 AM    MONOPCT 13.7 2023 04:53 AM    EOSPCT 0.0 2021 12:00 AM    BASOPCT 0.5 2023 04:53 AM    MONOSABS 1.1 2023 04:53 AM    LYMPHSABS 1.3 2023 04:53 AM    EOSABS 0.1 2023 04:53 AM    BASOSABS 0.0 2023 04:53 AM     CMP:    Lab Results   Component Value Date/Time     2023 10:15 AM    K 3.5 2023 10:15 AM     2023 10:15 AM    CO2 24 2023 10:15 AM BUN 5 03/01/2023 10:15 AM    CREATININE 0.53 03/01/2023 10:15 AM    GFRAA >60.0 01/22/2022 06:37 AM    LABGLOM >60.0 03/01/2023 10:15 AM    GLUCOSE 103 03/01/2023 10:15 AM    PROT 6.9 02/26/2023 07:15 AM    LABALBU 3.3 02/26/2023 07:15 AM    CALCIUM ERROR 03/01/2023 10:15 AM    BILITOT 0.5 02/26/2023 07:15 AM    ALKPHOS 104 02/26/2023 07:15 AM    AST 26 02/26/2023 07:15 AM    ALT 27 02/26/2023 07:15 AM     Magnesium:    Lab Results   Component Value Date/Time    MG 1.8 03/01/2023 10:15 AM     PT/INR:    Lab Results   Component Value Date/Time    PROTIME 14.4 02/26/2023 07:15 AM    INR 1.1 02/26/2023 07:15 AM     PTT:    Lab Results   Component Value Date/Time    APTT 34.3 02/26/2023 07:15 AM       IMAGING RESULTS (PERSONALLY REVIEWED)     All admission and follow up imaging reviewed. ASSESSMENT & PLAN     Diagnoses:  S/p fall at SNF on 2/26/23  Posterior dislocation of left hip  Dislocation of hip joint prosthesis  Acute pain due to trauma  Acute post-operative pain  Acute blood loss anemia  Tachycardia    PMHx: dementia and recent admission to our service from 2/11/23-2/15/23 s/p left hemiarthroplasty    Incidental Findings: Discussed by RAL, 2/28/2023  There is a large cervical cyst identified at 5.3 cm which can be further characterized with ultrasound if clinically indicated. ASSESSMENT/PLAN:  Neurological: Acute pain due to trauma, acute post-op pain. Hx dementia  - Continue Tylenol 650mg q6hr scheduled  - Continue robaxin 750mg QID  - Continue lidocaine patch x2  - Continue Oxycodone 5mg q4hr prn mod/severe pain  - Continue home donepezil 5mg daily  - Continue home seroquel 12.5mg daily     Cardiovascular: Patient did have several episodes of self limiting SVT last admission, family at the time opted to not treat given DNR-CCA status. Patient with HR in 140s overnight and this AM. Received x2 doses of metoprolol 5mg (overnight, this AM). HR now in 90s.  Concern for A-fib RVR overnight.  - Vital signs per unit protocol  - Continue cardiac telemetry  - Cardiology: no definitive atrial fibrillation on telemetry. Episode ST in 140s. No plans for cardiac testing at this time. Respiratory: Sating appropriately on RA  - Maintain O2 sats > 92%  - Encourage IS 10x hourly     GI/Diet: No acute issues  - Continue Regular diet   - Bowel regimen: senokot BID, miralax daily     Renal/Electrolytes: K+ 3.5, Mg 1.8, BUN/Cr within normal limits on last BMP  - HLIV  - AM BMP  - Effer-K 40meq x1 dose today     ID: No leukocytosis, afebrile  - Liliana-op abx completed     Heme: Down trend in H&H  - No indication for transfusion  - AM CBC     Endocrine: No acute or chronic concerns      MSK: Left posterior hip dislocation of prosthetic acetabulum s/p reduction of posterior dislocation left hip arthroplasty be ED prior to trauma consult evaluation (POD #2 s/p conversion to left total hip replacement). s/p unwitnessed fall from bed at [facility] 2/26. s/p left hemiarthroplasty with Orthopaedic Surgery (Dr. Marla Paris) on 2/12/23.  - Ortho following  - Spines: Clear  - Weight Bearing Restrictions: TTWB LLE, posterior hip precautions, no pivoting  - Activity: Ambulatory with assistance  - PT/OT: recommend SNF     Prophylaxis:   - SCDs and Lovenox 30mg BID for VTE PPx     Lines/Tubes/Drains:  - Maintain PIVs  - No indication for harris catheter, monitor for urinary retention        Dispo: remain on trauma service for Cardiology final recs. Anticipate medical clearance tomorrow for discharge to SNF if remains HDS. Accom Status: General Status       - Follow up with Orthopedics (Dr. Marla Paris) TBD.  - Follow up with PCP for chronic medical conditions  - No trauma follow up indicated.        Germain Gallardo PA-C  Trauma/Critical Care  [see Treatment Team Sticky Note for contact information]     This patient's plan of care was discussed and made in collaboration with Trauma Attending physician, Erwin Devlin MD.

## 2023-03-01 NOTE — PROGRESS NOTES
Pt , Ekg performed Afib with rvr. Physician notified.  N.O metoprolol 5mg IV once and consult to hospitalist.

## 2023-03-01 NOTE — DISCHARGE INSTRUCTIONS
Toe touch weight bearing to left lower extremity   Aquacel dressing to be removed on postoperative day 7 and incision left open to air (remove 3/6/2023)  If staples are present can be removed pod14 and steri strips applied to incision   Follow up with orthopedic surgeon in two weeks (Dr. Author Kelly)  Continue Lovenox 30mg twice daily for deep vein prophylaxis in lower legs (end date 3/30/23)

## 2023-03-01 NOTE — PROGRESS NOTES
Physical Therapy Med Surg Daily Treatment Note  Facility/Department: [facility]  Room: B422/K479-62       NAME: Monica Toussaint  : 1940 (38 y.o.)  MRN: [id number]  CODE STATUS: DNR-CCA    Date of Service: 3/1/2023    Patient Diagnosis(es): Frequent falls [R29.6]  Posterior dislocation of left hip, initial encounter [facility]) [S73.015A]  Dislocation of hip joint prosthesis, initial encounter [facility]) [T55.669J, [address]   Chief Complaint   Patient presents with    Fall     Patient Active Problem List    Diagnosis Date Noted    Posterior dislocation of left hip ([facility] 2023    Closed displaced fracture of posterior wall of left acetabulum ([address] 2023    Dislocation of hip joint prosthesis, initial encounter ([facility] [address] 2023    Acute pain due to trauma 2023    Acute postoperative pain 2023    Acute postoperative anemia due to expected blood loss 2023    Osteopenia of left thigh 2023    Fall 2023    Vitamin D deficiency 2022    History of COVID-19 2022    Late onset Alzheimer's dementia without behavioral disturbance ([address] 2022    Lumbar transverse process fracture, closed, initial encounter ([facility] [address]     Closed fracture of sacrum, initial encounter ([address] 2022        Past Medical History:   Diagnosis Date    Asymptomatic varicose veins of both lower extremities     Dementia with behavioral disturbance     Elevated blood pressure reading     Full dentures     History of hip fracture     Small vessel disease, cerebrovascular      Past Surgical History:   Procedure Laterality Date    HIP FRACTURE SURGERY  2012    HIP SURGERY Left 2023    LEFT HIP HEMIARTHROPLASTY, PROPHYLACTIC STABILIZATION OF LEFT FEMUR performed by Rona Nunez MD at [address] [place] [address] 2023    REVISON LEFT HIP ARTHROPLASTY TO TOTAL HIP ARTHROPLASTY performed by [facility]ona Nunez MD at [facility] [place] [facility]. Left tdwb. Posterior hip precautions       SUBJECTIVE:   Subjective: \"I will do what I can, it hurts when I think about it\"    Pain  Pain: pain with movement, not at rest. niece present. OBJECTIVE:   Orientation  Overall Orientation Status: Impaired  Orientation Level: Oriented to person;Oriented to situation  Cognition  Overall Cognitive Status: Exceptions  Arousal/Alertness: Delayed responses to stimuli  Following Commands: Inconsistently follows commands  Attention Span: Difficulty attending to directions; Difficulty dividing attention  Memory: Decreased recall of precautions;Decreased recall of recent events;Decreased short term memory;Decreased long term memory  Safety Judgement: Decreased awareness of need for safety;Decreased awareness of need for assistance  Insights: Not aware of deficits  Initiation: Requires cues for all  Sequencing: Requires cues for all  Cognition Comment: Poor sequencing and safety    Bed Mobility Training  Bed Mobility Training: Yes  Overall Level of Assistance: Moderate assistance;Assist X2  Interventions: Safety awareness training; Tactile cues; Verbal cues  Supine to Sit: Moderate assistance  Sit to Supine: Moderate assistance;Assist X2  Scooting: Moderate assistance  Pt able to stay up sitting edge of bed without support > 5 minutes. Transfer Training  Transfer Training: Yes  Overall Level of Assistance: Total assistance  Interventions: Safety awareness training; Tactile cues; Verbal cues  Sit to Stand: Total assistance  Stand to Sit: Total assistance  Stand Pivot Transfers: Total assistance  Bed to Chair: Total assistance                        PT Exercises  Exercise Treatment: seated laq x 5 bilateral. ankle pumps x 5. pt needed constant redirection to completed tasks. Vitals  SpO2: 96 %  O2 Device: None (Room air)          ASSESSMENT pt very agreeable to participate and niece was present. Pt agreeable to get into chair with assistance.  Pt unable to stand up safely without maintaining weight bearing restriction at this. Safest to pivot transfer pt to chair and not attempt to use ww. Discharge Recommendations:  Continue to assess pending progress, Patient would benefit from continued therapy after discharge         Goals  Long Term Goals  Long Term Goal 1: Pt to complete rolling L<>R in supine with Zina  Long Term Goal 2: Pt to complete sit<>supine with Zina  Long Term Goal 3: Pt to tolerate sitting EOB maintaining midline unsupported wiht SBA  Long Term Goal 4: Pt to complete HEP with CGA    PLAN    General Plan:  (1-2 visits daily as tolerated)        [facility] (6 CLICK) BASIC MOBILITY  AM-PAC Inpatient Mobility Raw Score : 8     Therapy Time   Individual   Time In 0910   Time Out 0940   Minutes 30   10 minutes therex  20 minutes bed mob/transfers          [facility] ALCIDES KENNEDY PTA, 03/01/23 at 9:46 AM         Definitions for assistance levels  Independent = pt does not require any physical supervision or assistance from another person for activity completion. Device may be needed.   Stand by assistance = pt requires verbal cues or instructions from another person, close to but not touching, to perform the activity  Minimal assistance= pt performs 75% or more of the activity; assistance is required to complete the activity  Moderate assistance= pt performs 50% of the activity; assistance is required to complete the activity  Maximal assistance = pt performs 25% of the activity; assistance is required to complete the activity  Dependent = pt requires total physical assistance to accomplish the task

## 2023-03-01 NOTE — CONSULTS
Past Medical History:   Diagnosis Date    Asymptomatic varicose veins of both lower extremities     Dementia with behavioral disturbance     Elevated blood pressure reading     Full dentures     History of hip fracture 2012    Small vessel disease, cerebrovascular      . gonzalez

## 2023-03-02 VITALS
BODY MASS INDEX: 24.64 KG/M2 | HEIGHT: 62 IN | OXYGEN SATURATION: 94 % | WEIGHT: 133.9 LBS | TEMPERATURE: 98.1 F | RESPIRATION RATE: 18 BRPM | SYSTOLIC BLOOD PRESSURE: 139 MMHG | HEART RATE: 85 BPM | DIASTOLIC BLOOD PRESSURE: 52 MMHG

## 2023-03-02 LAB
ANION GAP SERPL CALCULATED.3IONS-SCNC: 9 MEQ/L (ref 9–15)
BUN BLDV-MCNC: 7 MG/DL (ref 8–23)
CALCIUM SERPL-MCNC: 7.9 MG/DL (ref 8.5–9.9)
CHLORIDE BLD-SCNC: 107 MEQ/L (ref 95–107)
CO2: 25 MEQ/L (ref 20–31)
CREAT SERPL-MCNC: 0.62 MG/DL (ref 0.5–0.9)
GFR SERPL CREATININE-BSD FRML MDRD: >60 ML/MIN/{1.73_M2}
GLUCOSE BLD-MCNC: 85 MG/DL (ref 70–99)
HCT VFR BLD CALC: 24.4 % (ref 37–47)
HEMOGLOBIN: 7.9 G/DL (ref 12–16)
MAGNESIUM: 1.9 MG/DL (ref 1.7–2.4)
MCH RBC QN AUTO: 26.6 PG (ref 27–31.3)
MCHC RBC AUTO-ENTMCNC: 32.4 % (ref 33–37)
MCV RBC AUTO: 82.2 FL (ref 79.4–94.8)
PDW BLD-RTO: 16.4 % (ref 11.5–14.5)
PLATELET # BLD: 412 K/UL (ref 130–400)
POTASSIUM REFLEX MAGNESIUM: 3.4 MEQ/L (ref 3.4–4.9)
RBC # BLD: 2.97 M/UL (ref 4.2–5.4)
SODIUM BLD-SCNC: 141 MEQ/L (ref 135–144)
WBC # BLD: 6 K/UL (ref 4.8–10.8)

## 2023-03-02 PROCEDURE — 99239 HOSP IP/OBS DSCHRG MGMT >30: CPT | Performed by: PHYSICIAN ASSISTANT

## 2023-03-02 PROCEDURE — 99232 SBSQ HOSP IP/OBS MODERATE 35: CPT | Performed by: INTERNAL MEDICINE

## 2023-03-02 PROCEDURE — 6360000002 HC RX W HCPCS: Performed by: PHYSICIAN ASSISTANT

## 2023-03-02 PROCEDURE — 36415 COLL VENOUS BLD VENIPUNCTURE: CPT

## 2023-03-02 PROCEDURE — 97535 SELF CARE MNGMENT TRAINING: CPT

## 2023-03-02 PROCEDURE — 83735 ASSAY OF MAGNESIUM: CPT

## 2023-03-02 PROCEDURE — 6370000000 HC RX 637 (ALT 250 FOR IP): Performed by: PHYSICIAN ASSISTANT

## 2023-03-02 PROCEDURE — 85027 COMPLETE CBC AUTOMATED: CPT

## 2023-03-02 PROCEDURE — 80048 BASIC METABOLIC PNL TOTAL CA: CPT

## 2023-03-02 PROCEDURE — 6370000000 HC RX 637 (ALT 250 FOR IP): Performed by: ORTHOPAEDIC SURGERY

## 2023-03-02 PROCEDURE — 2580000003 HC RX 258: Performed by: PHYSICIAN ASSISTANT

## 2023-03-02 PROCEDURE — 6370000000 HC RX 637 (ALT 250 FOR IP): Performed by: INTERNAL MEDICINE

## 2023-03-02 RX ORDER — ASCORBIC ACID 500 MG
500 TABLET ORAL DAILY
Qty: 30 TABLET | Refills: 0 | DISCHARGE
Start: 2023-03-03 | End: 2023-04-02

## 2023-03-02 RX ORDER — OXYCODONE HYDROCHLORIDE 5 MG/1
5 TABLET ORAL EVERY 4 HOURS PRN
Qty: 16 TABLET | Refills: 0 | Status: SHIPPED | OUTPATIENT
Start: 2023-03-02 | End: 2023-03-06

## 2023-03-02 RX ORDER — SENNA AND DOCUSATE SODIUM 50; 8.6 MG/1; MG/1
1 TABLET, FILM COATED ORAL 2 TIMES DAILY
Qty: 28 TABLET | Refills: 0 | DISCHARGE
Start: 2023-03-02 | End: 2023-03-16

## 2023-03-02 RX ORDER — LIDOCAINE 4 G/G
2 PATCH TOPICAL DAILY
Qty: 28 EACH | Refills: 0 | DISCHARGE
Start: 2023-03-03 | End: 2023-03-17

## 2023-03-02 RX ORDER — METHOCARBAMOL 750 MG/1
750 TABLET, FILM COATED ORAL 4 TIMES DAILY
Qty: 40 TABLET | Refills: 0 | DISCHARGE
Start: 2023-03-02 | End: 2023-03-12

## 2023-03-02 RX ORDER — ENOXAPARIN SODIUM 100 MG/ML
30 INJECTION SUBCUTANEOUS 2 TIMES DAILY
Qty: 16.8 ML | Refills: 0 | DISCHARGE
Start: 2023-03-02 | End: 2023-03-30

## 2023-03-02 RX ADMIN — Medication 10 ML: at 09:29

## 2023-03-02 RX ADMIN — ACETAMINOPHEN 650 MG: 325 TABLET ORAL at 00:28

## 2023-03-02 RX ADMIN — METHOCARBAMOL 750 MG: 750 TABLET ORAL at 12:58

## 2023-03-02 RX ADMIN — PANTOPRAZOLE SODIUM 20 MG: 20 TABLET, DELAYED RELEASE ORAL at 06:29

## 2023-03-02 RX ADMIN — ACETAMINOPHEN 650 MG: 325 TABLET ORAL at 15:30

## 2023-03-02 RX ADMIN — POTASSIUM BICARBONATE 40 MEQ: 782 TABLET, EFFERVESCENT ORAL at 14:00

## 2023-03-02 RX ADMIN — Medication 1000 UNITS: at 09:29

## 2023-03-02 RX ADMIN — ACETAMINOPHEN 650 MG: 325 TABLET ORAL at 06:29

## 2023-03-02 RX ADMIN — ENOXAPARIN SODIUM 30 MG: 100 INJECTION SUBCUTANEOUS at 09:31

## 2023-03-02 RX ADMIN — SENNOSIDES AND DOCUSATE SODIUM 1 TABLET: 50; 8.6 TABLET ORAL at 09:29

## 2023-03-02 RX ADMIN — ACETAMINOPHEN 650 MG: 325 TABLET ORAL at 03:39

## 2023-03-02 RX ADMIN — METHOCARBAMOL 750 MG: 750 TABLET ORAL at 09:29

## 2023-03-02 RX ADMIN — DONEPEZIL HYDROCHLORIDE 5 MG: 5 TABLET, FILM COATED ORAL at 09:29

## 2023-03-02 RX ADMIN — OXYCODONE HYDROCHLORIDE AND ACETAMINOPHEN 500 MG: 500 TABLET ORAL at 09:29

## 2023-03-02 RX ADMIN — Medication 150 MG: at 09:30

## 2023-03-02 RX ADMIN — METHOCARBAMOL 750 MG: 750 TABLET ORAL at 17:25

## 2023-03-02 ASSESSMENT — PAIN SCALES - GENERAL
PAINLEVEL_OUTOF10: 2
PAINLEVEL_OUTOF10: 2
PAINLEVEL_OUTOF10: 4

## 2023-03-02 NOTE — DISCHARGE SUMMARY
[address]  [facility] [address]  [place], [address] [place] [address]  MRN: [id number]  YOB: 1940  80 y. o.female      Attending  Jose Elias Becerra MD ?   Date of Admission  2/26/2023 Date of Discharge  3/2/2023      ? DIAGNOSES:  Principal Problem:    Dislocation of hip joint prosthesis, initial encounter [facility])  Active Problems:    Posterior dislocation of left hip (HCC)    Fall    Acute pain due to trauma    Acute postoperative pain    Acute postoperative anemia due to expected blood loss    Closed displaced fracture of posterior wall of left acetabulum (HCC)    Late onset Alzheimer's dementia without behavioral disturbance ([facility]  Resolved Problems:    * No resolved hospital problems. *         PROCEDURES:  3/2/2023: s/p revision left hip arthroplasty with Orthopedic Surgery (Dr. Stephanie Ronquillo)  ?     DISCHARGE MEDICATIONS:  Current Discharge Medication List             Details   enoxaparin Sodium (LOVENOX) 30 MG/0.3ML injection Inject 0.3 mLs into the skin 2 times daily for 28 days  Qty: 16.8 mL, Refills: 0      acetaminophen (TYLENOL) 325 MG tablet Take 2 tablets by mouth every 4 hours as needed for Pain  Qty: 120 tablet, Refills: 0      Lactobacillus (ACIDOPHILUS) CAPS capsule Take 1 capsule by mouth 2 times daily      vitamin D3 (CHOLECALCIFEROL) 25 MCG (1000 UT) TABS tablet Take 1,000 Units by mouth daily      ketoconazole (NIZORAL) 2 % cream Apply topically 2 times daily Apply topically daily L foot      donepezil (ARICEPT) 5 MG tablet Take 5 mg by mouth daily       QUEtiapine (SEROQUEL) 25 MG tablet Take 0.5 tablets by mouth daily  Qty: 30 tablet, Refills: 0               Current Facility-Administered Medications:     potassium bicarb-citric acid (EFFER-K) effervescent tablet 40 mEq, 40 mEq, Oral, Once, Virgle [place], DO    oxyCODONE (ROXICODONE) immediate release tablet 5 mg, 5 mg, Oral, Q4H PRN **OR** [DISCONTINUED] oxyCODONE (ROXICODONE) immediate release tablet 10 mg, 10 mg, Oral, Q4H PRN, Joy Alvarado PA-C    pantoprazole (PROTONIX) tablet 20 mg, 20 mg, Oral, QAM AC, Landon Hopson MD, 20 mg at 03/02/23 2394    ascorbic acid (VITAMIN C) tablet 500 mg, 500 mg, Oral, Daily, Landon Hopson MD, 500 mg at 03/02/23 0698    iron polysaccharides (NIFEREX) capsule 150 mg, 150 mg, Oral, Daily, Landon Hopson MD, 150 mg at 03/02/23 0930    acetaminophen (TYLENOL) tablet 650 mg, 650 mg, Oral, Q4H, [facility], 650 mg at 03/02/23 7726    methocarbamol (ROBAXIN) tablet 750 mg, 750 mg, Oral, 4x Daily, [address] [facility] [place], PA-C, 750 mg at 03/02/23 1258    lidocaine 4 % external patch 2 patch, 2 patch, TransDERmal, Daily, Joy Alvarado PA-C, 2 patch at 03/02/23 0930    donepezil (ARICEPT) tablet 5 mg, 5 mg, Oral, Daily, [facility], 5 mg at 03/02/23 0929    enoxaparin Sodium (LOVENOX) injection 30 mg, 30 mg, SubCUTAneous, BID, Joy Alvarado PA-C, 30 mg at 03/02/23 7727    QUEtiapine (SEROQUEL) tablet 12.5 mg, 12.5 mg, Oral, Nightly, [address] [facility] [place], PA-C, 12.5 mg at 03/01/23 2036    Vitamin D (CHOLECALCIFEROL) tablet 1,000 Units, 1,000 Units, Oral, Daily, Joy Alvarado PA-C, 1,000 Units at 03/02/23 7546    sodium chloride flush 0.9 % injection 10 mL, 10 mL, IntraVENous, 2 times per day, Joy Alvarado PA-C, 10 mL at 03/02/23 0929    sodium chloride flush 0.9 % injection 10 mL, 10 mL, IntraVENous, PRN, Joy Alvarado PA-C    0.9 % sodium chloride infusion, , IntraVENous, PRN, Joy Alvarado PA-C    sennosides-docusate sodium (SENOKOT-S) 8.6-50 MG tablet 1 tablet, 1 tablet, Oral, BID, [address] [place], [place], 1 tablet at 03/02/23 3200    magnesium hydroxide (MILK OF MAGNESIA) 400 MG/5ML suspension 30 mL, 30 mL, Oral, Daily PRN, Joy Alvarado PA-C, 30 mL at 03/01/23 1030    bisacodyl (DULCOLAX) suppository 10 mg, 10 mg, Rectal, Daily PRN, Lynn JOEL Harding, 10 mg at 02/27/23 1049         REASON FOR HOSPITALIZATION:  Sheri Lopez is a 80 y.o. female with a PMHx of dementia and recent admission to our service from 2/11/23-2/15/23 s/p left hemiarthroplasty with Orthopaedic Surgery (Dr. Gary Dhillon) on 2/12/15 presented to [facility] ED on 2/26/2023 s/p unwitnessed fall from bed at [facility]. Trauma workup found left posterior hip dislocation of prosthetic acetabulum s/p reduction of posterior dislocation left hip arthroplasty be ED prior to trauma consult evaluation. SIGNIFICANT FINDINGS:  Catalog of Injuries:   S/p fall at SNF on 2/26/23  Posterior dislocation of left hip  Dislocation of hip joint prosthesis  Acute pain due to trauma  Acute post-operative pain  Acute blood loss anemia  Tachycardia    Incidental Findings: Discussed by RAL, 2/28/2023  There is a large cervical cyst identified at 5.3 cm which can be further characterized with ultrasound if clinically indicated. HOSPITAL COURSE:  2/26/2023:  Presented to ED s/p unwitnessed fall from bed at [facility]. ED with attempted reduction prior to Trauma Consult. Prosthetic hip with partial reduction. Admitted to trauma. Ortho consulted. NPO at midnight for possible OR with Ortho tomorrow. 2/27/2023: s/p conversion to left total hip replacement  2/28/2023: H/H downtrend. Incidentals discussed. 3/1/2023: Question of afib RVR overnight and in AM. X2 doses of metoprolol administered. RRR in afternoon. Cardiology re-consulted and reports no evidence of A-fib. Appears to be brief ST of 140s, which was also noted on last admission. Per discussion at last admission and this admission, no further cardiac testing as patient is DNR-CCA. Pain control significantly improved. H/H stable. 3/2/2023: H/H equilibrating/remains stable. Remains HDS. Patient with adequate pain control. Medically cleared for discharge to SNF.     At time of discharge, [facility] was tolerating a regular diet, having bowel movements, and had adequate analgesia on oral pain medications. Pt's activity level was OOBAT with assistance. The patient had no signs or symptoms of complications. Patient was determined stable for discharge to: [address]    The patient was seen and examined on the day of discharge with the following findings:  Constitutional: Sitting up in hospital bed. NAD. Conversant. In good spirits. Niece at bedside. HEENT: Atraumatic normocephalic. Cardiovascular: HR in 90s on telemetry. Regular rate. DP/PT intact BLE  Pulmonary: Breaths unlabored on room air. Abdominal: Soft. Non-distended. Non-tender. Musculoskeletal: LLE aquacel dressing w/o strikethrough. Left thigh soft/compressible. Gross motor/sensation to BLE  Neurological: awake, and orientated x1 (self, niece reports this as baseline). Motor and sensory grossly intact. No focal deficits. GCS of 14 (E4, V4, M6). ANTICIPATED FOLLOW UP:  Future Appointments   Date Time Provider Juliano Moore   3/15/2023 10:30 AM [facility], MD [facility]     No discharge procedures on file. Other indicated follow up and instructions for scheduling:  - Follow up with Orthopedics (Dr. Ryan Lawrence) TBD.  - Follow up with PCP for chronic medical conditions and incidental findings  - No trauma follow up indicated. VTE RISK AT DISCHARGE:  Per trauma program protocol, patient DOES REQUIRE post-discharge VTE prophylaxis.  Patient will require Lovenox 30mg BID for 4 weeks until 3/30/23.    --  Bia Bentley PA-C  Trauma/Critical Care/ Emergency General Surgery    [see treatment team sticky note for contact information]      35 minutes were spent on the discharge of this patient including final examination of the patient, discussion of the hospital stay, instructions for continuing care to all relevant caregivers, preparation of discharge records, prescriptions and referral forms, and clear identification of reasons to return to clinic or to emergency room.

## 2023-03-02 NOTE — PROGRESS NOTES
Physical Therapy Med Surg Daily Treatment Note  Facility/Department: [facility]  Room: E591/L434-66       NAME: Matti Tavera  : 1940 (41 y.o.)  MRN: [id number]  CODE STATUS: DNR-CCA    Date of Service: 3/2/2023    Patient Diagnosis(es): Frequent falls [R29.6]  Posterior dislocation of left hip, initial encounter [facility]) [S73.015A]  Dislocation of hip joint prosthesis, initial encounter [facility]) [U79.160Z, [address]   Chief Complaint   Patient presents with    Fall     Patient Active Problem List    Diagnosis Date Noted    Posterior dislocation of left hip ([facility] [address] 2023    Closed displaced fracture of posterior wall of left acetabulum ([facility] [address] 2023    Dislocation of hip joint prosthesis, initial encounter ([facility] [address] 2023    Acute pain due to trauma 2023    Acute postoperative pain 2023    Acute postoperative anemia due to expected blood loss 2023    Osteopenia of left thigh 2023    Fall 2023    Vitamin D deficiency 2022    History of COVID-19 2022    Late onset Alzheimer's dementia without behavioral disturbance ([address] 2022    Lumbar transverse process fracture, closed, initial encounter ([facility] [address]     Closed fracture of sacrum, initial encounter ([facility] [address] 2022        Past Medical History:   Diagnosis Date    Asymptomatic varicose veins of both lower extremities     Dementia with behavioral disturbance     Elevated blood pressure reading     Full dentures     History of hip fracture     Small vessel disease, cerebrovascular      Past Surgical History:   Procedure Laterality Date    HIP FRACTURE SURGERY  2012    HIP SURGERY Left 2023    LEFT HIP HEMIARTHROPLASTY, PROPHYLACTIC STABILIZATION OF LEFT FEMUR performed by Eliel Early MD at [address] [place] [address] Left 2023    REVISON LEFT HIP ARTHROPLASTY TO TOTAL HIP ARTHROPLASTY performed by Eliel Early MD at [facility]       Chart Reviewed: Yes  Family / Caregiver Present: Yes  General Comment  Comments: pt's daughter reports pt set to dc to SNF today, pt ready to return to bed. Restrictions:  Restrictions/Precautions: Weight Bearing;Up as Tolerated; Fall Risk  Lower Extremity Weight Bearing Restrictions  Left Lower Extremity Weight Bearing: Toe Touch Weight Bearing  Position Activity Restriction  Hip Precautions: Posterior hip precautions    SUBJECTIVE:   Subjective: \"okay. \"    Pain  Pain: denies pain at rest.     OBJECTIVE:        Bed mobility  Sit to Supine: 2 Person assistance;Maximum assistance  Bed Mobility Comments: improved ability to follow cues, still needs significant assistance to complete. Transfers  Sit to Stand: Maximum Assistance;2 Person Assistance  Stand to Sit: Maximum Assistance;2 Person Assistance  Bed to Chair: Maximum assistance;2 Person Assistance  Comment: face to face pivot transfer completed, vc's for improved technique and sequencing, pt with poor follow through of cues. pt needs assistance to maintain TTWB LLE             Activity Tolerance  Activity Tolerance: Treatment limited secondary to decreased cognition;Patient tolerated treatment well          ASSESSMENT   Assessment: pt limited by cognition, needs physical assistance to maintain TTWB LLE. Discharge Recommendations:  Continue to assess pending progress, Patient would benefit from continued therapy after discharge         Goals  Long Term Goals  Long Term Goal 1: Pt to complete rolling L<>R in supine with Zina  Long Term Goal 2: Pt to complete sit<>supine with Zina  Long Term Goal 3: Pt to tolerate sitting EOB maintaining midline unsupported wiht SBA  Long Term Goal 4: Pt to complete HEP with CGA    PLAN    General Plan:  (1-2 visits daily as tolerated)  Safety Devices  Type of Devices:  All fall risk precautions in place, Call light within reach, Left in bed, Bed alarm in place, Nurse notified     [facility] (6 CLICK) [address] Mobility Raw Score : 8      Therapy Time Individual   Time In 1343   Time Out 1353   Minutes 10      BM/trsf: [id number] Gr[address] Ruiz PTA, 03/02/23 at 2:51 PM         Definitions for assistance levels  Independent = pt does not require any physical supervision or assistance from another person for activity completion. Device may be needed.   Stand by assistance = pt requires verbal cues or instructions from another person, close to but not touching, to perform the activity  Minimal assistance= pt performs 75% or more of the activity; assistance is required to complete the activity  Moderate assistance= pt performs 50% of the activity; assistance is required to complete the activity  Maximal assistance = pt performs 25% of the activity; assistance is required to complete the activity  Dependent = pt requires total physical assistance to accomplish the task

## 2023-03-02 NOTE — PROGRESS NOTES
Physical Therapy Missed Treatment   Facility/Department: [facility] MED SURG K890/A971-81    NAME: Alana Barrett  Patient Status:   : 1940 (80 y.o.)  MRN: [id number]  Account: [de-identified]  Gender: female        [] Patient Declines PT Treatment            [x] Patient Unavailable:     Attempted PT tx this am but pt needed to be cleaned up. Requested that PT please return as pt wants to get up. Will attempt PT Treatment again at earliest convenience.         Electronically signed by Stephanie Brown PTA on 3/2/23 at 9:52 AM EST

## 2023-03-02 NOTE — PROGRESS NOTES
Physician Progress Note    3/2/2023   1:44 PM    Name:  Misa Little  MRN:    [id number]      Day: 4     Admit Date: 2/26/2023  6:49 AM  PCP: Gerald Esparza MD    Code Status:  DNR-CCA    Subjective:     Mobilizing better today. Going to the [facility] this afternoon.     Current Facility-Administered Medications   Medication Dose Route Frequency Provider Last Rate Last Admin    potassium bicarb-citric acid (EFFER-K) effervescent tablet 40 mEq  40 mEq Oral Once Dannajorge Rasmussen DO        oxyCODONE (ROXICODONE) immediate release tablet 5 mg  5 mg Oral Q4H PRN CASSIUS Oliva        pantoprazole (PROTONIX) tablet 20 mg  20 mg Oral QAM AC Mikayla Maradiaga MD   20 mg at 03/02/23 7575    ascorbic acid (VITAMIN C) tablet 500 mg  500 mg Oral Daily Mikayla Maradiaga MD   500 mg at 03/02/23 8181    iron polysaccharides (NIFEREX) capsule 150 mg  150 mg Oral Daily Mikayla Maradiaga MD   150 mg at 03/02/23 0930    acetaminophen (TYLENOL) tablet 650 mg  650 mg Oral Q4H [address] [place], [place]   650 mg at 03/02/23 1794    methocarbamol (ROBAXIN) tablet 750 mg  750 mg Oral 4x Daily Irena Malagon PA-C   750 mg at 03/02/23 1258    lidocaine 4 % external patch 2 patch  2 patch TransDERmal Daily Nathan Lao   2 patch at 03/02/23 0930    donepezil (ARICEPT) tablet 5 mg  5 mg Oral Daily Irena Malagon PA-C   5 mg at 03/02/23 0929    enoxaparin Sodium (LOVENOX) injection 30 mg  30 mg SubCUTAneous BID Irena Malagon PA-C   30 mg at 03/02/23 0931    QUEtiapine (SEROQUEL) tablet 12.5 mg  12.5 mg Oral Nightly Irena Malagon PA-C   12.5 mg at 03/01/23 2036    Vitamin D (CHOLECALCIFEROL) tablet 1,000 Units  1,000 Units Oral Daily Nathan Lao   1,000 Units at 03/02/23 0353    sodium chloride flush 0.9 % injection 10 mL  10 mL IntraVENous 2 times per day Irena Malagon PA-C   10 mL at 03/02/23 0953    sodium chloride flush 0.9 % injection 10 mL  10 mL IntraVENous PRN Génesispatrick Rodriguez PA-C        0.9 % sodium chloride infusion   IntraVENous PRN Génesispatrick Rodriguez PA-C        sennosides-docusate sodium (SENOKOT-S) 8.6-50 MG tablet 1 tablet  1 tablet Oral BID Nathan Padron   1 tablet at 23 9450    magnesium hydroxide (MILK OF MAGNESIA) 400 MG/5ML suspension 30 mL  30 mL Oral Daily PRN Génesiseligio Rodriguez PA-C   30 mL at 23 1030    bisacodyl (DULCOLAX) suppository 10 mg  10 mg Rectal Daily PRN Génesiseligio Rodriguez PA-C   10 mg at 23 1049       Physical Examination:      Vitals:  BP (!) 141/53   Pulse 89   Temp 98.4 °F (36.9 °C) (Oral)   Resp 16   Ht 5' 2\" (1.575 m)   Wt 133 lb 14.4 oz (60.7 kg)   SpO2 96%   BMI 24.49 kg/m²   Temp (24hrs), Av.6 °F (37 °C), Min:98.4 °F (36.9 °C), Max:98.8 °F (37.1 °C)      General appearance: alert, cooperative and no distress. Elderly  Mental Status: oriented to person, place and time and normal affect  Lungs: clear to auscultation bilaterally, normal effort  Heart: regular rate and rhythm, no murmur  Abdomen: soft, nontender, nondistended, bowel sounds present, no masses  Extremities: no edema, redness, tenderness in the calves. Cap refill <2s    Data:     Labs:  Recent Labs     23  1015 23  0605   WBC 8.1 6.0   HGB 8.8* 7.9*   * 412*     Recent Labs     23  1015 23  0605    141   K 3.5 3.4    107   CO2 24 25   BUN 5* 7*   CREATININE 0.53 0.62   GLUCOSE 103* 85     No results for input(s): AST, ALT, ALB, BILITOT, ALKPHOS in the last 72 hours. Assessment and Plan:        1. Posterior dislocation of left hip s/p revision left hip arthroplasty  -Postoperative antibiotics, DVT prophylaxis, pain control, activity, wound care per surgeon    Expected postoperative blood loss anemia  Hypokalemia: Replete  Dementia    Okay for discharge when cleared by surgeon.   Internal medicine to sign off. Please reconsult if new issues arise.     27 minutes in total care time    Electronically signed by Yury Sanchez DO on 3/2/2023 at 1:44 PM

## 2023-03-02 NOTE — PROGRESS NOTES
Chief Complaint   Patient presents with    Fall          Patient is a 80 y.o. female who presents with a chief complaint of fall and dislocated hip. . Patient is followed on a regular basis by Dr. Kalyan Sam MD. patient with a recent hip fracture status post surgery now presents with fall and dislocation requiring surgical intervention. Patient underwent surgery yesterday without any perioperative events. She denies any chest pain chest pressure heaviness. Does have history of PSVT from previous admission treated medically. She is DNR CCA. No recent cardiac testing. No previous echo or stress test on chart. 3-1-23: had an episode of ST in 140's, no definitive afib. NSR on tele. Now  Resting comfortably.  No CP or SOB.     3/2/2023: Resting comfortably with no signs of atrial fibrillation       Past Medical History        Past Medical History:   Diagnosis Date    Asymptomatic varicose veins of both lower extremities      Dementia with behavioral disturbance      Elevated blood pressure reading      Full dentures      History of hip fracture 2012    Small vessel disease, cerebrovascular               Patient Active Problem List   Diagnosis    Closed fracture of sacrum, initial encounter ([address]    Lumbar transverse process fracture, closed, initial encounter ([address]    History of COVID-19    Late onset Alzheimer's dementia without behavioral disturbance ([address]    Vitamin D deficiency    Osteopenia of left thigh    Fall    Acute pain due to trauma    Acute postoperative pain    Acute postoperative anemia due to expected blood loss    Dislocation of hip joint prosthesis, initial encounter ([address]    Closed displaced fracture of posterior wall of left acetabulum [facility])         Past Surgical History         Past Surgical History:   Procedure Laterality Date    HIP FRACTURE SURGERY   2012    HIP SURGERY Left 2/12/2023     LEFT HIP HEMIARTHROPLASTY, PROPHYLACTIC STABILIZATION OF LEFT FEMUR performed by [facility], MD at [address] [place] [address] Left 2/27/2023     REVISON LEFT HIP ARTHROPLASTY TO TOTAL HIP ARTHROPLASTY performed by Sriram Ladd MD at [facility] History            Socioeconomic History    Marital status:        Spouse name: None    Number of children: None    Years of education: None    Highest education level: None   Tobacco Use    Smoking status: Unknown    Smokeless tobacco: Never   Vaping Use    Vaping Use: Never used   Social History Narrative     No children     States she has worked in [employer] in [address] [place] [address] with behavior problems in 2020     Resides at [facility]      Social Determinants of Health          Physical Activity: Insufficiently Active    Days of Exercise per Week: 2 days    Minutes of Exercise per Session: 30 min            Family History   Family History   Family history unknown:  Yes            Current Facility-Administered Medications             Current Facility-Administered Medications   Medication Dose Route Frequency Provider Last Rate Last Admin    pantoprazole (PROTONIX) tablet 20 mg  20 mg Oral QAM AC Aliza Dean MD   20 mg at 02/28/23 1207    ascorbic acid (VITAMIN C) tablet 500 mg  500 mg Oral Daily Aliza Dean MD   500 mg at 02/28/23 1206    iron polysaccharides (NIFEREX) capsule 150 mg  150 mg Oral Daily Aliza Dean MD   150 mg at 02/28/23 1206    acetaminophen (TYLENOL) tablet 650 mg  650 mg Oral Q4H [address] [place], [place]   650 mg at 02/28/23 1207    methocarbamol (ROBAXIN) tablet 750 mg  750 mg Oral 4x Daily [place], [place]   750 mg at 02/28/23 1207    lidocaine 4 % external patch 2 patch  2 patch TransDERmal Daily [place], [place]   2 patch at 02/28/23 1202    oxyCODONE (ROXICODONE) immediate release tablet 5 mg  5 mg Oral Q4H PRN [facility]   5 mg at 02/28/23 7633     Or    oxyCODONE (ROXICODONE) immediate release tablet 10 mg  10 mg Oral Q4H PRN Altaf Quach PA-C        HYDROmorphone (DILAUDID) injection 0.25 mg  0.25 mg IntraVENous Q4H PRN Altaf Quach PA-C        donepezil (ARICEPT) tablet 5 mg  5 mg Oral Daily Altaf Quach PA-C   5 mg at 02/28/23 1207    enoxaparin Sodium (LOVENOX) injection 30 mg  30 mg SubCUTAneous BID Altaf Quach PA-C   30 mg at 02/28/23 1202    QUEtiapine (SEROQUEL) tablet 12.5 mg  12.5 mg Oral Nightly Altaf Quach PA-C   12.5 mg at 02/27/23 2047    Vitamin D (CHOLECALCIFEROL) tablet 1,000 Units  1,000 Units Oral Daily [facility], [place]   1,000 Units at 02/28/23 1050    sodium chloride flush 0.9 % injection 10 mL  10 mL IntraVENous 2 times per day Altaf Quach PA-C   10 mL at 02/27/23 2047    sodium chloride flush 0.9 % injection 10 mL  10 mL IntraVENous PRN Altaf Quach PA-C        0.9 % sodium chloride infusion   IntraVENous PRN Altaf Quach PA-C        sennosides-docusate sodium (SENOKOT-S) 8.6-50 MG tablet 1 tablet  1 tablet Oral BID [facility], [place]   1 tablet at 02/28/23 1206    magnesium hydroxide (MILK OF MAGNESIA) 400 MG/5ML suspension 30 mL  30 mL Oral Daily PRN Altaf Quach PA-C        bisacodyl (DULCOLAX) suppository 10 mg  10 mg Rectal Daily PRN Altaf Quach PA-C   10 mg at 02/27/23 1049    lactated ringers IV soln infusion   IntraVENous Continuous Altaf Quach PA-C 100 mL/hr at 02/27/23 1340 New Bag at 02/27/23 1442            ALLERGIES: Bactrim [sulfamethoxazole-trimethoprim], Bee venom, Ceftin [cefuroxime], and Wasp venom     Review of Systems   Constitutional: Negative. Negative for chills and fever. HENT: Negative. Eyes: Negative. Respiratory:  Negative for shortness of breath and wheezing.     Cardiovascular:  Negative for chest pain, palpitations and leg swelling. Gastrointestinal: Negative. Negative for abdominal pain, nausea and vomiting. Endocrine: Negative. Genitourinary: Negative. Musculoskeletal: Negative. Skin: Negative. Negative for rash. Allergic/Immunologic: Negative. Neurological: Negative. Negative for dizziness, weakness and headaches. Hematological: Negative. Psychiatric/Behavioral: Negative. VITALS:  Blood pressure (!) 123/48, pulse (!) 108, temperature 97.7 °F (36.5 °C), temperature source Oral, resp. rate 16, weight 136 lb 11.2 oz (62 kg), SpO2 100 %. Body mass index is 25 kg/m². Physical Exam  Vitals and nursing note reviewed. Constitutional:       Appearance: She is well-developed. She is not diaphoretic. HENT:      Head: Normocephalic and atraumatic. Eyes:      Pupils: Pupils are equal, round, and reactive to light. Neck:      Thyroid: No thyromegaly. Vascular: No JVD. Trachea: No tracheal deviation. Cardiovascular:      Rate and Rhythm: Normal rate and regular rhythm. Chest Wall: PMI is not displaced. Pulses: Intact distal pulses. Heart sounds: Normal heart sounds. Heart sounds not distant. No murmur heard. No friction rub. No gallop. No S3 sounds. Pulmonary:      Effort: No respiratory distress. Breath sounds: No wheezing or rales. Chest:      Chest wall: No tenderness. Abdominal:      General: Bowel sounds are normal. There is no distension. Palpations: Abdomen is soft. There is no mass. Tenderness: There is no abdominal tenderness. There is no guarding or rebound. Musculoskeletal:         General: Normal range of motion. Cervical back: Normal range of motion and neck supple. Skin:     General: Skin is warm and dry. Coloration: Skin is not pale. Findings: No erythema or rash. Neurological:      Mental Status: She is alert and oriented to person, place, and time.       Cranial Nerves: No cranial nerve deficit. Psychiatric:         Behavior: Behavior normal.         Thought Content:  Thought content normal.         Judgment: Judgment normal.         LABS:  Recent Results         Recent Results (from the past 24 hour(s))   CBC with Auto Differential     Collection Time: 02/28/23  4:53 AM   Result Value Ref Range     WBC 8.0 4.8 - 10.8 K/uL     RBC 3.17 (L) 4.20 - 5.40 M/uL     Hemoglobin 8.4 (L) 12.0 - 16.0 g/dL     Hematocrit 26.1 (L) 37.0 - 47.0 %     MCV 82.3 79.4 - 94.8 fL     MCH 26.6 (L) 27.0 - 31.3 pg     MCHC 32.3 (L) 33.0 - 37.0 %     RDW 15.8 (H) 11.5 - 14.5 %     Platelets 707 (H) 186 - 400 K/uL     Neutrophils % 68.4 %     Lymphocytes % 16.4 %     Monocytes % 13.7 %     Eosinophils % 1.0 %     Basophils % 0.5 %     Neutrophils Absolute 5.5 1.4 - 6.5 K/uL     Lymphocytes Absolute 1.3 1.0 - 4.8 K/uL     Monocytes Absolute 1.1 (H) 0.2 - 0.8 K/uL     Eosinophils Absolute 0.1 0.0 - 0.7 K/uL     Basophils Absolute 0.0 0.0 - 0.2 K/uL   Basic Metabolic Panel w/ Reflex to MG     Collection Time: 02/28/23  4:53 AM   Result Value Ref Range     Sodium 140 135 - 144 mEq/L     Potassium reflex Magnesium 4.0 3.4 - 4.9 mEq/L     Chloride 106 95 - 107 mEq/L     CO2 23 20 - 31 mEq/L     Anion Gap 11 9 - 15 mEq/L     Glucose 83 70 - 99 mg/dL     BUN 6 (L) 8 - 23 mg/dL     Creatinine 0.61 0.50 - 0.90 mg/dL     Est, Glom Filt Rate >60.0 >60     Calcium 8.2 (L) 8.5 - 9.9 mg/dL   Hemoglobin and Hematocrit     Collection Time: 02/28/23  3:11 PM   Result Value Ref Range     Hemoglobin 7.8 (L) 12.0 - 16.0 g/dL     Hematocrit 23.7 (L) 37.0 - 47.0 %         Troponin:         Lab Results   Component Value Date/Time     TROPONINI <0.10 01/13/2021 12:00 AM         EKG: normal sinus rhythm, nonspecific ST and T waves changes        ASSESSMENT:           Active Hospital Problems     Diagnosis Date Noted    Closed displaced fracture of posterior wall of left acetabulum [facility]) [S32.422A] 02/27/2023       Priority: Medium    Dislocation of hip joint prosthesis, initial encounter [facility]) [Q09.206P, [address] 02/26/2023       Priority: Medium    Acute pain due to trauma [G89.11] 02/13/2023       Priority: Medium    Acute postoperative pain [G89.18] 02/13/2023       Priority: Medium    Acute postoperative anemia due to expected blood loss [D62] 02/13/2023       Priority: Medium    Fall [W19. XXXA] 02/12/2023       Priority: Medium    Late onset Alzheimer's dementia without behavioral disturbance ([facility] [G30.1, F02.80] 02/21/2022       Priority: Low         Preoperative clearance-did well with surgery  History of PSVT-currently normal sinus rhythm  No clear evidence of afib. Sinus tach only. PLAN:   Conservative medical therapy from cardiology standpoint. GI/DVT prophylaxis  Maintain potassium between 4-5 and magnesium greater than 2  No plans for any cardiac testing at this time due to DNR CCA.   Stable for discharge    Electronically signed by [address] JE[address]-PIERRE Mendez DO on 3/2/2023 at 5:31 PM

## 2023-03-02 NOTE — PROGRESS NOTES
Physical Therapy Med Surg Daily Treatment Note  [facility]Department[facility]  [facility]       NAME: Zacarias Ortiz  : 1940 (14 y.o.)  MRN: [id number]  CODE STATUS: DNR-CCA    Date of Service: 3/2/2023    Patient Diagnosis(es): Frequent falls [R29.6]  Posterior dislocation of left hip, initial encounter [facility]) [S73.015A]  Dislocation of hip joint prosthesis, initial encounter [facility]) [D28.864N, [address]   Chief Complaint   Patient presents with    Fall     Patient Active Problem List    Diagnosis Date Noted    Posterior dislocation of left hip ([facility] 2023    Closed displaced fracture of posterior wall of left acetabulum ([facility] 2023    Dislocation of hip joint prosthesis, initial encounter ([facility] 2023    Acute pain due to trauma 2023    Acute postoperative pain 2023    Acute postoperative anemia due to expected blood loss 2023    Osteopenia of left thigh 2023    Fall 2023    Vitamin D deficiency 2022    History of COVID-19 2022    Late onset Alzheimer's dementia without behavioral disturbance ([address] 2022    Lumbar transverse process fracture, closed, initial encounter ([facility]     Closed fracture of sacrum, initial encounter ([facility] 2022        Past Medical History:   Diagnosis Date    Asymptomatic varicose veins of both lower extremities     Dementia with behavioral disturbance     Elevated blood pressure reading     Full dentures     History of hip fracture     Small vessel disease, cerebrovascular      Past Surgical History:   Procedure Laterality Date    HIP FRACTURE SURGERY  2012    HIP SURGERY Left 2023    LEFT HIP HEMIARTHROPLASTY, PROPHYLACTIC STABILIZATION OF LEFT FEMUR performed by Josiah Guevara MD at [address] [place] [address] 2023    REVISON LEFT HIP ARTHROPLASTY TO TOTAL HIP ARTHROPLASTY performed by Josiah Guevara MD at [address] [facility]: fall risk. Tdwb left leg. Posterior hip precautions       SUBJECTIVE:   Subjective: \"I will do what's best\"    Pain  Pain: denies pain at rest.    OBJECTIVE:   Orientation  Overall Orientation Status: Impaired  Orientation Level: Oriented to person;Oriented to situation  Cognition  Overall Cognitive Status: Exceptions  Arousal/Alertness: Delayed responses to stimuli  Following Commands: Inconsistently follows commands  Attention Span: Difficulty attending to directions; Difficulty dividing attention  Problem Solving: Assistance required to generate solutions;Assistance required to identify errors made;Assistance required to correct errors made;Assistance required to implement solutions  Insights: Decreased awareness of deficits  Initiation: Requires cues for all  Cognition Comment: Poor sequencing and safety    Bed Mobility Training  Bed Mobility Training: Yes  Overall Level of Assistance: Moderate assistance;Assist X2  Interventions: Safety awareness training; Tactile cues; Verbal cues  Supine to Sit: Moderate assistance;Assist X2  Sit to Supine: Moderate assistance;Assist X2  Scooting: Moderate assistance    Transfer Training  Transfer Training: Yes  Overall Level of Assistance: Total assistance  Interventions: Safety awareness training; Tactile cues; Verbal cues  Stand Pivot Transfers: Total assistance  Bed to Chair: Total assistance                                              ASSESSMENT pt asked to go back to bed once sitting up. Encouraged her to get up and have her meal and that PT would return to get back in bed after. Pt okay with sitting up for a few hours. [facility] present and is staying this am with pt.          Discharge Recommendations:  Continue to assess pending progress, Patient would benefit from continued therapy after discharge         Goals  Long Term Goals  Long Term Goal 1: Pt to complete rolling L<>R in supine with Zina  Long Term Goal 2: Pt to complete sit<>supine with Zina  Long Term Goal 3: Pt to tolerate sitting EOB maintaining midline unsupported wiht SBA  Long Term Goal 4: Pt to complete HEP with CGA    PLAN    General Plan:  (1-2 visits daily as tolerated)        [facility] (6 CLICK) BASIC MOBILITY  AM-PAC Inpatient Mobility Raw Score : 8     Therapy Time   Individual   Time In  1035   Time Out 1050   Minutes 15   15 minutes bed mob/transfers          [facility] ALCIDES HIRA KENNEDY, 03/02/23 at 10:57 AM         Definitions for assistance levels  Independent = pt does not require any physical supervision or assistance from another person for activity completion. Device may be needed.   Stand by assistance = pt requires verbal cues or instructions from another person, close to but not touching, to perform the activity  Minimal assistance= pt performs 75% or more of the activity; assistance is required to complete the activity  Moderate assistance= pt performs 50% of the activity; assistance is required to complete the activity  Maximal assistance = pt performs 25% of the activity; assistance is required to complete the activity  Dependent = pt requires total physical assistance to accomplish the task

## 2023-03-03 ENCOUNTER — OFFICE VISIT (OUTPATIENT)
Dept: GERIATRIC MEDICINE | Age: 83
End: 2023-03-03

## 2023-03-03 DIAGNOSIS — S32.422D CLOSED DISPLACED FRACTURE OF POSTERIOR WALL OF LEFT ACETABULUM WITH ROUTINE HEALING, SUBSEQUENT ENCOUNTER: Primary | ICD-10-CM

## 2023-03-03 DIAGNOSIS — G89.18 POST-OP PAIN: ICD-10-CM

## 2023-03-03 DIAGNOSIS — K59.00 CONSTIPATION, UNSPECIFIED CONSTIPATION TYPE: ICD-10-CM

## 2023-03-03 DIAGNOSIS — Z78.9 IMPAIRED MOBILITY AND ADLS: ICD-10-CM

## 2023-03-03 DIAGNOSIS — R60.0 EDEMA OF BOTH FEET: ICD-10-CM

## 2023-03-03 DIAGNOSIS — Z74.09 IMPAIRED MOBILITY AND ADLS: ICD-10-CM

## 2023-03-03 DIAGNOSIS — G30.1 LATE ONSET ALZHEIMER'S DEMENTIA WITHOUT BEHAVIORAL DISTURBANCE (HCC): ICD-10-CM

## 2023-03-03 DIAGNOSIS — I10 ESSENTIAL HYPERTENSION: ICD-10-CM

## 2023-03-03 DIAGNOSIS — F02.80 LATE ONSET ALZHEIMER'S DEMENTIA WITHOUT BEHAVIORAL DISTURBANCE (HCC): ICD-10-CM

## 2023-03-03 LAB
EKG ATRIAL RATE: 129 BPM
EKG Q-T INTERVAL: 352 MS
EKG QRS DURATION: 76 MS
EKG QTC CALCULATION (BAZETT): 545 MS
EKG R AXIS: 66 DEGREES
EKG T AXIS: 50 DEGREES
EKG VENTRICULAR RATE: 144 BPM

## 2023-03-07 LAB
BASOPHILS ABSOLUTE: 0.2 /ΜL
BASOPHILS RELATIVE PERCENT: 2.5 %
BUN BLDV-MCNC: 6 MG/DL
CALCIUM SERPL-MCNC: 8.7 MG/DL
CHLORIDE BLD-SCNC: 102 MMOL/L
CO2: 27 MMOL/L
CREAT SERPL-MCNC: 0.7 MG/DL
EGFR: 80
EOSINOPHILS ABSOLUTE: 0.1 /ΜL
EOSINOPHILS RELATIVE PERCENT: 1.5 %
GLUCOSE BLD-MCNC: 76 MG/DL
HCT VFR BLD CALC: 27.6 % (ref 36–46)
HEMOGLOBIN: 8.9 G/DL (ref 12–16)
LYMPHOCYTES ABSOLUTE: 1.3 /ΜL
LYMPHOCYTES RELATIVE PERCENT: 21.4 %
MCH RBC QN AUTO: 27 PG
MCHC RBC AUTO-ENTMCNC: 32.3 G/DL
MCV RBC AUTO: 83.4 FL
MONOCYTES ABSOLUTE: 0.8 /ΜL
MONOCYTES RELATIVE PERCENT: 13.4 %
NEUTROPHILS ABSOLUTE: 3.8 /ΜL
NEUTROPHILS RELATIVE PERCENT: 61.2 %
PLATELET # BLD: 420 K/ΜL
PMV BLD AUTO: 7.4 FL
POTASSIUM SERPL-SCNC: 4.1 MMOL/L
RBC # BLD: 3.31 10^6/ΜL
SODIUM BLD-SCNC: 140 MMOL/L
WBC # BLD: 6.2 10^3/ML

## 2023-03-13 DIAGNOSIS — T84.029A DISLOCATION OF HIP JOINT PROSTHESIS, INITIAL ENCOUNTER (HCC): Primary | ICD-10-CM

## 2023-03-13 DIAGNOSIS — S32.422D CLOSED DISPLACED FRACTURE OF POSTERIOR WALL OF LEFT ACETABULUM WITH ROUTINE HEALING, SUBSEQUENT ENCOUNTER: ICD-10-CM

## 2023-03-13 DIAGNOSIS — Z96.649 DISLOCATION OF HIP JOINT PROSTHESIS, INITIAL ENCOUNTER (HCC): Primary | ICD-10-CM

## 2023-03-13 RX ORDER — HYDROCODONE BITARTRATE AND ACETAMINOPHEN 5; 325 MG/1; MG/1
1 TABLET ORAL
Qty: 1 TABLET | Refills: 0 | Status: SHIPPED | OUTPATIENT
Start: 2023-03-13 | End: 2023-03-13

## 2023-03-13 RX ORDER — HYDROCODONE BITARTRATE AND ACETAMINOPHEN 5; 325 MG/1; MG/1
1 TABLET ORAL EVERY 8 HOURS PRN
Qty: 30 TABLET | Refills: 0 | Status: SHIPPED | OUTPATIENT
Start: 2023-03-13 | End: 2023-03-23

## 2023-03-14 ENCOUNTER — OFFICE VISIT (OUTPATIENT)
Dept: GERIATRIC MEDICINE | Age: 83
End: 2023-03-14
Payer: MEDICARE

## 2023-03-14 DIAGNOSIS — F02.80 LATE ONSET ALZHEIMER'S DEMENTIA WITHOUT BEHAVIORAL DISTURBANCE (HCC): ICD-10-CM

## 2023-03-14 DIAGNOSIS — G30.1 LATE ONSET ALZHEIMER'S DEMENTIA WITHOUT BEHAVIORAL DISTURBANCE (HCC): ICD-10-CM

## 2023-03-14 DIAGNOSIS — S32.422D CLOSED DISPLACED FRACTURE OF POSTERIOR WALL OF LEFT ACETABULUM WITH ROUTINE HEALING, SUBSEQUENT ENCOUNTER: ICD-10-CM

## 2023-03-14 DIAGNOSIS — R60.0 EDEMA OF BOTH FEET: ICD-10-CM

## 2023-03-14 DIAGNOSIS — G89.18 POST-OP PAIN: Primary | ICD-10-CM

## 2023-03-14 DIAGNOSIS — I10 ESSENTIAL HYPERTENSION: ICD-10-CM

## 2023-03-14 PROCEDURE — G9692 HOSP RECD BY PT DUR MSMT PER: HCPCS | Performed by: FAMILY MEDICINE

## 2023-03-14 PROCEDURE — 99309 SBSQ NF CARE MODERATE MDM 30: CPT | Performed by: FAMILY MEDICINE

## 2023-03-15 ENCOUNTER — OFFICE VISIT (OUTPATIENT)
Dept: ORTHOPEDIC SURGERY | Age: 83
End: 2023-03-15

## 2023-03-15 ENCOUNTER — HOSPITAL ENCOUNTER (OUTPATIENT)
Dept: ORTHOPEDIC SURGERY | Age: 83
Discharge: HOME | End: 2023-03-17
Payer: MEDICARE

## 2023-03-15 VITALS — HEIGHT: 62 IN | TEMPERATURE: 97 F | WEIGHT: 133 LBS | BODY MASS INDEX: 24.48 KG/M2

## 2023-03-15 DIAGNOSIS — Z98.890 POST-OPERATIVE STATE: ICD-10-CM

## 2023-03-15 DIAGNOSIS — Z96.642 STATUS POST TOTAL REPLACEMENT OF LEFT HIP: Primary | ICD-10-CM

## 2023-03-15 PROCEDURE — 73502 X-RAY EXAM HIP UNI 2-3 VIEWS: CPT

## 2023-03-15 PROCEDURE — 99024 POSTOP FOLLOW-UP VISIT: CPT | Performed by: STUDENT IN AN ORGANIZED HEALTH CARE EDUCATION/TRAINING PROGRAM

## 2023-03-15 NOTE — PROGRESS NOTES
Subjective:      Patient ID: Dheeraj Brar is a 80 y.o. female who presents today for:  Chief Complaint   Patient presents with    Follow-up     Closed FX of left hip     HPI:    Patient returns with her niece who is her power of , Kylie Avelar. She has been with her throughout her entire treatment through the initial hip fracture, the hemiarthroplasty, the revision total hip which was performed for instability related to a posterior wall fracture after another fall. 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 I had instructed the patient to be nonweightbearing in the postoperative period however she has severe dementia and really unable to comply with restrictions on her own. Is not clear if the facility she is at has continued the strict nonweightbearing restriction. She has been wearing the abduction pillow. Approximately 2 or 3 days ago patient was having some increased pain in the left hip. She is not with any other members of the nursing facility today. I have reviewed their notes. The patient is well-appearing overall, she is in a wheelchair, awake but pretty sleepy. She is unable to hold a conversation. The discussion was had with Clyde Stock.       Past Medical History:   Diagnosis Date    Asymptomatic varicose veins of both lower extremities     Dementia with behavioral disturbance     Elevated blood pressure reading     Full dentures     History of hip fracture 2012    Small vessel disease, cerebrovascular      Past Surgical History:   Procedure Laterality Date    HIP FRACTURE SURGERY  2012    HIP SURGERY Left 2/12/2023    LEFT HIP HEMIARTHROPLASTY, PROPHYLACTIC STABILIZATION OF LEFT FEMUR performed by Sunny Yeh MD at [address] Left 2/27/2023    REVISON LEFT HIP ARTHROPLASTY TO TOTAL HIP ARTHROPLASTY performed by Sunny Yeh MD at [address] [facility] History     Socioeconomic History Marital status:      Spouse name: Not on file    Number of children: Not on file    Years of education: Not on file    Highest education level: Not on file   Occupational History    Not on file   Tobacco Use    Smoking status: Unknown    Smokeless tobacco: Never   Vaping Use    Vaping Use: Never used   Substance and Sexual Activity    Alcohol use: Not on file    Drug use: Not on file    Sexual activity: Not on file   Other Topics Concern    Not on file   Social History Narrative    No children    States she has worked in [employer] in [address] [facility] dementia with behavior problems in 2020    Resides at [place] [address] Strain: Not on file   Food Insecurity: Not on file   Transportation Needs: Not on file   Physical Activity: Insufficiently Active    Days of Exercise per Week: 2 days    Minutes of Exercise per Session: 30 min   Stress: Not on file   Social Connections: Not on file   Intimate Partner Violence: Not on file   Housing Stability: Not on file     Family History   Family history unknown: Yes     Allergies   Allergen Reactions    Bactrim [Sulfamethoxazole-Trimethoprim]     Bee Venom     Ceftin [Cefuroxime]     Wasp Venom      Current Outpatient Medications on File Prior to Visit   Medication Sig Dispense Refill    HYDROcodone-acetaminophen (NORCO) 5-325 MG per tablet Take 1 tablet by mouth every 8 hours as needed for Pain for up to 10 days.  Max Daily Amount: 3 tablets 30 tablet 0    enoxaparin Sodium (LOVENOX) 30 MG/0.3ML injection Inject 0.3 mLs into the skin 2 times daily for 28 days 16.8 mL 0    lidocaine 4 % external patch Place 2 patches onto the skin daily for 14 days 28 each 0    sennosides-docusate sodium (SENOKOT-S) 8.6-50 MG tablet Take 1 tablet by mouth 2 times daily for 14 days 28 tablet 0    ascorbic acid (VITAMIN C) 500 MG tablet Take 1 tablet by mouth daily 30 tablet 0    Lactobacillus (ACIDOPHILUS) CAPS capsule Take 1 capsule by mouth 2 times daily      vitamin D3 (CHOLECALCIFEROL) 25 MCG (1000 UT) TABS tablet Take 1,000 Units by mouth daily      ketoconazole (NIZORAL) 2 % cream Apply topically 2 times daily Apply topically daily L foot      donepezil (ARICEPT) 5 MG tablet Take 5 mg by mouth daily       QUEtiapine (SEROQUEL) 25 MG tablet Take 0.5 tablets by mouth daily 30 tablet 0    acetaminophen (TYLENOL) 325 MG tablet Take 2 tablets by mouth every 4 hours as needed for Pain 120 tablet 0     No current facility-administered medications on file prior to visit. Objective--Physical Examination:     Temp 97 °F (36.1 °C) (Temporal)   Ht 5' 2\" (1.575 m)   Wt 133 lb (60.3 kg)   BMI 24.33 kg/m²     Physical Examination:  On examination the patient is awake, she is able to speak and form sentences however the context of her conversation does not match with the discussion of her problem. She has nonlabored breathing on room air   Her abdomen is soft nondistended  Left lower extremity is shortened with respect to the right  The incision is clean dry and intact and healing well. There is no sign of infection, no erythema, induration, or wound drainage  She does not have significant pain with gentle range of motion left hip. Distally she has intact DP and PT pulses. Her foot is warm and well-perfused, there is no sign of ischemia  Motor and sensory exam is limited due to patient's baseline mental status. She is grossly able to move the foot however she does not do this to command. Radiographs and Laboratory Studies:     Diagnostic Imaging Studies:    X-rays of the left hip including attempted AP pelvis and frog lateral views demonstrate that the acetabular cup has protrusion of the pelvis. The femoral component ball is remaining within the acetabular shell. There are 3 screws in the acetabular cup.  1 screws projecting into the intrapelvic space the other 2 are projecting into the iliac bone. All of the screws have changed in position relative to the postoperative x-rays that were obtained in the PACU. I again reviewed the postoperative x-rays in the PACU and compared these to the intraoperative fluoroscopy images which had demonstrated satisfactory implant positioning with screws in bone and no sign of protrusio intraoperatively. Laboratory Studies:   Lab Results   Component Value Date    WBC 6.2 03/07/2023    HGB 8.9 (A) 03/07/2023    HCT 27.6 (A) 03/07/2023    MCV 83.4 03/07/2023     03/07/2023     No results found for: SEDRATE  No results found for: CRP    Assessment / Plan:      Diagnosis Orders   1. 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 This is potentially a life and limb threatening problem. I reviewed with William Mccainsevero her power of  the imaging and the problem. We discussed the options for treatment:    Option 1 is observation with the inherent risks of vascular injury, damage to surrounding structures such as the bowel, bladder, blood vessels, nerves if the acetabular screws were to protrude into the structures. We would plan to follow this with surveillance x-rays and repeat examination over the coming weeks. Another component of this would be possibly involving palliative care or hospice. Palliative care/hospice is something that William Parisi is going to discuss with the medical providers at facility that she is currently residing. Option 2 is surgery with attempted removal of the acetabular component and screws and likely removal of the entire prosthesis. I would plan to perform an ETO to remove the entire femoral component and then place cerclage cables around the ETO to at least preserve the length of the left lower extremity for sitting balance.   Another option would be to cut off the top part of the femoral component but I would favor removal with the ETO. Because of the intrapelvic nature of the cup with the screws if we were to consider this option and would like to have a vascular surgeon available to help if there is a vascular complication with cup removal.  I discussed with her POA that if we were going to consider this option we could potentially do this surgery tomorrow here at [facility] with the assistance of Dr. Claudene Lennert, as well as the vascular surgeon. Option 3 is surgery with attempted reconstruction which would either involve a cup cage construct or consideration for a custom triflange depending on the bony anatomy. If she wanted to pursue this I would plan to get a CT with reconstructions for operative purposes. I reviewed with her POA that this however would be a very demanding surgery would likely take 4 to 5-hour operation with high risk for prosthetic infection and other complications including possibility for intraoperative death. After lengthy discussion with Dianna Live she would like some more time to think about the surgical options but is leaning towards option #1 which is observation and likely hospice or palliative care. I will plan to see her back in 2 weeks at [address] [place] [address] in [facility]. If she does elect to go with the palliative care/hospice option she may not need to have further imaging or additional office visits. I have given my cell phone again to Adama Ferrera and I am happy to help talk to anybody that has questions or concerns or if she would like to talk again about the options I instructed her to give me a call. In the meantime she should remain nonweightbearing on the left lower extremity with the abduction pillow and should have vascular checks at her facility at a minimum of every 4 hours.     Because of the complexity of the situation I have reviewed the patient's imaging and treatment options today during the appointment with my  Dr. Gina Carty who is a fellowship trained arthroplasty surgeon as well and he is in agreement with these options.       Procedures Performed Today:     None

## 2023-03-21 ENCOUNTER — OFFICE VISIT (OUTPATIENT)
Dept: GERIATRIC MEDICINE | Age: 83
End: 2023-03-21

## 2023-03-21 DIAGNOSIS — F02.80 LATE ONSET ALZHEIMER'S DEMENTIA WITHOUT BEHAVIORAL DISTURBANCE (HCC): ICD-10-CM

## 2023-03-21 DIAGNOSIS — G30.1 LATE ONSET ALZHEIMER'S DEMENTIA WITHOUT BEHAVIORAL DISTURBANCE (HCC): ICD-10-CM

## 2023-03-21 DIAGNOSIS — G89.18 POST-OP PAIN: ICD-10-CM

## 2023-03-21 DIAGNOSIS — I10 ESSENTIAL HYPERTENSION: ICD-10-CM

## 2023-03-21 DIAGNOSIS — S32.422D CLOSED DISPLACED FRACTURE OF POSTERIOR WALL OF LEFT ACETABULUM WITH ROUTINE HEALING, SUBSEQUENT ENCOUNTER: ICD-10-CM

## 2023-03-21 DIAGNOSIS — M96.89: Primary | ICD-10-CM

## 2023-03-21 PROBLEM — Z51.5 HOSPICE CARE: Status: ACTIVE | Noted: 2023-03-21

## 2023-03-25 VITALS — WEIGHT: 138.6 LBS | BODY MASS INDEX: 25.35 KG/M2

## 2023-03-25 NOTE — PROGRESS NOTES
Neurological:      Mental Status: Mental status is at baseline. Motor: Weakness present. Gait: Gait abnormal.   Psychiatric:         Behavior: Behavior normal.       Weight Trends  Wt Readings from Last 10 Encounters:   03/15/23 133 lb (60.3 kg)   03/25/23 138 lb 9.6 oz (62.9 kg)   03/02/23 133 lb 14.4 oz (60.7 kg)   02/11/23 130 lb (59 kg)   12/24/22 130 lb (59 kg)   01/21/22 130 lb (59 kg)   08/24/21 140 lb (63.5 kg)        Assessment and Plan:      Diagnosis Orders   1. Late onset Alzheimer's dementia without behavioral disturbance ([facility] [address]        2. Closed displaced fracture of posterior wall of left acetabulum with routine healing, subsequent encounter        3. Edema of both feet        4. Essential hypertension        5. Constipation, unspecified constipation type        6. Impaired mobility and ADLs           Patient at baseline mental status. Remains with postop pain though well controlled on Oxy IR at this time with pain well controlled. No noted constipation continue senna S. Continue Lovenox as prescribed given no isabel blood in stool or urine. Filled out DNR CCA per facility/family request.  Ordered for CBC BMP TSH on Monday. adhere to the JNC VIII guidelines for HTN managementand the NCEP ATP III guidelines for LDL-C management. No orders of the defined types were placed in this encounter. No orders of the defined types were placed in this encounter. No follow-ups on file. Encourage fluids and good nutrition. Stress fall prevention strategies. Electronically signed by GRISELDA Doll CNP    Total time includes reviewing Plan of Care, labs, reviewing history, medications, and allergies, counseling patient, performing medically appropriate evaluation and examination, ordering medications, and documenting in the medical record. Please note this report is partially produced by using speech recognition hardware.   It may contain errors related to the system,

## 2023-03-27 VITALS — BODY MASS INDEX: 25.61 KG/M2 | WEIGHT: 140 LBS

## 2023-03-27 NOTE — PROGRESS NOTES
03/25/23 138 lb 9.6 oz (62.9 kg)   03/02/23 133 lb 14.4 oz (60.7 kg)   02/11/23 130 lb (59 kg)   12/24/22 130 lb (59 kg)   01/21/22 130 lb (59 kg)   08/24/21 140 lb (63.5 kg)        Assessment and Plan:      Diagnosis Orders   1. Post-op pain        2. Closed displaced fracture of posterior wall of left acetabulum with routine healing, subsequent encounter        3. Edema of both feet        4. Essential hypertension        5. Late onset Alzheimer's dementia without behavioral disturbance ([facility] [address]           Start patient on [address] [facility] 5/3/2025 p.o. every 8 hours x10 days as interfering with therapy much increase in pain to site. Recent labs show hemoglobin 8.9 white blood cell 6.2. Patient does follow-up with orthopedics in AM.  Sees ortho in AM for further imaging. adhere to the JNC VIII guidelines for HTN managementand the NCEP ATP III guidelines for LDL-C management. No orders of the defined types were placed in this encounter. No orders of the defined types were placed in this encounter. No follow-ups on file. Encourage fluids and good nutrition. Stress fall prevention strategies. Electronically signed by GRISELDA Nicole CNP    Total time includes reviewing Plan of Care, labs, reviewing history, medications, and allergies, counseling patient, performing medically appropriate evaluation and examination, ordering medications, and documenting in the medical record. Please note this report is partially produced by using speech recognition hardware. It may contain errors related to the system, including grammar, punctuation and spelling as well as words and phrases that may seem inaccurate.   For any questions or concerns feel free to contact me for clarification
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
## 2023-04-03 ENCOUNTER — OFFICE VISIT (OUTPATIENT)
Dept: GERIATRIC MEDICINE | Age: 83
End: 2023-04-03

## 2023-04-03 DIAGNOSIS — E55.9 VITAMIN D DEFICIENCY: ICD-10-CM

## 2023-04-03 DIAGNOSIS — G30.1 LATE ONSET ALZHEIMER'S DEMENTIA WITHOUT BEHAVIORAL DISTURBANCE (HCC): Primary | Chronic | ICD-10-CM

## 2023-04-03 DIAGNOSIS — F02.80 LATE ONSET ALZHEIMER'S DEMENTIA WITHOUT BEHAVIORAL DISTURBANCE (HCC): Primary | Chronic | ICD-10-CM

## 2023-04-03 DIAGNOSIS — M15.9 OSTEOARTHRITIS OF MULTIPLE JOINTS, UNSPECIFIED OSTEOARTHRITIS TYPE: ICD-10-CM

## 2023-04-19 VITALS — BODY MASS INDEX: 25.61 KG/M2 | WEIGHT: 140 LBS

## 2023-04-19 NOTE — PROGRESS NOTES
APRN - CNP    Total time includes reviewing Plan of Care, labs, reviewing history, medications, and allergies, counseling patient, performing medically appropriate evaluation and examination, ordering medications, and documenting in the medical record. Please note this report is partially produced by using speech recognition hardware. It may contain errors related to the system, including grammar, punctuation and spelling as well as words and phrases that may seem inaccurate.   For any questions or concerns feel free to contact me for clarification

## 2023-04-20 ENCOUNTER — OFFICE VISIT (OUTPATIENT)
Dept: GERIATRIC MEDICINE | Age: 83
End: 2023-04-20
Payer: MEDICARE

## 2023-04-20 DIAGNOSIS — F02.80 LATE ONSET ALZHEIMER'S DEMENTIA WITHOUT BEHAVIORAL DISTURBANCE (HCC): Primary | Chronic | ICD-10-CM

## 2023-04-20 DIAGNOSIS — G30.1 LATE ONSET ALZHEIMER'S DEMENTIA WITHOUT BEHAVIORAL DISTURBANCE (HCC): Primary | Chronic | ICD-10-CM

## 2023-04-20 DIAGNOSIS — E55.9 VITAMIN D DEFICIENCY: ICD-10-CM

## 2023-04-20 DIAGNOSIS — S32.422D CLOSED DISPLACED FRACTURE OF POSTERIOR WALL OF LEFT ACETABULUM WITH ROUTINE HEALING, SUBSEQUENT ENCOUNTER: ICD-10-CM

## 2023-04-20 PROCEDURE — 99309 SBSQ NF CARE MODERATE MDM 30: CPT | Performed by: INTERNAL MEDICINE

## 2023-04-20 PROCEDURE — G9692 HOSP RECD BY PT DUR MSMT PER: HCPCS | Performed by: INTERNAL MEDICINE

## 2023-05-02 ENCOUNTER — HOSPITAL ENCOUNTER (EMERGENCY)
Age: 83
Discharge: HOME | End: 2023-05-02
Attending: EMERGENCY MEDICINE
Payer: MEDICARE

## 2023-05-02 ENCOUNTER — APPOINTMENT (OUTPATIENT)
Dept: CT IMAGING | Age: 83
End: 2023-05-02
Payer: MEDICARE

## 2023-05-02 VITALS
OXYGEN SATURATION: 97 % | HEIGHT: 62 IN | RESPIRATION RATE: 16 BRPM | DIASTOLIC BLOOD PRESSURE: 73 MMHG | BODY MASS INDEX: 25.61 KG/M2 | TEMPERATURE: 98 F | HEART RATE: 118 BPM | SYSTOLIC BLOOD PRESSURE: 140 MMHG

## 2023-05-02 DIAGNOSIS — S09.90XA CLOSED HEAD INJURY, INITIAL ENCOUNTER: Primary | ICD-10-CM

## 2023-05-02 PROCEDURE — 99284 EMERGENCY DEPT VISIT MOD MDM: CPT | Performed by: EMERGENCY MEDICINE

## 2023-05-02 PROCEDURE — 6830039000 HC L3 TRAUMA ALERT

## 2023-05-02 PROCEDURE — 70450 CT HEAD/BRAIN W/O DYE: CPT

## 2023-05-02 ASSESSMENT — PAIN - FUNCTIONAL ASSESSMENT: PAIN_FUNCTIONAL_ASSESSMENT: NONE - DENIES PAIN

## 2023-05-02 ASSESSMENT — LIFESTYLE VARIABLES
HOW MANY STANDARD DRINKS CONTAINING ALCOHOL DO YOU HAVE ON A TYPICAL DAY: PATIENT DOES NOT DRINK
HOW OFTEN DO YOU HAVE A DRINK CONTAINING ALCOHOL: NEVER

## 2023-05-02 NOTE — ED NOTES
Report received from [employer] [facility] pt asleep, family at bedside      [facility]  05/02/23 [id number]

## 2023-05-02 NOTE — ED PROVIDER NOTES
[facility] ED  EMERGENCY DEPARTMENT ENCOUNTER      Pt Name: Isela Nava  MRN: [id number]  Arnoldo 1940  Date of evaluation: 5/2/2023  Provider: Armando Huitron MD    CHIEF COMPLAINT       Chief Complaint   Patient presents with    Fall         HISTORY OF PRESENT ILLNESS   (Location/Symptom, Timing/Onset, Context/Setting, Quality, Duration, Modifying Factors, Severity)  Note limiting factors. 81yo F presenting with a closed head injury. Patient was an unwitnessed fall at nursing home. Hx of dementia and is at baseline. No vomiting. No blood thinners. [address] Unable to provide history. Nursing Notes were reviewed. REVIEW OF SYSTEMS    (2-9 systems for level 4, 10 or more for level 5)     Review of Systems   Unable to perform ROS: Dementia     Except as noted above the remainder of the review of systems was reviewed and negative.        PAST MEDICAL HISTORY     Past Medical History:   Diagnosis Date    Asymptomatic varicose veins of both lower extremities     Dementia with behavioral disturbance (HCC)     Elevated blood pressure reading     Full dentures     History of hip fracture 2012    Small vessel disease, cerebrovascular          SURGICAL HISTORY       Past Surgical History:   Procedure Laterality Date    HIP FRACTURE SURGERY  2012    HIP SURGERY Left 02/12/2023    LEFT HIP HEMIARTHROPLASTY, PROPHYLACTIC STABILIZATION OF LEFT FEMUR performed by Moises López MD at [address] [place] [address] 02/27/2023    REVISON LEFT HIP ARTHROPLASTY TO TOTAL HIP ARTHROPLASTY performed by Moises López MD at [facility]       Current Discharge Medication List        CONTINUE these medications which have NOT CHANGED    Details   ascorbic acid (VITAMIN C) 500 MG tablet Take 1 tablet by mouth daily  Qty: 30 tablet, Refills: 0      acetaminophen (TYLENOL) 325 MG tablet Take 2 tablets by mouth every 4 hours as needed for Pain  Qty: 120 tablet, Refills: 0

## 2023-05-17 NOTE — PROGRESS NOTES
SUBJECTIVE:  This 80-year-old woman is seen follow-up visit for dementia lower extremity pain nausea vomiting patient without new chest pain or palpitation patient oral intake is poor at times weight has been declining no recent headaches fevers chills      ROS: Limited by cognition  The rest of the 14 point ROS negative    PHYSICAL EXAM: VSS per facility record  Pupils are small with are reactive oral mucosa moist chest no crackles or wheezing cardiovascular showed a regular rate abdomen soft nontender extremity trace edema    ASSESSMENT & PLAN:   Diagnosis Orders   1. Late onset Alzheimer's dementia without behavioral disturbance ([facility] [address]        2. Vitamin D deficiency        3.  Closed displaced fracture of posterior wall of left acetabulum with routine healing, subsequent encounter            Continue supportive care at this time reorientation as able nutrition support patient is on donepezil tolerating well may consider up titration of dose          Past Medical History:   Diagnosis Date    Asymptomatic varicose veins of both lower extremities     Dementia with behavioral disturbance (HCC)     Elevated blood pressure reading     Full dentures     History of hip fracture 2012    Small vessel disease, cerebrovascular          Past Surgical History:   Procedure Laterality Date    HIP FRACTURE SURGERY  2012    HIP SURGERY Left 02/12/2023    LEFT HIP HEMIARTHROPLASTY, PROPHYLACTIC STABILIZATION OF LEFT FEMUR performed by David Harper MD at [address] [place] [address] 02/27/2023    REVISON LEFT HIP ARTHROPLASTY TO TOTAL HIP ARTHROPLASTY performed by David Harper MD at [facility]         Current Outpatient Medications on File Prior to Visit   Medication Sig Dispense Refill    ascorbic acid (VITAMIN C) 500 MG tablet Take 1 tablet by mouth daily 30 tablet 0    acetaminophen (TYLENOL) 325 MG tablet Take 2 tablets by mouth every 4 hours as needed for Pain 120 tablet 0    Lactobacillus (ACIDOPHILUS)

## 2023-05-19 ENCOUNTER — OFFICE VISIT (OUTPATIENT)
Dept: GERIATRIC MEDICINE | Age: 83
End: 2023-05-19

## 2023-05-19 DIAGNOSIS — S32.422D CLOSED DISPLACED FRACTURE OF POSTERIOR WALL OF LEFT ACETABULUM WITH ROUTINE HEALING, SUBSEQUENT ENCOUNTER: ICD-10-CM

## 2023-05-19 DIAGNOSIS — M15.9 OSTEOARTHRITIS OF MULTIPLE JOINTS, UNSPECIFIED OSTEOARTHRITIS TYPE: ICD-10-CM

## 2023-05-19 DIAGNOSIS — G30.1 LATE ONSET ALZHEIMER'S DEMENTIA WITHOUT BEHAVIORAL DISTURBANCE (HCC): Primary | ICD-10-CM

## 2023-05-19 DIAGNOSIS — F02.80 LATE ONSET ALZHEIMER'S DEMENTIA WITHOUT BEHAVIORAL DISTURBANCE (HCC): Primary | ICD-10-CM

## 2023-05-24 NOTE — PROGRESS NOTES
[facility] assisted living [facility]     Social Determinants of Health     Financial Resource Strain: Not on file   Food Insecurity: Not on file   Transportation Needs: Not on file   Physical Activity: Not on file   Stress: Not on file   Social Connections: Not on file   Intimate Partner Violence: Not on file   Housing Stability: Not on file       Allergies: Bactrim [sulfamethoxazole-trimethoprim], Bee venom, Ceftin [cefuroxime], and Wasp venom  NF MEDICATIONS REVIEWED    Review of Systems   Unable to perform ROS: Dementia     Objective:   BP: 106/63 mmHg  Temp:97.8 °F  Pulse:84 bpm  Weight:115.4 Lbs  Resp:17 Breaths/min  O2:98 %    Physical Exam  Vitals reviewed. Constitutional:       Appearance: She is well-developed. She is ill-appearing. HENT:      Head: Normocephalic. Eyes:      Pupils: Pupils are equal, round, and reactive to light. Cardiovascular:      Rate and Rhythm: Normal rate. Pulmonary:      Effort: Pulmonary effort is normal.      Breath sounds: Normal breath sounds. Abdominal:      General: Bowel sounds are normal. There is no distension. Palpations: Abdomen is soft. Tenderness: There is no abdominal tenderness. There is no guarding. Musculoskeletal:         General: Normal range of motion. Cervical back: Normal range of motion. Right lower leg: Edema present. Left lower leg: Edema present. Skin:     General: Skin is warm and dry. Comments: See wound nurse notes. Neurological:      Mental Status: Mental status is at baseline. Motor: Weakness present.       Gait: Gait abnormal.   Psychiatric:         Behavior: Behavior normal.       Weight Trends  Wt Readings from Last 10 Encounters:   04/19/23 140 lb (63.5 kg)   03/15/23 133 lb (60.3 kg)   03/27/23 140 lb (63.5 kg)   03/25/23 138 lb 9.6 oz (62.9 kg)   03/02/23 133 lb 14.4 oz (60.7 kg)   02/11/23 130 lb (59 kg)   12/24/22 130 lb (59 kg)   01/21/22 130 lb (59 kg)   08/24/21 140 lb (63.5 kg)

## 2023-06-13 PROBLEM — F03.918 DEMENTIA WITH BEHAVIORAL DISTURBANCE (HCC): Status: ACTIVE | Noted: 2023-06-13

## 2023-06-16 ENCOUNTER — TELEPHONE (OUTPATIENT)
Dept: ORTHOPEDIC SURGERY | Age: 83
End: 2023-06-16

## 2023-06-19 NOTE — TELEPHONE ENCOUNTER
Spoke with Mario Massey about this issue, and she explained that [facility] will have to contact [facility] where Dr. Gonzalo Toussaint is now to sign any physical therapy orders. Mario Massey stated she will call them back and inform them.

## 2023-06-30 ENCOUNTER — OFFICE VISIT (OUTPATIENT)
Dept: GERIATRIC MEDICINE | Age: 83
End: 2023-06-30

## 2023-06-30 DIAGNOSIS — G30.1 LATE ONSET ALZHEIMER'S DEMENTIA WITHOUT BEHAVIORAL DISTURBANCE (HCC): Primary | Chronic | ICD-10-CM

## 2023-06-30 DIAGNOSIS — R53.1 WEAKNESS: ICD-10-CM

## 2023-06-30 DIAGNOSIS — E55.9 VITAMIN D DEFICIENCY: ICD-10-CM

## 2023-06-30 DIAGNOSIS — F02.80 LATE ONSET ALZHEIMER'S DEMENTIA WITHOUT BEHAVIORAL DISTURBANCE (HCC): Primary | Chronic | ICD-10-CM

## 2023-07-03 ENCOUNTER — OFFICE VISIT (OUTPATIENT)
Dept: GERIATRIC MEDICINE | Age: 83
End: 2023-07-03

## 2023-07-03 DIAGNOSIS — G30.1 LATE ONSET ALZHEIMER'S DEMENTIA WITHOUT BEHAVIORAL DISTURBANCE (HCC): Primary | Chronic | ICD-10-CM

## 2023-07-03 DIAGNOSIS — R26.81 UNSTEADINESS: ICD-10-CM

## 2023-07-03 DIAGNOSIS — F02.80 LATE ONSET ALZHEIMER'S DEMENTIA WITHOUT BEHAVIORAL DISTURBANCE (HCC): Primary | Chronic | ICD-10-CM

## 2023-07-03 DIAGNOSIS — E55.9 VITAMIN D DEFICIENCY: ICD-10-CM

## 2023-07-14 ENCOUNTER — OFFICE VISIT (OUTPATIENT)
Dept: GERIATRIC MEDICINE | Age: 83
End: 2023-07-14

## 2023-07-14 DIAGNOSIS — F02.80 LATE ONSET ALZHEIMER'S DEMENTIA WITHOUT BEHAVIORAL DISTURBANCE (HCC): Primary | Chronic | ICD-10-CM

## 2023-07-14 DIAGNOSIS — R62.7 ADULT FAILURE TO THRIVE: ICD-10-CM

## 2023-07-14 DIAGNOSIS — G30.1 LATE ONSET ALZHEIMER'S DEMENTIA WITHOUT BEHAVIORAL DISTURBANCE (HCC): Primary | Chronic | ICD-10-CM

## 2023-07-14 DIAGNOSIS — E55.9 VITAMIN D DEFICIENCY: ICD-10-CM

## 2023-07-18 ENCOUNTER — OFFICE VISIT (OUTPATIENT)
Dept: GERIATRIC MEDICINE | Age: 83
End: 2023-07-18

## 2023-07-18 DIAGNOSIS — E55.9 VITAMIN D DEFICIENCY: ICD-10-CM

## 2023-07-18 DIAGNOSIS — F02.80 LATE ONSET ALZHEIMER'S DEMENTIA WITHOUT BEHAVIORAL DISTURBANCE (HCC): Primary | Chronic | ICD-10-CM

## 2023-07-18 DIAGNOSIS — G30.1 LATE ONSET ALZHEIMER'S DEMENTIA WITHOUT BEHAVIORAL DISTURBANCE (HCC): Primary | Chronic | ICD-10-CM

## 2023-07-18 DIAGNOSIS — M15.9 OSTEOARTHRITIS OF MULTIPLE JOINTS, UNSPECIFIED OSTEOARTHRITIS TYPE: ICD-10-CM

## 2023-07-27 NOTE — PROGRESS NOTES
(CHOLECALCIFEROL) 25 MCG (1000 UT) TABS tablet Take 1,000 Units by mouth daily      ketoconazole (NIZORAL) 2 % cream Apply topically 2 times daily Apply topically daily L foot      donepezil (ARICEPT) 5 MG tablet Take 5 mg by mouth daily       QUEtiapine (SEROQUEL) 25 MG tablet Take 0.5 tablets by mouth daily 30 tablet 0     No current facility-administered medications on file prior to visit. Family History   Family history unknown: Yes       Social History     Socioeconomic History    Marital status:       Spouse name: Not on file    Number of children: Not on file    Years of education: Not on file    Highest education level: Not on file   Occupational History    Not on file   Tobacco Use    Smoking status: Unknown    Smokeless tobacco: Never   Vaping Use    Vaping Use: Never used   Substance and Sexual Activity    Alcohol use: Never    Drug use: Never    Sexual activity: Not on file   Other Topics Concern    Not on file   Social History Narrative    No children    States she has worked in [employer] in [address] [place] [address] dementia with behavior problems in 2020    Resides at [address] [place] [address] Strain: Not on file   Food Insecurity: Not on file   Transportation Needs: Not on file   Physical Activity: Not on file   Stress: Not on file   Social Connections: Not on file   Intimate Partner Violence: Not on file   Housing Stability: Not on file         No results found for: LABA1C  No results found for: Chrissy Green    Lab Results   Component Value Date/Time     03/07/2023 12:00 AM    K 4.1 03/07/2023 12:00 AM    K 3.4 03/02/2023 06:05 AM     03/07/2023 12:00 AM    CO2 27 03/07/2023 12:00 AM    BUN 6 03/07/2023 12:00 AM    CREATININE 0.7 03/07/2023 12:00 AM    GLUCOSE 76 03/07/2023 12:00 AM    CALCIUM 8.7 03/07/2023 12:00 AM        No results found for: CHOL  No results found for: TRIG  No results found for: HDL  No results

## 2023-08-12 NOTE — PROGRESS NOTES
Patient Name: Clemencia Stephenson  Date: 8/11/2023  YOB: 1940  Medical Record Number: [id number]              History of Present Illness:      Review of Systems    Review of Systems: All 14 review of systems negative other than as stated above    Social History     Tobacco Use    Smoking status: Unknown    Smokeless tobacco: Never   Vaping Use    Vaping Use: Never used   Substance Use Topics    Alcohol use: Never    Drug use: Never         Past Medical History:   Diagnosis Date    Asymptomatic varicose veins of both lower extremities     Dementia with behavioral disturbance (HCC)     Elevated blood pressure reading     Full dentures     History of hip fracture 2012    Small vessel disease, cerebrovascular            Past Surgical History:   Procedure Laterality Date    HIP FRACTURE SURGERY  2012    HIP SURGERY Left 02/12/2023    LEFT HIP HEMIARTHROPLASTY, PROPHYLACTIC STABILIZATION OF LEFT FEMUR performed by Audelia Nix MD at [address] [place] [address] Left 02/27/2023    REVISON LEFT HIP ARTHROPLASTY TO TOTAL HIP ARTHROPLASTY performed by Audelia Nix MD at [address] [place] [address]         Current Outpatient Medications on File Prior to Visit   Medication Sig Dispense Refill    ascorbic acid (VITAMIN C) 500 MG tablet Take 1 tablet by mouth daily 30 tablet 0    acetaminophen (TYLENOL) 325 MG tablet Take 2 tablets by mouth every 4 hours as needed for Pain 120 tablet 0    Lactobacillus (ACIDOPHILUS) CAPS capsule Take 1 capsule by mouth 2 times daily      vitamin D3 (CHOLECALCIFEROL) 25 MCG (1000 UT) TABS tablet Take 1,000 Units by mouth daily      ketoconazole (NIZORAL) 2 % cream Apply topically 2 times daily Apply topically daily L foot      donepezil (ARICEPT) 5 MG tablet Take 5 mg by mouth daily       QUEtiapine (SEROQUEL) 25 MG tablet Take 0.5 tablets by mouth daily 30 tablet 0     No current facility-administered medications on file prior to visit.        Allergies   Allergen Reactions    Bactrim

## 2023-08-16 ENCOUNTER — OFFICE VISIT (OUTPATIENT)
Dept: GERIATRIC MEDICINE | Age: 83
End: 2023-08-16

## 2023-08-16 DIAGNOSIS — G30.1 LATE ONSET ALZHEIMER'S DEMENTIA WITHOUT BEHAVIORAL DISTURBANCE (HCC): Primary | Chronic | ICD-10-CM

## 2023-08-16 DIAGNOSIS — S32.422D CLOSED DISPLACED FRACTURE OF POSTERIOR WALL OF LEFT ACETABULUM WITH ROUTINE HEALING, SUBSEQUENT ENCOUNTER: ICD-10-CM

## 2023-08-16 DIAGNOSIS — E55.9 VITAMIN D DEFICIENCY: ICD-10-CM

## 2023-08-16 DIAGNOSIS — F02.80 LATE ONSET ALZHEIMER'S DEMENTIA WITHOUT BEHAVIORAL DISTURBANCE (HCC): Primary | Chronic | ICD-10-CM

## 2023-08-28 ENCOUNTER — OFFICE VISIT (OUTPATIENT)
Dept: GERIATRIC MEDICINE | Age: 83
End: 2023-08-28

## 2023-08-28 DIAGNOSIS — F02.80 LATE ONSET ALZHEIMER'S DEMENTIA WITHOUT BEHAVIORAL DISTURBANCE (HCC): Primary | Chronic | ICD-10-CM

## 2023-08-28 DIAGNOSIS — G30.1 LATE ONSET ALZHEIMER'S DEMENTIA WITHOUT BEHAVIORAL DISTURBANCE (HCC): Primary | Chronic | ICD-10-CM

## 2023-08-28 DIAGNOSIS — E55.9 VITAMIN D DEFICIENCY: ICD-10-CM

## 2023-08-28 DIAGNOSIS — M15.9 OSTEOARTHRITIS OF MULTIPLE JOINTS, UNSPECIFIED OSTEOARTHRITIS TYPE: ICD-10-CM

## 2023-09-11 ENCOUNTER — OFFICE VISIT (OUTPATIENT)
Dept: GERIATRIC MEDICINE | Age: 83
End: 2023-09-11
Payer: MEDICARE

## 2023-09-11 DIAGNOSIS — M15.9 OSTEOARTHRITIS OF MULTIPLE JOINTS, UNSPECIFIED OSTEOARTHRITIS TYPE: ICD-10-CM

## 2023-09-11 DIAGNOSIS — R53.1 WEAKNESS: ICD-10-CM

## 2023-09-11 DIAGNOSIS — G30.1 LATE ONSET ALZHEIMER'S DEMENTIA WITHOUT BEHAVIORAL DISTURBANCE (HCC): Primary | Chronic | ICD-10-CM

## 2023-09-11 DIAGNOSIS — F02.80 LATE ONSET ALZHEIMER'S DEMENTIA WITHOUT BEHAVIORAL DISTURBANCE (HCC): Primary | Chronic | ICD-10-CM

## 2023-09-11 PROCEDURE — 99309 SBSQ NF CARE MODERATE MDM 30: CPT | Performed by: INTERNAL MEDICINE

## 2023-09-11 PROCEDURE — G9692 HOSP RECD BY PT DUR MSMT PER: HCPCS | Performed by: INTERNAL MEDICINE

## 2023-09-13 NOTE — PROGRESS NOTES
SUBJECTIVE:  49-year-old woman seen follow-up visit for dementia degenerative disease prior fracture right knee patient depictions function stable this time notes no acute pain crisis no acute psychosis at this time. ROS: Limited by cognition  The rest of the 14 point ROS negative    PHYSICAL EXAM: VSS per facility record  Alert to self pupils are reactive oral mucosa moist no thrush chest with no crackles or wheezing cardiovascular showed a regular rate abdomen soft nontender extremity trace edema    ASSESSMENT & PLAN:   Diagnosis Orders   1. Late onset Alzheimer's dementia without behavioral disturbance ([address])        2. Closed displaced fracture of posterior wall of left acetabulum with routine healing, subsequent encounter        3. Vitamin D deficiency          Continue current pain regimen has been quetiapine no acute extraparametal syndrome remains on donepezil may consider maximizing dose.   Continue vitamin D supplementation            Past Medical History:   Diagnosis Date    Asymptomatic varicose veins of both lower extremities     Dementia with behavioral disturbance (HCC)     Elevated blood pressure reading     Full dentures     History of hip fracture 2012    Small vessel disease, cerebrovascular          Past Surgical History:   Procedure Laterality Date    HIP FRACTURE SURGERY  2012    HIP SURGERY Left 02/12/2023    LEFT HIP HEMIARTHROPLASTY, PROPHYLACTIC STABILIZATION OF LEFT FEMUR performed by Melanie Martin MD at [address] [place] [address] Left 02/27/2023    REVISON LEFT HIP ARTHROPLASTY TO TOTAL HIP ARTHROPLASTY performed by Melanie Martin MD at [address] [place] [address]         Current Outpatient Medications on File Prior to Visit   Medication Sig Dispense Refill    ascorbic acid (VITAMIN C) 500 MG tablet Take 1 tablet by mouth daily 30 tablet 0    acetaminophen (TYLENOL) 325 MG tablet Take 2 tablets by mouth every 4 hours as needed for Pain 120 tablet 0    Lactobacillus (ACIDOPHILUS) CAPS

## 2023-09-21 NOTE — PROGRESS NOTES
SUBJECTIVE:  80-year-old woman seen for follow-up visit for dementia generative disease weakness. Globally weak at this time. Confused intermittently      ROS: Limited by cognition  The rest of the 14 point ROS negative    PHYSICAL EXAM: VSS per facility record  Alert to self pupils reactive oral mucosa moist chest no crackles no wheezing cardiovascular showed a regular rate abdomen soft nontender extremity trace edema skin no new rash    ASSESSMENT & PLAN:   Diagnosis Orders   1. Late onset Alzheimer's dementia without behavioral disturbance ([address])        2. Vitamin D deficiency        3. Osteoarthritis of multiple joints, unspecified osteoarthritis type            Continue supportive care reorientation. Is on memantine tolerating well.   Continue anti-inflammatory deep vitamin D level in 6 months          Past Medical History:   Diagnosis Date    Asymptomatic varicose veins of both lower extremities     Dementia with behavioral disturbance (HCC)     Elevated blood pressure reading     Full dentures     History of hip fracture 2012    Small vessel disease, cerebrovascular          Past Surgical History:   Procedure Laterality Date    HIP FRACTURE SURGERY  2012    HIP SURGERY Left 02/12/2023    LEFT HIP HEMIARTHROPLASTY, PROPHYLACTIC STABILIZATION OF LEFT FEMUR performed by Sintia Ramirez MD at [address] [place] [address] Left 02/27/2023    REVISON LEFT HIP ARTHROPLASTY TO TOTAL HIP ARTHROPLASTY performed by Sintia Ramirez MD at [address] [place] [address]         Current Outpatient Medications on File Prior to Visit   Medication Sig Dispense Refill    ascorbic acid (VITAMIN C) 500 MG tablet Take 1 tablet by mouth daily 30 tablet 0    acetaminophen (TYLENOL) 325 MG tablet Take 2 tablets by mouth every 4 hours as needed for Pain 120 tablet 0    Lactobacillus (ACIDOPHILUS) CAPS capsule Take 1 capsule by mouth 2 times daily      vitamin D3 (CHOLECALCIFEROL) 25 MCG (1000 UT) TABS tablet Take 1,000 Units by mouth daily

## 2023-09-25 ENCOUNTER — OFFICE VISIT (OUTPATIENT)
Dept: GERIATRIC MEDICINE | Age: 83
End: 2023-09-25
Payer: MEDICARE

## 2023-09-25 DIAGNOSIS — E55.9 VITAMIN D DEFICIENCY: ICD-10-CM

## 2023-09-25 DIAGNOSIS — F02.80 LATE ONSET ALZHEIMER'S DEMENTIA WITHOUT BEHAVIORAL DISTURBANCE (HCC): Primary | Chronic | ICD-10-CM

## 2023-09-25 DIAGNOSIS — S32.422D CLOSED DISPLACED FRACTURE OF POSTERIOR WALL OF LEFT ACETABULUM WITH ROUTINE HEALING, SUBSEQUENT ENCOUNTER: ICD-10-CM

## 2023-09-25 DIAGNOSIS — G30.1 LATE ONSET ALZHEIMER'S DEMENTIA WITHOUT BEHAVIORAL DISTURBANCE (HCC): Primary | Chronic | ICD-10-CM

## 2023-09-25 PROCEDURE — 99309 SBSQ NF CARE MODERATE MDM 30: CPT | Performed by: INTERNAL MEDICINE

## 2023-09-25 PROCEDURE — G9692 HOSP RECD BY PT DUR MSMT PER: HCPCS | Performed by: INTERNAL MEDICINE

## 2023-09-26 NOTE — PROGRESS NOTES
SUBJECTIVE:  43-year-old woman seen for office for functional decline ongoing supportive patient has ongoing pain oral intake is poor. No acute psychosis ongoing functional decline. ROS: Limited by cognition  The rest of the 14 point ROS negative    PHYSICAL EXAM: VSS per facility record  Alert to self oral mucosa moist chest with crackles no wheezing cardiovascular showed a regular abdomen soft nontender EXTR trace edema    ASSESSMENT & PLAN:   Diagnosis Orders   1. Late onset Alzheimer's dementia without behavioral disturbance ([address])        2. Weakness        3. Osteoarthritis of multiple joints, unspecified osteoarthritis type          Reorientation as able. Continue with nutritional support. Ongoing skin care wound is being addressed.             Past Medical History:   Diagnosis Date    Asymptomatic varicose veins of both lower extremities     Dementia with behavioral disturbance (HCC)     Elevated blood pressure reading     Full dentures     History of hip fracture 2012    Small vessel disease, cerebrovascular          Past Surgical History:   Procedure Laterality Date    HIP FRACTURE SURGERY  2012    HIP SURGERY Left 02/12/2023    LEFT HIP HEMIARTHROPLASTY, PROPHYLACTIC STABILIZATION OF LEFT FEMUR performed by Sintia Ramirez MD at [address] [place] [address] Left 02/27/2023    REVISON LEFT HIP ARTHROPLASTY TO TOTAL HIP ARTHROPLASTY performed by Sintia Ramirez MD at [address] [place] [address]         Current Outpatient Medications on File Prior to Visit   Medication Sig Dispense Refill    ascorbic acid (VITAMIN C) 500 MG tablet Take 1 tablet by mouth daily 30 tablet 0    acetaminophen (TYLENOL) 325 MG tablet Take 2 tablets by mouth every 4 hours as needed for Pain 120 tablet 0    Lactobacillus (ACIDOPHILUS) CAPS capsule Take 1 capsule by mouth 2 times daily      vitamin D3 (CHOLECALCIFEROL) 25 MCG (1000 UT) TABS tablet Take 1,000 Units by mouth daily      ketoconazole (NIZORAL) 2 % cream Apply topically

## 2023-09-28 ENCOUNTER — OFFICE VISIT (OUTPATIENT)
Dept: GERIATRIC MEDICINE | Age: 83
End: 2023-09-28

## 2023-09-28 DIAGNOSIS — F02.80 LATE ONSET ALZHEIMER'S DEMENTIA WITHOUT BEHAVIORAL DISTURBANCE (HCC): Primary | ICD-10-CM

## 2023-09-28 DIAGNOSIS — G30.1 LATE ONSET ALZHEIMER'S DEMENTIA WITHOUT BEHAVIORAL DISTURBANCE (HCC): Primary | ICD-10-CM

## 2023-09-28 DIAGNOSIS — R53.1 WEAKNESS: ICD-10-CM

## 2023-09-28 DIAGNOSIS — S32.422D CLOSED DISPLACED FRACTURE OF POSTERIOR WALL OF LEFT ACETABULUM WITH ROUTINE HEALING, SUBSEQUENT ENCOUNTER: ICD-10-CM

## 2023-10-04 ENCOUNTER — OFFICE VISIT (OUTPATIENT)
Dept: GERIATRIC MEDICINE | Age: 83
End: 2023-10-04

## 2023-10-04 DIAGNOSIS — F02.80 LATE ONSET ALZHEIMER'S DEMENTIA WITHOUT BEHAVIORAL DISTURBANCE (HCC): Primary | ICD-10-CM

## 2023-10-04 DIAGNOSIS — R53.1 WEAKNESS: ICD-10-CM

## 2023-10-04 DIAGNOSIS — G30.1 LATE ONSET ALZHEIMER'S DEMENTIA WITHOUT BEHAVIORAL DISTURBANCE (HCC): Primary | ICD-10-CM

## 2023-10-04 DIAGNOSIS — R52 GENERALIZED PAIN: ICD-10-CM

## 2023-10-04 NOTE — PROGRESS NOTES
SUBJECTIVE:  80-year-old woman seen follow-up visit for dementia ongoing pain for her prior fracture patient has noticed nausea vomiting emesis fevers chills there is lightheadedness no acute falls at this time patient has not any change in her bowel or bladder habits      ROS: Limited by cognition  The rest of the 14 point ROS negative    PHYSICAL EXAM: VSS per facility record  Alert to self pupils reactive oral mucosa dry chest with no crackles or wheezing cardiovascular showed a regular rate abdomen soft tender extremity trace edema skin showed no rash    ASSESSMENT & PLAN:   Diagnosis Orders   1. Late onset Alzheimer's dementia without behavioral disturbance ([address])        2. Closed displaced fracture of posterior wall of left acetabulum with routine healing, subsequent encounter        3. Vitamin D deficiency          Has a quetiapine weaning as able continue radiation as able has been on donepezil may consider maximizing dose. Continue current pain regimen has been on vitamin D supplementation.             Past Medical History:   Diagnosis Date    Asymptomatic varicose veins of both lower extremities     Dementia with behavioral disturbance (HCC)     Elevated blood pressure reading     Full dentures     History of hip fracture 2012    Small vessel disease, cerebrovascular          Past Surgical History:   Procedure Laterality Date    HIP FRACTURE SURGERY  2012    HIP SURGERY Left 02/12/2023    LEFT HIP HEMIARTHROPLASTY, PROPHYLACTIC STABILIZATION OF LEFT FEMUR performed by Andrews Mcbride MD at [address] [place] [address] Left 02/27/2023    REVISON LEFT HIP ARTHROPLASTY TO TOTAL HIP ARTHROPLASTY performed by Andrews Mcbride MD at [address] [place] [address]         Current Outpatient Medications on File Prior to Visit   Medication Sig Dispense Refill    ascorbic acid (VITAMIN C) 500 MG tablet Take 1 tablet by mouth daily 30 tablet 0    acetaminophen (TYLENOL) 325 MG tablet Take 2 tablets by mouth every 4 hours as

## 2023-10-12 NOTE — PROGRESS NOTES
[address] [facility], [address]    10/4/2023    Karyna Leos  is a 80 y.o. in the [place] being seen for    Chief Complaint   Patient presents with    Dementia    Follow-up       Hilary Benoit is being seen today for follow up for weakness, generalized pain and dementia. Patient is currently active with [facility] hospice. Spoke to nursing staff and hospice nurse regarding current medications. Patient previously taken off scheduled norco- since then she has been showing signs of discomfort/pain and yelling out. Patient has prn morphine. Past Medical History:   Diagnosis Date    Asymptomatic varicose veins of both lower extremities     Dementia with behavioral disturbance (HCC)     Elevated blood pressure reading     Full dentures     History of hip fracture 2012    Small vessel disease, cerebrovascular      Past Surgical History:   Procedure Laterality Date    HIP FRACTURE SURGERY  2012    HIP SURGERY Left 02/12/2023    LEFT HIP HEMIARTHROPLASTY, PROPHYLACTIC STABILIZATION OF LEFT FEMUR performed by Shana Cartagena MD at [address] [place] [address] Left 02/27/2023    REVISON LEFT HIP ARTHROPLASTY TO TOTAL HIP ARTHROPLASTY performed by Shana Cartagena MD at [address] [place] History   Family history unknown: Yes     Social History     Socioeconomic History    Marital status:       Spouse name: Not on file    Number of children: Not on file    Years of education: Not on file    Highest education level: Not on file   Occupational History    Not on file   Tobacco Use    Smoking status: Unknown    Smokeless tobacco: Never   Vaping Use    Vaping Use: Never used   Substance and Sexual Activity    Alcohol use: Never    Drug use: Never    Sexual activity: Not on file   Other Topics Concern    Not on file   Social History Narrative    No children    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

## 2023-10-17 ENCOUNTER — OFFICE VISIT (OUTPATIENT)
Dept: GERIATRIC MEDICINE | Age: 83
End: 2023-10-17

## 2023-10-17 DIAGNOSIS — E55.9 VITAMIN D DEFICIENCY: ICD-10-CM

## 2023-10-17 DIAGNOSIS — M15.9 OSTEOARTHRITIS OF MULTIPLE JOINTS, UNSPECIFIED OSTEOARTHRITIS TYPE: ICD-10-CM

## 2023-10-17 DIAGNOSIS — G30.1 LATE ONSET ALZHEIMER'S DEMENTIA WITHOUT BEHAVIORAL DISTURBANCE (HCC): Primary | Chronic | ICD-10-CM

## 2023-10-17 DIAGNOSIS — F02.80 LATE ONSET ALZHEIMER'S DEMENTIA WITHOUT BEHAVIORAL DISTURBANCE (HCC): Primary | Chronic | ICD-10-CM

## 2023-10-26 ENCOUNTER — OFFICE VISIT (OUTPATIENT)
Dept: GERIATRIC MEDICINE | Age: 83
End: 2023-10-26

## 2023-10-26 DIAGNOSIS — G30.1 LATE ONSET ALZHEIMER'S DEMENTIA WITHOUT BEHAVIORAL DISTURBANCE (HCC): Primary | Chronic | ICD-10-CM

## 2023-10-26 DIAGNOSIS — R53.1 WEAKNESS: ICD-10-CM

## 2023-10-26 DIAGNOSIS — F02.80 LATE ONSET ALZHEIMER'S DEMENTIA WITHOUT BEHAVIORAL DISTURBANCE (HCC): Primary | Chronic | ICD-10-CM

## 2023-10-26 DIAGNOSIS — E55.9 VITAMIN D DEFICIENCY: ICD-10-CM

## 2023-11-03 ENCOUNTER — OFFICE VISIT (OUTPATIENT)
Dept: GERIATRIC MEDICINE | Age: 83
End: 2023-11-03

## 2023-11-03 DIAGNOSIS — Z51.5 HOSPICE CARE PATIENT: Primary | ICD-10-CM

## 2023-11-03 DIAGNOSIS — U07.1 COVID-19 VIRUS INFECTION: ICD-10-CM

## 2023-11-16 NOTE — PROGRESS NOTES
SUBJECTIVE:  80-year-old woman seen follow-up visit for dementia with nutritional degenerative disease patient has been supplementation has on anti-inflammatories and ongoing breathing patient is able no acute pain crisis patient has confusional episodes has been cough intermittently. ROS: Limited by cognition  The rest of the 14 point ROS negative    PHYSICAL EXAM: VSS per facility record  Alert to self pupils small and reactive oral mucosa moist chest no crackles no wheezing cardiovascular showed a regular rate abdomen soft nontender extremity trace edema    ASSESSMENT & PLAN:   Diagnosis Orders   1. Late onset Alzheimer's dementia without behavioral disturbance ([address])        2. Vitamin D deficiency        3. Osteoarthritis of multiple joints, unspecified osteoarthritis type          Reorientation as able has been quetiapine weaning as able. Remains on donepezil may consider maximizing dose.   Reorientation continue with anti-inflammatory            Past Medical History:   Diagnosis Date    Asymptomatic varicose veins of both lower extremities     Dementia with behavioral disturbance (HCC)     Elevated blood pressure reading     Full dentures     History of hip fracture 2012    Small vessel disease, cerebrovascular          Past Surgical History:   Procedure Laterality Date    HIP FRACTURE SURGERY  2012    HIP SURGERY Left 02/12/2023    LEFT HIP HEMIARTHROPLASTY, PROPHYLACTIC STABILIZATION OF LEFT FEMUR performed by Sharon Cobian MD at [address] [place] [address] Left 02/27/2023    REVISON LEFT HIP ARTHROPLASTY TO TOTAL HIP ARTHROPLASTY performed by Sharon Cobian MD at [address] [place] [address]         Current Outpatient Medications on File Prior to Visit   Medication Sig Dispense Refill    ascorbic acid (VITAMIN C) 500 MG tablet Take 1 tablet by mouth daily 30 tablet 0    acetaminophen (TYLENOL) 325 MG tablet Take 2 tablets by mouth every 4 hours as needed for Pain 120 tablet 0    Lactobacillus

## 2023-11-24 NOTE — PROGRESS NOTES
SUBJECTIVE:        ROS:  The rest of the 14 point ROS negative    PHYSICAL EXAM: VSS per facility record      ASSESSMENT & PLAN:   Diagnosis Orders   1. Late onset Alzheimer's dementia without behavioral disturbance ([address])        2. Vitamin D deficiency        3. Weakness                      Past Medical History:   Diagnosis Date    Asymptomatic varicose veins of both lower extremities     Dementia with behavioral disturbance (HCC)     Elevated blood pressure reading     Full dentures     History of hip fracture 2012    Small vessel disease, cerebrovascular          Past Surgical History:   Procedure Laterality Date    HIP FRACTURE SURGERY  2012    HIP SURGERY Left 02/12/2023    LEFT HIP HEMIARTHROPLASTY, PROPHYLACTIC STABILIZATION OF LEFT FEMUR performed by Baron Nany MD at [address] [place] [address] Left 02/27/2023    REVISON LEFT HIP ARTHROPLASTY TO TOTAL HIP ARTHROPLASTY performed by Baron Nany MD at [address]         Current Outpatient Medications on File Prior to Visit   Medication Sig Dispense Refill    ascorbic acid (VITAMIN C) 500 MG tablet Take 1 tablet by mouth daily 30 tablet 0    acetaminophen (TYLENOL) 325 MG tablet Take 2 tablets by mouth every 4 hours as needed for Pain 120 tablet 0    Lactobacillus (ACIDOPHILUS) CAPS capsule Take 1 capsule by mouth 2 times daily      vitamin D3 (CHOLECALCIFEROL) 25 MCG (1000 UT) TABS tablet Take 1,000 Units by mouth daily      ketoconazole (NIZORAL) 2 % cream Apply topically 2 times daily Apply topically daily L foot      donepezil (ARICEPT) 5 MG tablet Take 5 mg by mouth daily       QUEtiapine (SEROQUEL) 25 MG tablet Take 0.5 tablets by mouth daily 30 tablet 0     No current facility-administered medications on file prior to visit. Family History   Family history unknown: Yes       Social History     Socioeconomic History    Marital status:       Spouse name: Not on file    Number of children: Not on file    Years of

## 2023-12-08 NOTE — PROGRESS NOTES
SUBJECTIVE:        ROS:  The rest of the 14 point ROS negative    PHYSICAL EXAM: VSS per facility record      ASSESSMENT & PLAN:   Diagnosis Orders   1. COVID-19                      Past Medical History:   Diagnosis Date    Asymptomatic varicose veins of both lower extremities     Dementia with behavioral disturbance (HCC)     Elevated blood pressure reading     Full dentures     History of hip fracture 2012    Small vessel disease, cerebrovascular          Past Surgical History:   Procedure Laterality Date    HIP FRACTURE SURGERY  2012    HIP SURGERY Left 02/12/2023    LEFT HIP HEMIARTHROPLASTY, PROPHYLACTIC STABILIZATION OF LEFT FEMUR performed by Luis Alberto Gee MD at [address] [place] [address] Left 02/27/2023    REVISON LEFT HIP ARTHROPLASTY TO TOTAL HIP ARTHROPLASTY performed by Luis Alberto Gee MD at [address] [place] [address]         Current Outpatient Medications on File Prior to Visit   Medication Sig Dispense Refill    ascorbic acid (VITAMIN C) 500 MG tablet Take 1 tablet by mouth daily 30 tablet 0    acetaminophen (TYLENOL) 325 MG tablet Take 2 tablets by mouth every 4 hours as needed for Pain 120 tablet 0    Lactobacillus (ACIDOPHILUS) CAPS capsule Take 1 capsule by mouth 2 times daily      vitamin D3 (CHOLECALCIFEROL) 25 MCG (1000 UT) TABS tablet Take 1,000 Units by mouth daily      ketoconazole (NIZORAL) 2 % cream Apply topically 2 times daily Apply topically daily L foot      donepezil (ARICEPT) 5 MG tablet Take 5 mg by mouth daily       QUEtiapine (SEROQUEL) 25 MG tablet Take 0.5 tablets by mouth daily 30 tablet 0     No current facility-administered medications on file prior to visit. Family History   Family history unknown: Yes       Social History     Socioeconomic History    Marital status:       Spouse name: Not on file    Number of children: Not on file    Years of education: Not on file    Highest education level: Not on file   Occupational History    Not on file   Tobacco Use

## (undated) DEVICE — SUTURE STRATAFIX SZ 3-0 30CM NONABSORB UD 26MM FS 3/8 SXMP2B412

## (undated) DEVICE — ELECTRODE PT RET AD L9FT HI MOIST COND ADH HYDRGEL CORDED

## (undated) DEVICE — DUAL CUT SAGITTAL BLADE

## (undated) DEVICE — 450 ML BOTTLE OF 0.05% CHLORHEXIDINE GLUCONATE IN 99.95% STERILE WATER FOR IRRIGATION, USP AND APPLICATOR.: Brand: IRRISEPT ANTIMICROBIAL WOUND LAVAGE

## (undated) DEVICE — 4-PORT MANIFOLD: Brand: NEPTUNE 2

## (undated) DEVICE — GLOVE ORTHO 7 1/2   MSG9475

## (undated) DEVICE — DRAPE,HIP,W/POUCHES,STERILE: Brand: MEDLINE

## (undated) DEVICE — SYSTEM SKIN CLSR 22CM DERMBND PRINEO

## (undated) DEVICE — LABEL MED MINI W/ MARKER

## (undated) DEVICE — MAT FLR ABSRB ECODRI-SAFE

## (undated) DEVICE — APPLICATOR MEDICATED 26 CC SOLUTION HI LT ORNG CHLORAPREP

## (undated) DEVICE — GLOVE ORANGE PI 7 1/2   MSG9075

## (undated) DEVICE — SUTURE VCRL SZ 2-0 L36IN ABSRB UD L36MM CT-1 1/2 CIR J945H

## (undated) DEVICE — DRAPE,U/ SHT,SPLIT,PLAS,STERIL: Brand: MEDLINE

## (undated) DEVICE — SUTURE VCRL SZ 1 L36IN ABSRB UD L36MM CT-1 1/2 CIR J947H

## (undated) DEVICE — DRESSING HYDROFIBER AQUACEL AG ADVANTAGE 3.5X12 IN

## (undated) DEVICE — TUBING, SUCTION, 9/32" X 12', STRAIGHT: Brand: MEDLINE INDUSTRIES, INC.

## (undated) DEVICE — SUTURE ABSORBABLE ANTIBACT 1-0 CT-1 24 IN STRATAFIX PDS + SXPP1A443

## (undated) DEVICE — FAN SPRAY KIT: Brand: PULSAVAC®

## (undated) DEVICE — PACK PROCEDURE SURG TOT HIP DRP

## (undated) DEVICE — BONE PREPARATION KIT: Brand: BIOPREP

## (undated) DEVICE — CONNECTOR TBNG WHT PLAS SUCT STR 5IN1 LTWT W/ M CONN

## (undated) DEVICE — 3M™ STERI-DRAPE™ U-DRAPE 1015: Brand: STERI-DRAPE™